# Patient Record
Sex: FEMALE | Race: WHITE | Employment: FULL TIME | URBAN - METROPOLITAN AREA
[De-identification: names, ages, dates, MRNs, and addresses within clinical notes are randomized per-mention and may not be internally consistent; named-entity substitution may affect disease eponyms.]

---

## 2021-03-30 ENCOUNTER — OFFICE VISIT (OUTPATIENT)
Dept: ORTHOPEDIC SURGERY | Age: 31
End: 2021-03-30
Payer: COMMERCIAL

## 2021-03-30 VITALS
TEMPERATURE: 98.3 F | BODY MASS INDEX: 26.68 KG/M2 | HEIGHT: 62 IN | WEIGHT: 145 LBS | SYSTOLIC BLOOD PRESSURE: 100 MMHG | DIASTOLIC BLOOD PRESSURE: 60 MMHG | HEART RATE: 59 BPM

## 2021-03-30 DIAGNOSIS — M25.561 ACUTE PAIN OF RIGHT KNEE: Primary | ICD-10-CM

## 2021-03-30 DIAGNOSIS — S86.911A KNEE STRAIN, RIGHT, INITIAL ENCOUNTER: ICD-10-CM

## 2021-03-30 PROCEDURE — L1812 KO ELASTIC W/JOINTS PRE OTS: HCPCS | Performed by: PHYSICIAN ASSISTANT

## 2021-03-30 PROCEDURE — 99203 OFFICE O/P NEW LOW 30 MIN: CPT | Performed by: PHYSICIAN ASSISTANT

## 2021-03-30 SDOH — ECONOMIC STABILITY: FOOD INSECURITY: WITHIN THE PAST 12 MONTHS, THE FOOD YOU BOUGHT JUST DIDN'T LAST AND YOU DIDN'T HAVE MONEY TO GET MORE.: NOT ASKED

## 2021-03-30 SDOH — ECONOMIC STABILITY: TRANSPORTATION INSECURITY
IN THE PAST 12 MONTHS, HAS THE LACK OF TRANSPORTATION KEPT YOU FROM MEDICAL APPOINTMENTS OR FROM GETTING MEDICATIONS?: NOT ASKED

## 2021-03-30 NOTE — PROGRESS NOTES
Subjective:      Patient ID: Lorean Ganser is a 27 y.o. female. Chief Complaint   Patient presents with    Knee Injury     Rt. Injury on 3/29/21        HPI:   She is here for an initial evaluation of a new problem. Right knee injury. She states last evening while performing her work-out, she was jumping down off of stacked weights from the pull-up bar. She landed awkwardly and partially rolling her ankle and sustained an injury to her right knee. She states it looked as if her patella may have subluxed or dislocated. She is having moderate right knee swelling and tenderness over the medial and lateral joint line. She is training for a CrossFit competition. 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 Pain Scale 4/10 VAS. Location of pain medial and lateral right knee. Pain is worse with activities. Pain improves with ice. Previous treatments have included Advil. Review Of Systems:   A 14 point review of systems and history form completed by the patient has been reviewed. This scanned in the media tab of the patient's chart under today's date. As outlined in the HPI. Negative for fever or chills. Negative for poly-joint pain, swelling and stiffness. Negative for numbness or tingling. Past Medical History:   Diagnosis Date    Uncomplicated asthma        History reviewed. No pertinent family history. History reviewed. No pertinent surgical history. Social History     Occupational History    Occupation: Nurse Practitioner at [address] Use    Smoking status: Never Smoker    Smokeless tobacco: Never Used   Substance and Sexual Activity    Alcohol use: Not on file    Drug use: Not on file    Sexual activity: Not on file       No current outpatient medications on file. No current facility-administered medications for this visit.           Objective:     She is alert, oriented x 3, pleasant, well nourished, developed and in abrasions. No significant erythema. No significant rashes or skin lesions. X Rays: performed in the office today:   AP Standing,Lateral and Sunrise of right Knee: Normal radiographic study. The alignment is anatomic. There are no radiographic findings to suggest fracture or dislocation. Additional Tests reviewed: None. Additional Outside Records reviewed: None. Diagnosis:       ICD-10-CM    1. Acute pain of right knee  M25.561 XR KNEE RIGHT (3 VIEWS)     Breg Economy Hinged Knee Brace   2. Knee strain, right, initial encounter  S86.701X         Assessment and Plan:       Assessment:  Acute right knee pain after a valgus type injury to the knee. She thought she may have had a patellar subluxation event. She has a negative apprehension test.  She is not tender over the medial or lateral retinaculum or facet of the patella's. She has no anterior cruciate ligament laxity, posterior cruciate laxity or collateral ligament laxity. She has no significant joint effusion. Kristine's test mildly positive on exam.  Questionable meniscus tear. Probable mild strain of the knee. Plan:  Medications-NSAIDs recommended. Ibuprofen 600 mg 3 times daily. PT-PT for gentle range of motion. Quad strengthening exercises. Further Imaging-discussed MRI of the knee. She would like to hold off at this point to see if symptoms improve over the next week. If symptoms do not improve or symptoms worsen she should notify the office to schedule MRI of the right knee. Procedures-recommend knee brace. Procedures    Breg Economy Hinged Knee Brace     Patient was prescribed a Breg Economy Hinged Knee Brace. The right knee will require stabilization / immobilization from this semi-rigid / rigid orthosis to improve their function. The orthosis will assist in protecting the affected area, provide functional support and facilitate healing.     The patient was educated and fit by a healthcare professional with expert knowledge and specialization in brace application while under the direct supervision of the treating physician. Verbal and written instructions for the use of and application of this item were provided. They were instructed to contact the office immediately should the brace result in increased pain, decreased sensation, increased swelling or worsening of the condition. Follow up- 1 week. Call or return to clinic if these symptoms worsen or fail to improve as anticipated.

## 2021-04-16 ENCOUNTER — TELEPHONE (OUTPATIENT)
Dept: ORTHOPEDIC SURGERY | Age: 31
End: 2021-04-16

## 2021-04-16 DIAGNOSIS — M25.561 ACUTE PAIN OF RIGHT KNEE: Primary | ICD-10-CM

## 2021-04-16 NOTE — TELEPHONE ENCOUNTER
OTHER:  PATIENT RE-INJURED HER RIGHT KNEE. REQUESTING TO HAVE A MRI DONE. REQ A CALL BACK TO DISCUSS.   PATIENTReinaldo December  PHONE: [phone]

## 2021-04-16 NOTE — TELEPHONE ENCOUNTER
Called and spoke with patient. She states knee was feeling better after visit and she went back to the gym and knee gave out once again and leg went completely to the side. Informed this would be addressed with Sara Sotomayor on Monday to see if he would like patient to come back in for evaluation or is okay with her going forward with getting the MRI.

## 2021-04-19 NOTE — TELEPHONE ENCOUNTER
Called and advised patient that Tracy Baker would like to move forward with the mri. [facility].

## 2021-04-30 ENCOUNTER — OFFICE VISIT (OUTPATIENT)
Dept: ORTHOPEDIC SURGERY | Age: 31
End: 2021-04-30
Payer: COMMERCIAL

## 2021-04-30 ENCOUNTER — TELEPHONE (OUTPATIENT)
Dept: ORTHOPEDIC SURGERY | Age: 31
End: 2021-04-30

## 2021-04-30 VITALS — TEMPERATURE: 97.6 F | BODY MASS INDEX: 25.69 KG/M2 | WEIGHT: 145 LBS | HEIGHT: 63 IN

## 2021-04-30 DIAGNOSIS — S86.911A KNEE STRAIN, RIGHT, INITIAL ENCOUNTER: Primary | ICD-10-CM

## 2021-04-30 PROCEDURE — 99204 OFFICE O/P NEW MOD 45 MIN: CPT | Performed by: ORTHOPAEDIC SURGERY

## 2021-04-30 NOTE — TELEPHONE ENCOUNTER
Appointment Request     Patient requesting earlier appointment: No  Appointment offered to patient: [address]   Patient Contact Number: [phone]    PATIENT NEEDS TO RESCHEDULE SO THAT HER FAMILY CAN COME FROM OUT OF TOWN

## 2021-04-30 NOTE — PROGRESS NOTES
Date:  2021    Name:  Cary Leggett  Address:  [address] [place] [id number]    :  1990      Age:   27 y.o.    SSN:        Medical Record Number:  <[id number]>    Reason for Visit:    Chief Complaint    New Patient (Right knee)      DOS:2021     HPI: Cary Leggett is a 27 y.o. female here today for evaluation of right knee pain that has been ongoing for a month. A month ago she injured her right knee performing a pull up and when dropping down from the bar she landed awkwardly on a pile of stacked weights. She states it looked as if her patella may have subluxed or dislocated. She had moderate right knee swelling with pain medially and laterally. She has been wearing a brace for support due to the feeling of instability. She did try to return to weightlifting a couple weeks after the initial injury but was very unstable and felt the knee buckle. 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 Pain Assessment  Location of Pain: Knee  Location Modifiers: Right  Severity of Pain: 3  Quality of Pain: Sharp, Dull, Aching  Duration of Pain: Persistent  Frequency of Pain: Intermittent  Aggravating Factors: Bending(twisting)  Limiting Behavior: Yes  Relieving Factors: Rest  Result of Injury: Yes  Work-Related Injury: No  Are there other pain locations you wish to document?: No  ROS: Review of systems reviewed from Patient History Form completed today and available in the patient's chart under the Media tab. Past Medical History:   Diagnosis Date    Uncomplicated asthma         History reviewed. No pertinent surgical history. History reviewed. No pertinent family history.     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[employer] resource strain: None    Food insecurity     Worry: None     Inability: None    Transportation needs     Medical: None     Non-medical: None   Tobacco Use    Smoking status: Never Smoker    Smokeless tobacco: Never Used   Substance and Sexual Activity    Alcohol use: None    Drug use: None    Sexual activity: None   Lifestyle    Physical activity     Days per week: None     Minutes per session: None    Stress: None   Relationships    Social connections     Talks on phone: None     Gets together: None     Attends [employer] service: None     Active member of club or organization: None     Attends meetings of clubs or organizations: None     Relationship status: None    Intimate partner violence     Fear of current or ex partner: None     Emotionally abused: None     Physically abused: None     Forced sexual activity: None   Other Topics Concern    None   Social History Narrative    None       No current outpatient medications on file. No current facility-administered medications for this visit. Allergies   Allergen Reactions    Amoxicillin      Hives    Hydrocodone      Facial rash       Vital signs:  Temp 97.6 °F (36.4 °C)   Ht 5' 3\" (1.6 m)   Wt 145 lb (65.8 kg)   BMI 25.69 kg/m²            RIGHT Knee Exam:     Gait: No use of assistive devices. No antalgic gait. Alignment: Normal alignment. Inspection/skin: Skin is intact without erythema or ecchymosis. No gross deformity. Palpation: No crepitus. Medial and lateral joint line tenderness present. Range of Motion: 0 - 100 degrees of flexion. Strength: Moderate quad atrophy. Effusion: No effusion or swelling present. Ligamentous stability: No cruciate or collateral ligament instability. Neurologic and vascular: Skin is warm and well-perfused. Sensation is intact to light-touch.      Special tests: 2+ Lachman      LEFT Knee Exam: Gait: No use of assistive devices. No antalgic gait. Alignment: Normal alignment. Inspection/skin: Skin is intact without erythema or ecchymosis. No gross deformity. Palpation: No crepitus. No joint line tenderness present. Range of Motion: There is full range of motion. Strength: Normal quadriceps development. Effusion: No effusion or swelling present. Ligamentous stability: No cruciate or collateral ligament instability. Neurologic and vascular: Skin is warm and well-perfused. Sensation is intact to light-touch. Special tests: Negative Kristine sign. Diagnostics:  Radiology:     Pertinent imaging was interpreted and reviewed with the patient, both images and report. Previous radiographs dated 3/30/2021 AP Standing,Lateral and Sunrise of right Knee: Normal radiographic study. The alignment is anatomic. There are no radiographic findings to suggest fracture or dislocation. MRI of the right knee dated 4/22/2021 was interpreted and reviewed with the patient today. CONCLUSION:   1. Pivot-shift mechanism of injury, with complete, full-thickness tear of the midbody ACL, and    impaction fractures of the anterolateral femoral condyle, posteromedial and posterolateral    tibial plateau, and peripheral medial femoral condyle.  No passive anterior tibial translation. 2. Grade 1-2 MCL sprain. 3. Ramp type 1 lesion of the medial meniscus, with swelling/sprain of the posteromedial capsule    and meniscocapsular junction, and thin, 1-1.5 cm peripheral horizontal tear of the medial    meniscus at the posterior body-horn junction. 4. Thin 1-1.5 cm Wrisberg rip tear of the lateral meniscus posterior root-horn junction.     Adjacent swelling/sprain of the popliteofibular and arcuate ligaments. Assessment:   1. ACL tear right knee  2. Medial and lateral meniscus tears of right knee      Plan: Pertinent imaging was reviewed.  The etiology, natural history, and treatment options for the disorder were discussed. The roles of activity medication, antiinflammatories, injections, bracing, physical therapy, and surgical interventions were all described to the patient and questions were answered. She is a very active person with an unstable knee and I do not feel conservative treatment will be sufficient for her level of activity. I believe she is a candidate for surgical intervention for a right knee ACL reconstruction with medial and lateral menisectomies versus repair. We discussed graft options and she would like to proceed with BTB autograft. Risks, benefits and potential complications of right knee surgery were discussed with the patient. Risks discussed include but are not limited to bleeding, infection, anesthetic risk, injury to nerves and blood vessels, deep vein thrombosis, residual stiffness and weakness, and the need for revision surgery. The patient also understands that anesthetic risks include cardiopulmonary issues, drug reactions and even death. The patient voices an understanding of the importance of physical therapy and home exercises after surgery. All questions were answered. No personal history of blood clots. No Family history of blood clots. No personal history of bleeding disorders. Personal history antibiotic allergies, Amoxicillin. No personal intolerance or allergies to NSAIDs. Personal intolerance or allergies to narcotics, hydrocodone. No personal diabetes. Plans to do postoperative physical therapy at [facility]. I will see her back postoperatively, sooner if needed. Arabella Bean is in agreement with this plan. All questions were answered to patient's satisfaction and was encouraged to call with any further questions. No orders of the defined types were placed in this encounter.         Total time spent for evaluation, education and development of treatment plan: 45 minutes      Wally CONTRERAS ATC, am scribing for and in the presence of Dr. Jay Jay Cardoza. 04/30/21 10:17 AM Aubree Chapman ATC        I attest that I met personally with the patient, performed the described exam, reviewed the radiographic studies and medical records associated with this patient and supervised the services that are described above.      Monika Chappell MD

## 2021-05-04 ENCOUNTER — TELEPHONE (OUTPATIENT)
Dept: ORTHOPEDIC SURGERY | Age: 31
End: 2021-05-04

## 2021-05-06 ENCOUNTER — OFFICE VISIT (OUTPATIENT)
Dept: PRIMARY CARE CLINIC | Age: 31
End: 2021-05-06
Payer: COMMERCIAL

## 2021-05-06 DIAGNOSIS — Z01.818 PRE-OP EXAMINATION: Primary | ICD-10-CM

## 2021-05-06 LAB — SARS-COV-2: NOT DETECTED

## 2021-05-06 PROCEDURE — 99421 OL DIG E/M SVC 5-10 MIN: CPT | Performed by: NURSE PRACTITIONER

## 2021-05-06 NOTE — PROGRESS NOTES
[phone]. 4. Please call [phone] option #2 option #2 if you have not been preregistered yet. On the day of your procedure bring your insurance card and photo ID. You will be registered at your bedside once brought back to your room. 5. DO NOT EAT ANYTHING eight hours prior to your arrival for surgery. May have 8 ounces of water 4 hours prior to your arrival for surgery. NOTE: ALL Gastric, Bariatric and Bowel surgery patients MUST follow their surgeon's instructions. 6. MEDICATIONS    Take the following medications with a SMALL sip of water: none   Use your usual dose of inhalers the morning of surgery. BRING your rescue inhaler with you to hospital.    Anesthesia does NOT want you to take insulin the morning of surgery. They will control your blood sugar while you are at the hospital. Please contact your ordering physician for instructions regarding your insulin the night before your procedure. If you have an insulin pump, please keep it set on basal rate. 7. Do not swallow water when brushing teeth. No gum, candy, mints or ice chips. Refrain from smoking or at least decrease the amount. 8. Dress in loose, comfortable clothing appropriate for redressing after your procedure. Do not wear jewelry (including body piercings), make-up (especially NO eye make-up), fingernail polish (NO toenail polish if foot/leg surgery), lotion, powders or metal hairclips. 9. Dentures, glasses, or contacts will need to be removed before your procedure. Bring cases for your glasses, contacts, dentures, or hearing aids to protect them while you are in surgery. 10. If you use a CPAP, please bring it with you on the day of your procedure. 11. We recommend that valuable personal  belongings such as cash, cell phones, e-tablets or jewelry, be left at home during your stay. The hospital will not be responsible for valuables that are not secured in the hospital safe.  However, if your insurance requires the first phase of your recovery. Your nurse will help you recover from any potential side effects of anesthesia, such as extreme drowsiness, changes in your vital signs or breathing patterns. Nausea, headache, muscle aches, or sore throat may also occur after anesthesia. Your nurse will help you manage these potential side effects. 2. For comfort and safety, arrange to have someone at home with you for the first 24 hours after discharge. 3. You and your family will be given written instructions about your diet, activity, dressing care, medications, and return visits. 4. Once at home, should issues with nausea, pain, or bleeding occur, or should you notice any signs of infection, you should call your surgeon. 5. Always clean your hands before and after caring for your wound. Do not let your family touch your surgery site without cleaning their hands. 6. Narcotic pain medications can cause significant constipation. You may want to add a stool softener to your postoperative medication schedule or speak to your surgeon on how best to manage this SIDE EFFECT. SPECIAL INSTRUCTIONS  If menstruating DOS, no tampons patient acknowledges understanding of this along with  pre op instructions. Thank you for allowing us to care for you. We strive to exceed your expectations in the delivery of care and service provided to you and your family. If you need to contact the [place] [address] staff for any reason, please call us at [phone]    Instructions reviewed with patient during preadmission testing phone interview. Mary Sheth. 5/6/2021 .9:32 AM      ADDITIONAL EDUCATIONAL INFORMATION REVIEWED PER PHONE WITH YOU AND/OR YOUR FAMILY:  Yes Hibiclens® Bathing Instructions   No Antibacterial Soap

## 2021-05-06 NOTE — TELEPHONE ENCOUNTER
Auth: # [id number]    Date: 5/06/2021 thru 5/11/2021  Type of SX:  Outpatient  Location: [facility]  CPT: [id number], [id number]   SX area: Rt knee  Insurance: [employer]

## 2021-05-10 ENCOUNTER — ANESTHESIA EVENT (OUTPATIENT)
Dept: OPERATING ROOM | Age: 31
End: 2021-05-10
Payer: COMMERCIAL

## 2021-05-10 NOTE — PROGRESS NOTES
[facility] / [address] [facility], [address]    Acknowledgment of Informed Consent for Surgical or Medical Procedure and Sedation  I agree to allow doctor(s) Zachary Damon  and his/her associates or assistants, including residents and/or other qualified medical practitioner to perform the following medical treatment or procedure and to administer or direct the administration of sedation as necessary:  Procedure(s): RIGHT KNEE ARTHROSOCPY, ANTERIOR CRUCIATE LIGAMENT RECONSTRUCTION PATELLA TENDON, MEDIAL AND [address] [place]       My doctor has explained the following regarding the proposed procedure:   the explanation of the procedure   the benefits of the procedure   the potential problems that might occur during recuperation   the risks and side effects of the procedure which could include but are not limited to severe blood loss, infection, stroke or death   the benefits, risks and side effect of alternative procedures including the consequences of declining this procedure or any alternative procedures   the likelihood of achieving satisfactory results. I acknowledge no guarantee or assurance has been made to me regarding the results. I understand that during the course of this treatment/procedure, unforeseen conditions can occur which require an additional or different procedure. I agree to allow my physician or assistants to perform such extension of the original procedure as they may find necessary. I understand that sedation will often result in temporary impairment of memory and fine motor skills and that sedation can occasionally progress to a state of deep sedation or general anesthesia. I understand the risks of anesthesia for surgery include, but are not limited to, sore throat, hoarseness, injury to face, mouth, or teeth; nausea; headache; injury to blood vessels or nerves; death, brain damage, or paralysis.     I understand

## 2021-05-11 ENCOUNTER — ANESTHESIA (OUTPATIENT)
Dept: OPERATING ROOM | Age: 31
End: 2021-05-11
Payer: COMMERCIAL

## 2021-05-11 ENCOUNTER — HOSPITAL ENCOUNTER (OUTPATIENT)
Age: 31
Setting detail: OUTPATIENT SURGERY
Discharge: HOME | End: 2021-05-11
Attending: ORTHOPAEDIC SURGERY | Admitting: ORTHOPAEDIC SURGERY
Payer: COMMERCIAL

## 2021-05-11 VITALS
BODY MASS INDEX: 26.58 KG/M2 | HEART RATE: 62 BPM | OXYGEN SATURATION: 100 % | TEMPERATURE: 97.1 F | RESPIRATION RATE: 16 BRPM | HEIGHT: 63 IN | DIASTOLIC BLOOD PRESSURE: 68 MMHG | SYSTOLIC BLOOD PRESSURE: 121 MMHG | WEIGHT: 150 LBS

## 2021-05-11 VITALS — DIASTOLIC BLOOD PRESSURE: 55 MMHG | SYSTOLIC BLOOD PRESSURE: 98 MMHG | OXYGEN SATURATION: 100 % | TEMPERATURE: 97 F

## 2021-05-11 DIAGNOSIS — Z98.890 S/P ACL RECONSTRUCTION: Primary | ICD-10-CM

## 2021-05-11 LAB — PREGNANCY, URINE: NEGATIVE

## 2021-05-11 PROCEDURE — 84703 CHORIONIC GONADOTROPIN ASSAY: CPT

## 2021-05-11 PROCEDURE — 6360000002 HC RX W HCPCS: Performed by: ORTHOPAEDIC SURGERY

## 2021-05-11 PROCEDURE — 2580000003 HC RX 258: Performed by: NURSE ANESTHETIST, CERTIFIED REGISTERED

## 2021-05-11 PROCEDURE — 6370000000 HC RX 637 (ALT 250 FOR IP): Performed by: ANESTHESIOLOGY

## 2021-05-11 PROCEDURE — 2580000003 HC RX 258: Performed by: ORTHOPAEDIC SURGERY

## 2021-05-11 PROCEDURE — 64447 NJX AA&/STRD FEMORAL NRV IMG: CPT | Performed by: ANESTHESIOLOGY

## 2021-05-11 PROCEDURE — 2580000003 HC RX 258: Performed by: ANESTHESIOLOGY

## 2021-05-11 PROCEDURE — 7100000011 HC PHASE II RECOVERY - ADDTL 15 MIN: Performed by: ORTHOPAEDIC SURGERY

## 2021-05-11 PROCEDURE — 6360000002 HC RX W HCPCS: Performed by: ANESTHESIOLOGY

## 2021-05-11 PROCEDURE — 7100000001 HC PACU RECOVERY - ADDTL 15 MIN: Performed by: ORTHOPAEDIC SURGERY

## 2021-05-11 PROCEDURE — C1713 ANCHOR/SCREW BN/BN,TIS/BN: HCPCS | Performed by: ORTHOPAEDIC SURGERY

## 2021-05-11 PROCEDURE — 3600000004 HC SURGERY LEVEL 4 BASE: Performed by: ORTHOPAEDIC SURGERY

## 2021-05-11 PROCEDURE — 2500000003 HC RX 250 WO HCPCS: Performed by: NURSE ANESTHETIST, CERTIFIED REGISTERED

## 2021-05-11 PROCEDURE — 7100000000 HC PACU RECOVERY - FIRST 15 MIN: Performed by: ORTHOPAEDIC SURGERY

## 2021-05-11 PROCEDURE — 2500000003 HC RX 250 WO HCPCS: Performed by: ORTHOPAEDIC SURGERY

## 2021-05-11 PROCEDURE — 3700000000 HC ANESTHESIA ATTENDED CARE: Performed by: ORTHOPAEDIC SURGERY

## 2021-05-11 PROCEDURE — 2709999900 HC NON-CHARGEABLE SUPPLY: Performed by: ORTHOPAEDIC SURGERY

## 2021-05-11 PROCEDURE — 6360000002 HC RX W HCPCS: Performed by: NURSE ANESTHETIST, CERTIFIED REGISTERED

## 2021-05-11 PROCEDURE — 3600000014 HC SURGERY LEVEL 4 ADDTL 15MIN: Performed by: ORTHOPAEDIC SURGERY

## 2021-05-11 PROCEDURE — 2720000010 HC SURG SUPPLY STERILE: Performed by: ORTHOPAEDIC SURGERY

## 2021-05-11 PROCEDURE — 6360000002 HC RX W HCPCS

## 2021-05-11 PROCEDURE — 3700000001 HC ADD 15 MINUTES (ANESTHESIA): Performed by: ORTHOPAEDIC SURGERY

## 2021-05-11 PROCEDURE — 7100000010 HC PHASE II RECOVERY - FIRST 15 MIN: Performed by: ORTHOPAEDIC SURGERY

## 2021-05-11 DEVICE — UNIVERSAL WEDGE 8MM X 20MM (1.5MM BORE): Type: IMPLANTABLE DEVICE | Site: KNEE | Status: FUNCTIONAL

## 2021-05-11 DEVICE — SCREW INTRF CANN 7X20 MM WDG SHP ROUNDED THRD FOR ACL RECON: Type: IMPLANTABLE DEVICE | Site: KNEE | Status: FUNCTIONAL

## 2021-05-11 DEVICE — ANCHOR SUTURE CRV POLYESTER 2 FIBERWIRE FIBERSTITCH: Type: IMPLANTABLE DEVICE | Site: KNEE | Status: FUNCTIONAL

## 2021-05-11 RX ORDER — LIDOCAINE HCL/PF 100 MG/5ML
SYRINGE (ML) INJECTION PRN
Status: DISCONTINUED | OUTPATIENT
Start: 2021-05-11 | End: 2021-05-11 | Stop reason: SDUPTHER

## 2021-05-11 RX ORDER — SODIUM CHLORIDE 0.9 % (FLUSH) 0.9 %
10 SYRINGE (ML) INJECTION EVERY 12 HOURS SCHEDULED
Status: DISCONTINUED | OUTPATIENT
Start: 2021-05-11 | End: 2021-05-11 | Stop reason: HOSPADM

## 2021-05-11 RX ORDER — SODIUM CHLORIDE 9 MG/ML
25 INJECTION, SOLUTION INTRAVENOUS PRN
Status: DISCONTINUED | OUTPATIENT
Start: 2021-05-11 | End: 2021-05-11 | Stop reason: HOSPADM

## 2021-05-11 RX ORDER — HYDRALAZINE HYDROCHLORIDE 20 MG/ML
5 INJECTION INTRAMUSCULAR; INTRAVENOUS EVERY 10 MIN PRN
Status: DISCONTINUED | OUTPATIENT
Start: 2021-05-11 | End: 2021-05-11 | Stop reason: HOSPADM

## 2021-05-11 RX ORDER — ROCURONIUM BROMIDE 10 MG/ML
INJECTION, SOLUTION INTRAVENOUS PRN
Status: DISCONTINUED | OUTPATIENT
Start: 2021-05-11 | End: 2021-05-11 | Stop reason: SDUPTHER

## 2021-05-11 RX ORDER — ROPIVACAINE HYDROCHLORIDE 5 MG/ML
30 INJECTION, SOLUTION EPIDURAL; INFILTRATION; PERINEURAL ONCE
Status: DISCONTINUED | OUTPATIENT
Start: 2021-05-11 | End: 2021-05-11 | Stop reason: HOSPADM

## 2021-05-11 RX ORDER — PROCHLORPERAZINE EDISYLATE 5 MG/ML
5 INJECTION INTRAMUSCULAR; INTRAVENOUS
Status: COMPLETED | OUTPATIENT
Start: 2021-05-11 | End: 2021-05-11

## 2021-05-11 RX ORDER — PROPOFOL 10 MG/ML
INJECTION, EMULSION INTRAVENOUS PRN
Status: DISCONTINUED | OUTPATIENT
Start: 2021-05-11 | End: 2021-05-11 | Stop reason: SDUPTHER

## 2021-05-11 RX ORDER — SODIUM CHLORIDE, SODIUM LACTATE, POTASSIUM CHLORIDE, AND CALCIUM CHLORIDE .6; .31; .03; .02 G/100ML; G/100ML; G/100ML; G/100ML
IRRIGANT IRRIGATION PRN
Status: DISCONTINUED | OUTPATIENT
Start: 2021-05-11 | End: 2021-05-11 | Stop reason: ALTCHOICE

## 2021-05-11 RX ORDER — FENTANYL CITRATE 50 UG/ML
50 INJECTION, SOLUTION INTRAMUSCULAR; INTRAVENOUS EVERY 5 MIN PRN
Status: DISCONTINUED | OUTPATIENT
Start: 2021-05-11 | End: 2021-05-11 | Stop reason: HOSPADM

## 2021-05-11 RX ORDER — SODIUM CHLORIDE, SODIUM LACTATE, POTASSIUM CHLORIDE, CALCIUM CHLORIDE 600; 310; 30; 20 MG/100ML; MG/100ML; MG/100ML; MG/100ML
INJECTION, SOLUTION INTRAVENOUS CONTINUOUS PRN
Status: DISCONTINUED | OUTPATIENT
Start: 2021-05-11 | End: 2021-05-11 | Stop reason: SDUPTHER

## 2021-05-11 RX ORDER — ONDANSETRON 4 MG/1
4 TABLET, FILM COATED ORAL EVERY 8 HOURS PRN
Qty: 30 TABLET | Refills: 0 | Status: SHIPPED | OUTPATIENT
Start: 2021-05-11

## 2021-05-11 RX ORDER — CEFAZOLIN SODIUM 2 G/50ML
2000 SOLUTION INTRAVENOUS ONCE
Status: COMPLETED | OUTPATIENT
Start: 2021-05-11 | End: 2021-05-11

## 2021-05-11 RX ORDER — DEXAMETHASONE SODIUM PHOSPHATE 4 MG/ML
INJECTION, SOLUTION INTRA-ARTICULAR; INTRALESIONAL; INTRAMUSCULAR; INTRAVENOUS; SOFT TISSUE PRN
Status: DISCONTINUED | OUTPATIENT
Start: 2021-05-11 | End: 2021-05-11 | Stop reason: SDUPTHER

## 2021-05-11 RX ORDER — OXYCODONE HYDROCHLORIDE AND ACETAMINOPHEN 5; 325 MG/1; MG/1
1-2 TABLET ORAL EVERY 4 HOURS PRN
Qty: 42 TABLET | Refills: 0 | Status: SHIPPED | OUTPATIENT
Start: 2021-05-11 | End: 2021-05-18

## 2021-05-11 RX ORDER — AMOXICILLIN 250 MG
2 CAPSULE ORAL DAILY PRN
Qty: 30 TABLET | Refills: 0 | Status: SHIPPED | OUTPATIENT
Start: 2021-05-11

## 2021-05-11 RX ORDER — PROCHLORPERAZINE EDISYLATE 5 MG/ML
INJECTION INTRAMUSCULAR; INTRAVENOUS
Status: COMPLETED
Start: 2021-05-11 | End: 2021-05-11

## 2021-05-11 RX ORDER — SODIUM CHLORIDE, SODIUM LACTATE, POTASSIUM CHLORIDE, CALCIUM CHLORIDE 600; 310; 30; 20 MG/100ML; MG/100ML; MG/100ML; MG/100ML
INJECTION, SOLUTION INTRAVENOUS CONTINUOUS
Status: DISCONTINUED | OUTPATIENT
Start: 2021-05-11 | End: 2021-05-11 | Stop reason: HOSPADM

## 2021-05-11 RX ORDER — LABETALOL HYDROCHLORIDE 5 MG/ML
5 INJECTION, SOLUTION INTRAVENOUS EVERY 10 MIN PRN
Status: DISCONTINUED | OUTPATIENT
Start: 2021-05-11 | End: 2021-05-11 | Stop reason: HOSPADM

## 2021-05-11 RX ORDER — FENTANYL CITRATE 50 UG/ML
25 INJECTION, SOLUTION INTRAMUSCULAR; INTRAVENOUS EVERY 5 MIN PRN
Status: DISCONTINUED | OUTPATIENT
Start: 2021-05-11 | End: 2021-05-11 | Stop reason: HOSPADM

## 2021-05-11 RX ORDER — SODIUM CHLORIDE 0.9 % (FLUSH) 0.9 %
10 SYRINGE (ML) INJECTION PRN
Status: DISCONTINUED | OUTPATIENT
Start: 2021-05-11 | End: 2021-05-11 | Stop reason: HOSPADM

## 2021-05-11 RX ORDER — ONDANSETRON 2 MG/ML
4 INJECTION INTRAMUSCULAR; INTRAVENOUS
Status: DISCONTINUED | OUTPATIENT
Start: 2021-05-11 | End: 2021-05-11 | Stop reason: HOSPADM

## 2021-05-11 RX ORDER — FENTANYL CITRATE 50 UG/ML
100 INJECTION, SOLUTION INTRAMUSCULAR; INTRAVENOUS ONCE
Status: COMPLETED | OUTPATIENT
Start: 2021-05-11 | End: 2021-05-11

## 2021-05-11 RX ORDER — ONDANSETRON 2 MG/ML
INJECTION INTRAMUSCULAR; INTRAVENOUS PRN
Status: DISCONTINUED | OUTPATIENT
Start: 2021-05-11 | End: 2021-05-11 | Stop reason: SDUPTHER

## 2021-05-11 RX ORDER — ASPIRIN 325 MG
325 TABLET, DELAYED RELEASE (ENTERIC COATED) ORAL DAILY
Qty: 21 TABLET | Refills: 0 | Status: SHIPPED | OUTPATIENT
Start: 2021-05-11 | End: 2021-06-01

## 2021-05-11 RX ORDER — ROPIVACAINE HYDROCHLORIDE 5 MG/ML
INJECTION, SOLUTION EPIDURAL; INFILTRATION; PERINEURAL
Status: COMPLETED | OUTPATIENT
Start: 2021-05-11 | End: 2021-05-11

## 2021-05-11 RX ORDER — DIPHENHYDRAMINE HYDROCHLORIDE 50 MG/ML
12.5 INJECTION INTRAMUSCULAR; INTRAVENOUS
Status: DISCONTINUED | OUTPATIENT
Start: 2021-05-11 | End: 2021-05-11 | Stop reason: HOSPADM

## 2021-05-11 RX ORDER — MIDAZOLAM HYDROCHLORIDE 1 MG/ML
2 INJECTION INTRAMUSCULAR; INTRAVENOUS ONCE
Status: COMPLETED | OUTPATIENT
Start: 2021-05-11 | End: 2021-05-11

## 2021-05-11 RX ORDER — MEPERIDINE HYDROCHLORIDE 25 MG/ML
12.5 INJECTION INTRAMUSCULAR; INTRAVENOUS; SUBCUTANEOUS EVERY 5 MIN PRN
Status: DISCONTINUED | OUTPATIENT
Start: 2021-05-11 | End: 2021-05-11 | Stop reason: HOSPADM

## 2021-05-11 RX ORDER — OXYCODONE HYDROCHLORIDE AND ACETAMINOPHEN 5; 325 MG/1; MG/1
1 TABLET ORAL
Status: COMPLETED | OUTPATIENT
Start: 2021-05-11 | End: 2021-05-11

## 2021-05-11 RX ADMIN — MIDAZOLAM HYDROCHLORIDE 2 MG: 2 INJECTION, SOLUTION INTRAMUSCULAR; INTRAVENOUS at 10:37

## 2021-05-11 RX ADMIN — ONDANSETRON 4 MG: 2 INJECTION INTRAMUSCULAR; INTRAVENOUS at 13:06

## 2021-05-11 RX ADMIN — ROCURONIUM BROMIDE 50 MG: 10 INJECTION INTRAVENOUS at 10:41

## 2021-05-11 RX ADMIN — OXYCODONE HYDROCHLORIDE AND ACETAMINOPHEN 1 TABLET: 5; 325 TABLET ORAL at 15:42

## 2021-05-11 RX ADMIN — ROPIVACAINE HYDROCHLORIDE 25 ML: 5 INJECTION, SOLUTION EPIDURAL; INFILTRATION; PERINEURAL at 09:43

## 2021-05-11 RX ADMIN — PROCHLORPERAZINE EDISYLATE 5 MG: 5 INJECTION INTRAMUSCULAR; INTRAVENOUS at 14:48

## 2021-05-11 RX ADMIN — MIDAZOLAM HYDROCHLORIDE 2 MG: 2 INJECTION, SOLUTION INTRAMUSCULAR; INTRAVENOUS at 09:31

## 2021-05-11 RX ADMIN — FENTANYL CITRATE 100 MCG: 50 INJECTION INTRAMUSCULAR; INTRAVENOUS at 09:32

## 2021-05-11 RX ADMIN — FENTANYL CITRATE 100 MCG: 50 INJECTION INTRAMUSCULAR; INTRAVENOUS at 13:13

## 2021-05-11 RX ADMIN — TRANEXAMIC ACID 1000 MG: 1 INJECTION, SOLUTION INTRAVENOUS at 13:04

## 2021-05-11 RX ADMIN — ROPIVACAINE HYDROCHLORIDE 15 ML: 5 INJECTION, SOLUTION EPIDURAL; INFILTRATION; PERINEURAL at 15:05

## 2021-05-11 RX ADMIN — SODIUM CHLORIDE, SODIUM LACTATE, POTASSIUM CHLORIDE, AND CALCIUM CHLORIDE: .6; .31; .03; .02 INJECTION, SOLUTION INTRAVENOUS at 12:01

## 2021-05-11 RX ADMIN — PROPOFOL 150 MG: 10 INJECTION, EMULSION INTRAVENOUS at 10:39

## 2021-05-11 RX ADMIN — FENTANYL CITRATE 50 MCG: 50 INJECTION, SOLUTION INTRAMUSCULAR; INTRAVENOUS at 14:33

## 2021-05-11 RX ADMIN — SODIUM CHLORIDE, SODIUM LACTATE, POTASSIUM CHLORIDE, AND CALCIUM CHLORIDE: .6; .31; .03; .02 INJECTION, SOLUTION INTRAVENOUS at 10:00

## 2021-05-11 RX ADMIN — Medication 100 MG: at 10:39

## 2021-05-11 RX ADMIN — FENTANYL CITRATE 50 MCG: 50 INJECTION, SOLUTION INTRAMUSCULAR; INTRAVENOUS at 14:13

## 2021-05-11 RX ADMIN — CEFAZOLIN SODIUM 2000 MG: 2 SOLUTION INTRAVENOUS at 10:38

## 2021-05-11 RX ADMIN — SODIUM CHLORIDE, POTASSIUM CHLORIDE, SODIUM LACTATE AND CALCIUM CHLORIDE: 600; 310; 30; 20 INJECTION, SOLUTION INTRAVENOUS at 09:30

## 2021-05-11 RX ADMIN — DEXAMETHASONE SODIUM PHOSPHATE 8 MG: 4 INJECTION, SOLUTION INTRAMUSCULAR; INTRAVENOUS at 11:00

## 2021-05-11 RX ADMIN — FENTANYL CITRATE 50 MCG: 50 INJECTION, SOLUTION INTRAMUSCULAR; INTRAVENOUS at 14:03

## 2021-05-11 ASSESSMENT — PULMONARY FUNCTION TESTS
PIF_VALUE: 21
PIF_VALUE: 15
PIF_VALUE: 9
PIF_VALUE: 21
PIF_VALUE: 15
PIF_VALUE: 15
PIF_VALUE: 21
PIF_VALUE: 15
PIF_VALUE: 20
PIF_VALUE: 21
PIF_VALUE: 15
PIF_VALUE: 19
PIF_VALUE: 19
PIF_VALUE: 21
PIF_VALUE: 18
PIF_VALUE: 21
PIF_VALUE: 15
PIF_VALUE: 15
PIF_VALUE: 19
PIF_VALUE: 21
PIF_VALUE: 20
PIF_VALUE: 15
PIF_VALUE: 20
PIF_VALUE: 17
PIF_VALUE: 19
PIF_VALUE: 16
PIF_VALUE: 19
PIF_VALUE: 16
PIF_VALUE: 15
PIF_VALUE: 24
PIF_VALUE: 1
PIF_VALUE: 20
PIF_VALUE: 21
PIF_VALUE: 21
PIF_VALUE: 19
PIF_VALUE: 15
PIF_VALUE: 16
PIF_VALUE: 19
PIF_VALUE: 14
PIF_VALUE: 20
PIF_VALUE: 21
PIF_VALUE: 19
PIF_VALUE: 20
PIF_VALUE: 20
PIF_VALUE: 15
PIF_VALUE: 15
PIF_VALUE: 19
PIF_VALUE: 21
PIF_VALUE: 9
PIF_VALUE: 20
PIF_VALUE: 21
PIF_VALUE: 17
PIF_VALUE: 15
PIF_VALUE: 20
PIF_VALUE: 19
PIF_VALUE: 15
PIF_VALUE: 16
PIF_VALUE: 21
PIF_VALUE: 19
PIF_VALUE: 18
PIF_VALUE: 21
PIF_VALUE: 9
PIF_VALUE: 20
PIF_VALUE: 19
PIF_VALUE: 20
PIF_VALUE: 20
PIF_VALUE: 9
PIF_VALUE: 15
PIF_VALUE: 19
PIF_VALUE: 21
PIF_VALUE: 21
PIF_VALUE: 20
PIF_VALUE: 13
PIF_VALUE: 1
PIF_VALUE: 20
PIF_VALUE: 21
PIF_VALUE: 17
PIF_VALUE: 21
PIF_VALUE: 29
PIF_VALUE: 21
PIF_VALUE: 19
PIF_VALUE: 19
PIF_VALUE: 21
PIF_VALUE: 23
PIF_VALUE: 21
PIF_VALUE: 20
PIF_VALUE: 20
PIF_VALUE: 18
PIF_VALUE: 16
PIF_VALUE: 19

## 2021-05-11 ASSESSMENT — PAIN DESCRIPTION - DESCRIPTORS: DESCRIPTORS: ACHING;BURNING

## 2021-05-11 ASSESSMENT — PAIN SCALES - GENERAL
PAINLEVEL_OUTOF10: 3
PAINLEVEL_OUTOF10: 7

## 2021-05-11 ASSESSMENT — PAIN - FUNCTIONAL ASSESSMENT
PAIN_FUNCTIONAL_ASSESSMENT: PREVENTS OR INTERFERES SOME ACTIVE ACTIVITIES AND ADLS
PAIN_FUNCTIONAL_ASSESSMENT: 0-10

## 2021-05-11 ASSESSMENT — PAIN DESCRIPTION - ONSET
ONSET: AWAKENED FROM SLEEP
ONSET: ON-GOING

## 2021-05-11 ASSESSMENT — PAIN DESCRIPTION - ORIENTATION
ORIENTATION: RIGHT

## 2021-05-11 ASSESSMENT — PAIN DESCRIPTION - LOCATION
LOCATION: KNEE

## 2021-05-11 ASSESSMENT — PAIN DESCRIPTION - PAIN TYPE
TYPE: ACUTE PAIN
TYPE: SURGICAL PAIN;ACUTE PAIN

## 2021-05-11 ASSESSMENT — ENCOUNTER SYMPTOMS: SHORTNESS OF BREATH: 0

## 2021-05-11 ASSESSMENT — PAIN DESCRIPTION - PROGRESSION: CLINICAL_PROGRESSION: GRADUALLY WORSENING

## 2021-05-11 NOTE — H&P
Precious Young    [id number]    [facility] Same Day Surgery Update H & P  Department of General Surgery   Surgical Service   Pre-operative History and Physical  Last H & P within the last 30 days. DIAGNOSIS:   Rupture of anterior cruciate ligament of right knee, initial encounter [S83.001F]    Procedure(s):  RIGHT KNEE ARTHROSOCPY, ANTERIOR CRUCIATE LIGAMENT RECONSTRUCTION PATELLA TENDON, MEDIAL AND LATERAL MENISCUS EXCISION VERSUS REPAIR     HISTORY OF PRESENT ILLNESS:   Patient had a right knee injury about one month ago and re injured it recently. The symptoms have been recalcitrant to conservative treatment and the patient presents today for the above procedure. Covid 19:  Patient denies fever, chills, cough or known exposure to Covid-19. 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[employer] strain: None    Food insecurity     Worry: None     Inability: None    Transportation needs     Medical: None     Non-medical: None   Tobacco Use    Smoking status: Never Smoker    Smokeless tobacco: Never Used   Substance and Sexual Activity    Alcohol use:  Yes     Alcohol/week: 1.0 standard drinks     Types: 1 Glasses of wine per week     Comment: 2-3 a week     Drug use: Not Currently    Sexual activity: None   Lifestyle    Physical activity     Days per week: None     Minutes per session: None    Stress: None   Relationships    Social connections     Talks on phone: None     Gets together: None     Attends Worship service: None     Active member of club or organization: None     Attends meetings of clubs or organizations: None     Relationship status: None    Intimate partner violence     Fear of current or ex partner: None     Emotionally abused: None     Physically abused: None     Forced sexual activity: None   Other Topics Concern    None   Social History Narrative    None         Medications Prior to Admission:      No outpatient medications taken      Allergies:  Amoxicillin and Hydrocodone    PHYSICAL EXAM:      /78   Pulse 66   Temp 98.1 °F (36.7 °C) (Oral)   Resp 16   Ht 5' 3\" (1.6 m)   Wt 150 lb (68 kg)   LMP 04/08/2021 (Approximate)   SpO2 100%   BMI 26.57 kg/m²      Airway:  Airway patent with no audible stridor    Heart:  Regular rate and rhythm, No murmur noted    Lungs:  No increased work of breathing, good air exchange, clear to auscultation bilaterally, no crackles or wheezing    Abdomen:  Soft, non-distended, non-tender, normal active bowel sounds, no masses palpated    ASSESSMENT AND PLAN    Patient is a 27 y.o. female with above specified procedure planned. 1.  The patients history and physical was obtained and signed off by the pre-admission testing department. Patient seen and focused exam done today- no new changes since last physical exam on 5-3-2021    2. Access to ancillary services are available per request of the provider.     Joana Restrepo     5/11/2021

## 2021-05-11 NOTE — BRIEF OP NOTE
Brief Postoperative Note      Patient: Reynaldo Hamilton  YOB: 1990  MRN: [id number]    Date of Procedure: 5/11/2021    Pre-Op Diagnosis: Anterior Cruciate Ligament Tear, medial and lateral meniscal tears - right knee    Post-Op Diagnosis: Same       Procedure(s):  RIGHT KNEE ARTHROSOCPY, ANTERIOR CRUCIATE LIGAMENT RECONSTRUCTION PATELLA TENDON, MEDIAL AND LATERAL MENISCUS REPAIR (BTB autograft)    Surgeon(s):   MD Skyler Francisco MD    Assistant:  Surgical Assistant: Emmy Nguyen    Anesthesia: General    Estimated Blood Loss (mL): less than 50     Complications: None    Specimens:   * No specimens in log *    Implants:  * No implants in log *      Drains: * No LDAs found *    Findings: refer to formal operative report    Electronically signed by Skyler Muñoz MD on 5/11/2021 at 1:17 PM

## 2021-05-11 NOTE — PROGRESS NOTES
1369 Pt arrived calm CO pain RLE report received from crna of preop block no adverse hemodynamic events

## 2021-05-11 NOTE — ANESTHESIA PRE PROCEDURE
Department of Anesthesiology  Preprocedure Note       Name:  Blane Fountain   Age:  27 y.o.  :  1990                                          MRN:  [id number]         Date:  2021      Surgeon: Ryan Chavez): Hanford Cranker, MD    Procedure: Procedure(s):  RIGHT KNEE ARTHROSOCPY, ANTERIOR CRUCIATE LIGAMENT RECONSTRUCTION PATELLA TENDON, MEDIAL AND LATERAL MENISCUS EXCISION VERSUS REPAIR    Medications prior to admission:   Prior to Admission medications    Medication Sig Start Date End Date Taking? Authorizing Provider   oxyCODONE-acetaminophen (PERCOCET) 5-325 MG per tablet Take 1-2 tablets by mouth every 4 hours as needed for Pain for up to 7 days. Intended supply: 7 days. Take lowest dose possible to manage pain.   Do not take with tylenol 21 Yes Usha Rubio MD   ondansetron (ZOFRAN) 4 MG tablet Take 1 tablet by mouth every 8 hours as needed for Nausea or Vomiting 21  Yes Usha Rubio MD   senna-docusate (PERICOLACE) 8.6-50 MG per tablet Take 2 tablets by mouth daily as needed for Constipation 21  Yes Usha Rubio MD   aspirin 325 MG EC tablet Take 1 tablet by mouth daily for 21 days 21 Yes Usha Rubio MD       Current medications:    Current Facility-Administered Medications   Medication Dose Route Frequency Provider Last Rate Last Admin    ceFAZolin (ANCEF) 2000 mg in dextrose 3 % 50 mL IVPB (duplex)  2,000 mg Intravenous Once Hanford Cranker, MD        tranexamic acid (CYKLOKAPRON) 1,000 mg in sodium chloride 0.9 % 50 mL IVPB  1,000 mg Intravenous Once Hanford Cranker, MD        lactated ringers infusion   Intravenous Continuous Rdo Halim, DO        sodium chloride flush 0.9 % injection 10 mL  10 mL Intravenous 2 times per day Rod Halim, DO        sodium chloride flush 0.9 % injection 10 mL  10 mL Intravenous PRN Rod Halim, DO        0.9 % sodium chloride infusion  25 mL Intravenous PRN Rod Halim, DO  midazolam (VERSED) injection 2 mg  2 mg Intravenous Once Delano Eric MD        fentaNYL (SUBLIMAZE) injection 100 mcg  100 mcg Intravenous Once Delano Eric MD        ropivacaine (NAROPIN) 0.5% injection 30 mL  30 mL Infiltration Once Delano Eric MD           Allergies: Allergies   Allergen Reactions    Amoxicillin      Hives    Hydrocodone      Facial rash       Problem List:    Patient Active Problem List   Diagnosis Code    Acute pain of right knee M25.561    Knee strain, right, initial encounter A14.412L       Past Medical History:        Diagnosis Date    Uncomplicated asthma        Past Surgical History:        Procedure Laterality Date    WRIST SURGERY Right     orf right wrist        Social History:    Social History     Tobacco Use    Smoking status: Never Smoker    Smokeless tobacco: Never Used   Substance Use Topics    Alcohol use: Yes     Alcohol/week: 1.0 standard drinks     Types: 1 Glasses of wine per week     Comment: 2-3 a week                                 Counseling given: Not Answered      Vital Signs (Current):   Vitals:    05/06/21 0914 05/11/21 0842   BP:  126/78   Pulse:  66   Resp:  16   Temp:  98.1 °F (36.7 °C)   TempSrc:  Oral   SpO2:  100%   Weight: 150 lb (68 kg) 150 lb (68 kg)   Height: 5' 3\" (1.6 m) 5' 3\" (1.6 m)                                              BP Readings from Last 3 Encounters:   05/11/21 126/78   03/30/21 100/60       NPO Status: Time of last liquid consumption: 2345                        Time of last solid consumption: 2100                        Date of last liquid consumption: 05/10/21                        Date of last solid food consumption: 05/10/21    BMI:   Wt Readings from Last 3 Encounters:   05/11/21 150 lb (68 kg)   04/30/21 145 lb (65.8 kg)   03/30/21 145 lb (65.8 kg)     Body mass index is 26.57 kg/m².     CBC: No results found for: WBC, RBC, HGB, HCT, MCV, RDW, PLT    CMP: No results found for: NA, K, CL, CO2, BUN, CREATININE, GFRAA, AGRATIO, LABGLOM, GLUCOSE, PROT, CALCIUM, BILITOT, ALKPHOS, AST, ALT    POC Tests: No results for input(s): POCGLU, POCNA, POCK, POCCL, POCBUN, POCHEMO, POCHCT in the last 72 hours. Coags: No results found for: PROTIME, INR, APTT    HCG (If Applicable):   Lab Results   Component Value Date    PREGTESTUR Negative 05/11/2021        ABGs: No results found for: PHART, PO2ART, DGL6YGB, JII5OOY, BEART, D5OMIMZB     Type & Screen (If Applicable):  No results found for: LABABO, LABRH    Drug/Infectious Status (If Applicable):  No results found for: HIV, HEPCAB    COVID-19 Screening (If Applicable):   Lab Results   Component Value Date    COVID19 Not Detected 05/06/2021           Anesthesia Evaluation    Airway: Mallampati: II  TM distance: >3 FB   Neck ROM: full  Mouth opening: > = 3 FB Dental:          Pulmonary:   (+) asthma (no inhaler for 2 yr- only bad when exercises in very cold weather):     (-) shortness of breath                           Cardiovascular:  Exercise tolerance: good (>4 METS),       (-) hypertension, past MI and  angina                Neuro/Psych:      (-) seizures           GI/Hepatic/Renal:        (-) GERD       Endo/Other:                     Abdominal:           Vascular:                                        Anesthesia Plan      general     ASA 2     (-npo MN  -denies chest pain or palp  -\"last time for my wrist I had a nerve block and it didn't work at all, they had to redo it and then it was really good. \"  OK with nerve block today -adductor canal)  Induction: intravenous. MIPS: Postoperative opioids intended. Anesthetic plan and risks discussed with patient. Plan discussed with CRNA.                   Ricardo Castellanos MD   5/11/2021

## 2021-05-11 NOTE — ANESTHESIA PROCEDURE NOTES
Peripheral Block    Patient location during procedure: pre-op  Start time: 5/11/2021 9:38 AM  End time: 5/11/2021 9:40 AM  Staffing  Performed: anesthesiologist   Anesthesiologist: Donita Valdez MD  Preanesthetic Checklist  Completed: patient identified, IV checked, site marked, risks and benefits discussed, surgical consent, monitors and equipment checked, pre-op evaluation, timeout performed, anesthesia consent given, oxygen available and patient being monitored  Peripheral Block  Patient position: supine  Prep: ChloraPrep  Patient monitoring: cardiac monitor, continuous pulse ox, frequent blood pressure checks and IV access  Block type: Femoral  Laterality: right  Injection technique: single-shot  Guidance: ultrasound guided  Infiltration strength: 1 %  Dose: 3 mL  Approach to block: Low Femoral.  Provider prep: mask and sterile gloves  Needle  Needle type: combined needle/nerve stimulator   Needle gauge: 21 G  Needle length: 10 cm  Needle localization: ultrasound guidance  Assessment  Injection assessment: negative aspiration for heme, no paresthesia on injection and local visualized surrounding nerve on ultrasound  Paresthesia pain: none  Slow fractionated injection: yes  Hemodynamics: stable  Additional Notes  Immediately prior to procedure a \"time out\" was called to verify the correct patient, allergies, laterality, procedure and equipment. Time out performed with darrius RN    Local Anesthetic: 0.5 %  ropivacaine   Amount: 25 ml  in 5 ml increments after negative aspiration each time. Iliopsoas Muscle and Fascia Iliaca, Femoral artery (Deep artery to the thigh take off), Femoral Vein and Femoral Nerve are identified; the tip of the need and the spread of the local anesthetic around the Femoral nerve are visualized. The Femoral nerve appeared to be anatomically normal and there were no abnormal pathologically findings seen.          Medications Administered  Ropivacaine (NAROPIN) injection 0.5%, 25 mL Reason for block: post-op pain management and at surgeon's request

## 2021-05-11 NOTE — PROGRESS NOTES
[facility]U [facility]    Vitals:    05/11/21 1515   BP: 135/80   Pulse: 82   Resp: 15   Temp: 98.2 °F (36.8 °C)   SpO2: 100%         Intake/Output Summary (Last 24 hours) at 5/11/2021 1522  Last data filed at 5/11/2021 1511  Gross per 24 hour   Intake 1540 ml   Output --   Net 1540 ml       Pain assessment:  present - adequately treated  Pain Level: 4    Patient transferred to care of ELIZABETH RN.    5/11/2021 3:22 PM

## 2021-05-11 NOTE — PROGRESS NOTES
Ambulatory Surgery/Procedure Discharge Note    Vitals:    05/11/21 1531   BP: 121/68   Pulse: 62   Resp: 16   Temp: 97.1 °F (36.2 °C)   SpO2: 100%     Patient arrived to Phase II recovery alert and oriented. Vitals stable. Reports 3 out of 10 pain. Administered pain medication as ordered. Patient reports decrease in pain. Right leg covered in ACE bandage and immobilizer. No signs of drainage. Brother at bedside. Discharge instructions reviewed with patient and brother. Both verbalized understanding. In: 540 [P.O.:240; I.V.:300]  Out: -     Restroom use offered before discharge. Yes. Patient voided in restroom. Pain assessment:  present - adequately treated  Pain Level: 3        Patient discharged to home/self care. Patient discharged via wheel chair home with brother.       5/11/2021 4:44 PM

## 2021-05-12 ENCOUNTER — HOSPITAL ENCOUNTER (OUTPATIENT)
Dept: PHYSICAL THERAPY | Age: 31
Setting detail: THERAPIES SERIES
Discharge: HOME | End: 2021-05-12
Payer: COMMERCIAL

## 2021-05-12 ENCOUNTER — OFFICE VISIT (OUTPATIENT)
Dept: ORTHOPEDIC SURGERY | Age: 31
End: 2021-05-12

## 2021-05-12 VITALS — BODY MASS INDEX: 26.58 KG/M2 | HEIGHT: 63 IN | WEIGHT: 150 LBS | TEMPERATURE: 97.6 F

## 2021-05-12 DIAGNOSIS — Z98.890 S/P MEDIAL MENISCUS REPAIR OF RIGHT KNEE: ICD-10-CM

## 2021-05-12 DIAGNOSIS — Z98.890 S/P LATERAL MENISCUS REPAIR OF RIGHT KNEE: ICD-10-CM

## 2021-05-12 DIAGNOSIS — Z98.890 S/P ACL RECONSTRUCTION: Primary | ICD-10-CM

## 2021-05-12 PROCEDURE — 97110 THERAPEUTIC EXERCISES: CPT

## 2021-05-12 PROCEDURE — 99024 POSTOP FOLLOW-UP VISIT: CPT | Performed by: ORTHOPAEDIC SURGERY

## 2021-05-12 PROCEDURE — 97161 PT EVAL LOW COMPLEX 20 MIN: CPT

## 2021-05-12 NOTE — PLAN OF CARE
The [address] [place] [address]  Phone [phone]  Fax [phone]                                                       Physical Therapy Certification    Dear Referring Practitioner: Jeremiah Pete MD,    We had the pleasure of evaluating the following patient for physical therapy services at [address] [place] [address]. A summary of our findings can be found in the initial assessment below. This includes our plan of care. If you have any questions or concerns regarding these findings, please do not hesitate to contact me at the office phone number checked above.   Thank you for the referral.       Physician Signature:_______________________________Date:__________________  By signing above (or electronic signature), therapists plan is approved by physician      Patient: Denver Prophet   : 1990   MRN: [id number]  Referring Physician: Referring Practitioner: Jeremiah Pete MD      Evaluation Date: 2021      Medical Diagnosis Information:  Diagnosis: I71.901V (ICD-10-CM) - Rupture of anterior cruciate ligament of right knee, initial encounter   Treatment Diagnosis: M25.561 Right knee pain                                         Insurance information: PT Insurance Information: [employer]     Precautions/ Contra-indications: S/P R ACL with BPTB graft, medial and lateral meniscus tear 21    C-SSRS Triggered by Intake questionnaire (Past 2 wk assessment):   [x] No, Questionnaire did not trigger screening.   [] Yes, Patient intake triggered further evaluation      [] C-SSRS Screening completed  [] PCP notified via Plan of Care  [] Emergency services notified     Latex Allergy:  [x]NO      []YES  Preferred Language for Healthcare:   [x]English       []other:    SUBJECTIVE: Patient originally hurt R knee in 2021 after she landed awkwardly on weights after doing pull ups. Attempted to treat with rest and when she returned to olympic lifts she felt increase instability. Had MRI displaying bilateral meniscus tears and ACL tear. Pt had surgery 5/11/21. Reports having 3 stairs to enter home with bedroom and bathroom on first floor. Relevant Medical History: See attached medical history. No previous LE patholoies  Functional Disability Index: LEFI 89% deficit     Pain Scale: 4/10  Easing factors: Pain   Provocative factors: Lifting LE and bending knee     Type: [x]Constant   []Intermittent  [x]Radiating []Localized []other:     Numbness/Tingling: R foot d/t acute post op     Occupation/School: Nurse Practitioner    Living Status/Prior Level of Function: Independent with ADLs and IADLs,     OBJECTIVE:      Flexibility NT d/t acute post op L R Comment   Hamstring      Gastroc      ITB      Quad              ROM PROM AROM Overpressure Comment    L R L R L R    Flexion  90 with pain 153 NT      Extension  -8 0 -10                              Strength L R Comment   Quad 5 Atrophy d/t acute post op    Hamstring 5 NT    Hip  flexion  NT    Hip abd  NT                    Special Test Results/Comment   Homans Neg bilat           Girth L R   Mid Patella 34 40     Reflexes/Sensation:    [x]Dermatomes/Myotomes intact    []Reflexes equal and normal bilaterally   []Other:    Joint mobility: NT d/t acute post op     []Normal    []Hypo   []Hyper    Palpation: General tenderness d/t acute post op     Functional Mobility/Transfers: Modified independent with TROM locked at 0 deg and bilateral axillary crutches    Posture: Decrease knee extension in supine    Bandages/Dressings/Incisions: Incision closed with no active exudate or s/s of infection    Gait: TROM locked at 0 deg extension, bilateral axillary crutches    Orthopedic Special Tests: See above                       [x] Patient history, allergies, meds reviewed. Medical chart reviewed. See intake form.      Review Of Systems (ROS):  [x]Performed Review of systems (Integumentary, CardioPulmonary, Neurological) by intake and observation. Intake form has been scanned into medical record. Patient has been instructed to contact their primary care physician regarding ROS issues if not already being addressed at this time. Co-morbidities/Complexities (which will affect course of rehabilitation):   [x]None           Arthritic conditions   []Rheumatoid arthritis (M05.9)  []Osteoarthritis (M19.91)   Cardiovascular conditions   []Hypertension (I10)  []Hyperlipidemia (E78.5)  []Angina pectoris (I20)  []Atherosclerosis (I70)   Musculoskeletal conditions   []Disc pathology   []Congenital spine pathologies   []Prior surgical intervention  []Osteoporosis (M81.8)  []Osteopenia (M85.8)   Endocrine conditions   []Hypothyroid (E03.9)  []Hyperthyroid Gastrointestinal conditions   []Constipation (A42.00)   Metabolic conditions   []Morbid obesity (E66.01)  []Diabetes type 1(E10.65) or 2 (E11.65)   []Neuropathy (G60.9)     Pulmonary conditions   []Asthma (J45)  []Coughing   []COPD (J44.9)   Psychological Disorders  []Anxiety (F41.9)  []Depression (F32.9)   []Other:   []Other:          Barriers to/and or personal factors that will affect rehab potential:              []Age  []Sex              [x]Motivation/Lack of Motivation                        []Co-Morbidities              []Cognitive Function, education/learning barriers              []Environmental, home barriers              [x]profession/work barriers  []past PT/medical experience  []other:  Justification: Job duties and pt desire to get back to cross fit are potential barriers for trip. Falls Risk Assessment (30 days):   [x] Falls Risk assessed and no intervention required.   [] Falls Risk assessed and Patient requires intervention due to being higher risk   TUG score (>12s at risk):     [] Falls education provided, including     ASSESSMENT:  Functional Impairments:     []Noted lumbar/proximal hip/LE hypomobility   [x]Decreased LE functional ROM   [x]Decreased core/proximal hip strength and neuromuscular control   [x]Decreased LE functional strength   [x]Reduced balance/proprioceptive control   []other:      Functional Activity Limitations (from functional questionnaire and intake)   [x]Reduced ability to tolerate prolonged functional positions   [x]Reduced ability or difficulty with changes of positions or transfers between positions   [x]Reduced ability to maintain good posture and demonstrate good body mechanics with sitting, bending, and lifting   [x]Reduced ability to sleep   [x] Reduced ability or tolerance with driving and/or computer work   [x]Reduced ability to perform lifting, carrying tasks   [x]Reduced ability to squat   [x]Reduced ability to forward bend   [x]Reduced ability to ambulate prolonged functional periods/distances/surfaces   [x]Reduced ability to ascend/descend stairs   [x]Reduced ability to run, hop or jump   []other:     Participation Restrictions   [x]Reduced participation in self care activities   [x]Reduced participation in home management activities   [x]Reduced participation in work activities   [x]Reduced participation in social activities. [x]Reduced participation in sport activities. Classification :    [x]Signs/symptoms consistent with post-surgical status including decreased ROM, strength and function.    []Signs/symptoms consistent with joint sprain/strain   []Signs/symptoms consistent with patella-femoral syndrome   []Signs/symptoms consistent with knee OA/hip OA   []Signs/symptoms consistent with internal derangement of knee/Hip   []Signs/symptoms consistent with functional hip weakness/NMR control      []Signs/symptoms consistent with tendinitis/tendinosis    []signs/symptoms consistent with pathology which may benefit from Dry needling      []other:      Prognosis/Rehab Potential:      []Excellent   [x]Good    []Fair   []Poor    Tolerance of evaluation/treatment:    []Excellent   [x]Good    []Fair   []Poor    Physical Therapy Evaluation Complexity Justification  [x] A history of present problem with:  [x] no personal factors and/or comorbidities that impact the plan of care;  []1-2 personal factors and/or comorbidities that impact the plan of care  []3 personal factors and/or comorbidities that impact the plan of care  [x] An examination of body systems using standardized tests and measures addressing any of the following: body structures and functions (impairments), activity limitations, and/or participation restrictions;:  [x] a total of 1-2 or more elements   [] a total of 3 or more elements   [] a total of 4 or more elements   [x] A clinical presentation with:  [x] stable and/or uncomplicated characteristics   [] evolving clinical presentation with changing characteristics  [] unstable and unpredictable characteristics;   [x] Clinical decision making of [x] low, [] moderate, [] high complexity using standardized patient assessment instrument and/or measurable assessment of functional outcome. [x] EVAL (LOW) 34839 (typically 20 minutes face-to-face)  [] EVAL (MOD) 83492 (typically 30 minutes face-to-face)  [] EVAL (HIGH) 85421 (typically 45 minutes face-to-face)  [] RE-EVAL       PLAN  Frequency/Duration:  2 days per week for 16 Weeks:  Interventions:  [x]  Therapeutic exercise including: strength training, ROM, for Lower extremity and core   [x]  NMR activation and proprioception for LE, Glutes and Core   [x]  Manual therapy as indicated for LE, Hip and spine to include: Dry Needling/IASTM, STM, PROM, Gr I-IV mobilizations, manipulation. [x] Modalities as needed that may include: thermal agents, E-stim, Biofeedback, US, iontophoresis as indicated  [x] Patient education on joint protection, postural re-education, activity modification, progression of HEP. HEP instruction:   Access Code: Ingrid Delacruz: [email or web address]  Date: 05/12/2021  Prepared by: Delilah Valverde    Exercises  Supine Ankle Pumps - 7 x daily - 7 x weekly - 3 sets - 30 reps  Long Sitting Calf Stretch with Strap - 1-3 x daily - 7 x weekly - 5 reps - 1 sets - 30 hold  Seated Table Hamstring Stretch - 1-3 x daily - 7 x weekly - 5 reps - 1 sets - 30 hold  Sitting Heel Slide with Towel - 1-3 x daily - 7 x weekly - 1 sets - 10 reps - 10 hold  Supine Quad Set - 1-3 x daily - 7 x weekly - 10 reps - 1 sets - 10 hold  Supine Isometric Hip Adduction with Pillow at Feet - 1-3 x daily - 7 x weekly - 1 sets - 10 reps - 10 hold  Ice - 3-5 x daily - 7 x weekly - 1 sets - 1 reps - 15 hold    Patient Education  Going Up and Down Stairs with Crutches - Weight Bearing    GOALS:   Patient stated goal: Return to [place] [address]    [] Progressing: [] Met: [] Not Met: [] Adjusted    Therapist goals for Patient:   Short Term Goals: To be achieved in: 8 weeks  1. Independent in HEP and progression per patient tolerance, in order to prevent re-injury. [] Progressing: [] Met: [] Not Met: [] Adjusted   2. Patient will have a decrease in pain to facilitate improvement in movement, function, and ADLs as indicated by Functional Deficits. [] Progressing: [] Met: [] Not Met: [] Adjusted    Long Term Goals: To be achieved in: 16 weeks  1. Disability index score of 20% or less for the LEFS to assist with reaching prior level of function. [] Progressing: [] Met: [] Not Met: [] Adjusted  2. Patient will demonstrate increased R knee AROM that is equal to L to allow for proper joint functioning as indicated by patients Functional Deficits. [] Progressing: [] Met: [] Not Met: [] Adjusted  3. Patient will demonstrate an increase in R knee strength that is equal to L good proximal hip strength and control  in LE to allow for proper functional mobility as indicated by patients Functional Deficits. [] Progressing: [] Met: [] Not Met: [] Adjusted  4.  Patient will return to all functional activities without increased symptoms or restriction. [] Progressing: [] Met: [] Not Met: [] Adjusted  5. Patient will complete [employer] Isokinetic strength with 25% or less deficit of quadriceps and hamsrting strength compared to L as well as PKTQ/BW of 40% or greater of quadriceps and 30% of hamstring on L in order to begin return to running progression  [] Progressing: [] Met: [] Not Met: [] Adjusted       Electronically signed by:  Everett Montes PT    Note: If patient does not return for scheduled/ recommended follow up visits, this note will serve as a discharge from care along with most recent update on progress.

## 2021-05-12 NOTE — FLOWSHEET NOTE
[facility], [address] [place] [address], [address] [place] [address]  Phone: [phone]   Fax:     [phone]    Physical Therapy Daily Treatment Note  Date:  2021    Patient Name:  Fani Oconnor    :  1990  MRN: [id number]  Restrictions/Precautions:    Medical/Treatment Diagnosis Information:  · Diagnosis: D51.687Y (ICD-10-CM) - Rupture of anterior cruciate ligament of right knee, initial encounter  · Treatment Diagnosis: M25.561 Right knee pain  Insurance/Certification information:  PT Insurance Information: [employer]  Physician Information:  Referring Practitioner: Kassi Silva MD  Has the plan of care been signed (Y/N):        []  Yes  [x]  No     Date of Patient follow up with Physician:       Is this a Progress Report:     []  Yes  [x]  No        If Yes:  Date Range for reporting period:  Beginning 21  Ending    Progress report will be due (10 Rx or 30 days whichever is less): 46       Recertification will be due (POC Duration  / 90 days whichever is less): 21         Visit # Insurance Allowable Auth Required   1  1 of 12 visits [place]  12 visits eval included [x]  Yes []  No        Functional Scale: LEFS 89% deficit Date assessed:  21       Latex Allergy:  [x]NO      []YES  Preferred Language for Healthcare:   [x]English       []other:    Pain level:  10     SUBJECTIVE:  See eval    OBJECTIVE: See eval   Observation:    Test measurements:      RESTRICTIONS/PRECAUTIONS: S/P R ACL with BPTB graft, medial and lateral meniscus tear 21    Exercises/Interventions:   Exercise/Equipment Resistance/Repetitions Other comments   Stretching/AROM       Ankle pumps x30    Calf stretch 30\"hx3 R 5/12 Long sitting   Hamstring  30\"hx3 R 12 Seated EOT   Heel slides 10\"hx10 R 12               Strengthening      SAQ      SLR                               Neuromuscular re-ed      Quad sets 10\"hx10 R  Adduction isometric Educated on how to complete for HEP 5/12 long sitting with ball at feet   BrowserlingB                  Quantum machines       Leg press        Leg extension       Leg curl               Manual interventions                                   Therapeutic Exercise and NMR EXR  [x] (50567) Provided verbal/tactile cueing for activities related to strengthening, flexibility, endurance, ROM for improvements in LE, proximal hip, and core control with self care, mobility, lifting, ambulation.  [] (30541) Provided verbal/tactile cueing for activities related to improving balance, coordination, kinesthetic sense, posture, motor skill, proprioception  to assist with LE, proximal hip, and core control in self care, mobility, lifting, ambulation and eccentric single leg control.      NMR and Therapeutic Activities:    [] (99697 or 74267) Provided verbal/tactile cueing for activities related to improving balance, coordination, kinesthetic sense, posture, motor skill, proprioception and motor activation to allow for proper function of core, proximal hip and LE with self care and ADLs  [] (96625) Gait Re-education- Provided training and instruction to the patient for proper LE, core and proximal hip recruitment and positioning and eccentric body weight control with ambulation re-education including up and down stairs     Home Exercise Program:    [x] (74499) Reviewed/Progressed HEP activities related to strengthening, flexibility, endurance, ROM of core, proximal hip and LE for functional self-care, mobility, lifting and ambulation/stair navigation   [] (94091)Reviewed/Progressed HEP activities related to improving balance, coordination, kinesthetic sense, posture, motor skill, proprioception of core, proximal hip and LE for self care, mobility, lifting, and ambulation/stair navigation      Manual Treatments:  PROM / STM / Oscillations-Mobs:  G-I, II, III, IV (PA's, Inf., Post.)  [] (68563) Provided manual therapy to mobilize LE, proximal hip and/or LS spine soft tissue/joints for the purpose of modulating pain, promoting relaxation,  increasing ROM, reducing/eliminating soft tissue swelling/inflammation/restriction, improving soft tissue extensibility and allowing for proper ROM for normal function with self care, mobility, lifting and ambulation. Modalities:  CP x15' R knee 5/12/21    Charges:  Timed Code Treatment Minutes: 15'+eval   Total Treatment Minutes: 11:50-12:47  62'       [x] EVAL (LOW) 81973 (typically 20 minutes face-to-face)  [] EVAL (MOD) 27784 (typically 30 minutes face-to-face)  [] EVAL (HIGH) 23193 (typically 45 minutes face-to-face)  [] RE-EVAL   [x] AK(17093) x 1    [] IONTO  [] NMR (18619) x     [] VASO  [] Manual (14648) x      [] Other:  [] TA x      [] Mech Traction (65219)  [] ES(attended) (15081)      [] ES (un) (81841):     GOALS:   Patient stated goal: Return to [employer]    []? Progressing: []? Met: []? Not Met: []? Adjusted     Therapist goals for Patient:   Short Term Goals: To be achieved in: 8 weeks  1. Independent in HEP and progression per patient tolerance, in order to prevent re-injury. []? Progressing: []? Met: []? Not Met: []? Adjusted   2. Patient will have a decrease in pain to facilitate improvement in movement, function, and ADLs as indicated by Functional Deficits. []? Progressing: []? Met: []? Not Met: []? Adjusted     Long Term Goals: To be achieved in: 16 weeks  1. Disability index score of 20% or less for the LEFS to assist with reaching prior level of function. []? Progressing: []? Met: []? Not Met: []? Adjusted  2. Patient will demonstrate increased R knee AROM that is equal to L to allow for proper joint functioning as indicated by patients Functional Deficits. []? Progressing: []? Met: []? Not Met: []? Adjusted  3.  Patient will demonstrate an increase in R knee strength that is equal to L good proximal hip strength and control  in LE to allow for proper functional mobility as indicated by patients Functional Deficits. []? Progressing: []? Met: []? Not Met: []? Adjusted  4. Patient will return to all functional activities without increased symptoms or restriction. []? Progressing: []? Met: []? Not Met: []? Adjusted  5. Patient will complete Bi[employer]x Isokinetic strength with 25% or less deficit of quadriceps and hamsrting strength compared to L as well as PKTQ/BW of 40% or greater of quadriceps and 30% of hamstring on L in order to begin return to running progression  []? Progressing: []? Met: []? Not Met: []? Adjusted         Overall Progression Towards Functional goals/ Treatment Progress Update:  [] Patient is progressing as expected towards functional goals listed. [] Progression is slowed due to complexities/Impairments listed. [] Progression has been slowed due to co-morbidities. [x] Plan just implemented, too soon to assess goals progression <30days   [] Goals require adjustment due to lack of progress  [] Patient is not progressing as expected and requires additional follow up with physician  [] Other    Prognosis for POC: [x] Good [] Fair  [] Poor      Patient requires continued skilled intervention: [x] Yes  [] No    Treatment/Activity Tolerance:  [x] Patient able to complete treatment  [] Patient limited by fatigue  [x] Patient limited by pain     [] Patient limited by other medical complications  [] Other:   5/12 Withheld hip adduction isometric d/t increase pain from being acute post op. Patient Education:           5/12 Educated on importance of ice/elevation to decrease swelling as well as proper stair negotiation with crutches and non weight bearing on R. Also reviewed importance of consistency with HEP. HEP instruction:   Access Code: Lauro Noonan: [email or web address]  Date: 05/12/2021  Prepared by: Mary Monterroso     Exercises  Supine Ankle Pumps - 7 x daily - 7 x weekly - 3 sets - 30 reps  Long Sitting Calf Stretch with Strap - 1-3 x daily - 7 x weekly - 5 reps - 1 sets - 30 hold  Seated Table Hamstring Stretch - 1-3 x daily - 7 x weekly - 5 reps - 1 sets - 30 hold  Sitting Heel Slide with Towel - 1-3 x daily - 7 x weekly - 1 sets - 10 reps - 10 hold  Supine Quad Set - 1-3 x daily - 7 x weekly - 10 reps - 1 sets - 10 hold  Supine Isometric Hip Adduction with Pillow at Feet - 1-3 x daily - 7 x weekly - 1 sets - 10 reps - 10 hold  Ice - 3-5 x daily - 7 x weekly - 1 sets - 1 reps - 15 hold     Patient Education  Going Up and Down Stairs with Crutches - Weight Bearing        PLAN: See eval  [] Continue per plan of care [] Alter current plan (see comments above)  [x] Plan of care initiated [] Hold pending MD visit [] Discharge      Electronically signed by:  Tung Lares, PT    Note: If patient does not return for scheduled/ recommended follow up visits, this note will serve as a discharge from care along with most recent update on progress.

## 2021-05-12 NOTE — OP NOTE
medially,  laterally, and anteriorly. ACL was torn from femoral attachment site. A tear involving the posterior horn of the medial meniscus which was  unstable was apparent. Medial articular cartilage surfaces appeared  intact. A Wrisberg rip-type tear involving the posterior horn of the  lateral meniscus was also present with a double longitudinal component. The tears were deemed repairable and both were abraded with a synovial  rasp. The lateral meniscus was repaired using three all-inside  FiberWire sutures placed with a meniscal Scorpion. They were tied down  circumferentially and restored stability to the posterior horn. The  medial meniscus tear was repaired with two Arthrex all-inside meniscal  suture anchors. These were placed in a circumferential fashion and  restored stability to the tear. The stump of the ACL was then resected. The notchplasty was performed. Center of the ACL tibial footprint was  identified and confirmed by placing the scope through the medial portal.  The scope was removed from the knee. An incision was made over the  anterior aspect of the knee and carried down to the patellar tendon  sheath. The patellar tendon was identified and a 1 cm wide patellar  tendon graft was harvested with bone plugs from the patella and the  tibia. A periosteal window was prepared over the anteromedial tibia and  a pin was placed in this window to the center of the ACL tibial  footprint, overreamed with 10 mm reamer, and bone graft was collected  for later use in the patella. We used a [employer] drill guide through  an anteromedial portal to direct a flexible pin from the previously  marked position in the center of the ACL femoral footprint out through  the anterolateral femoral cortex and then reamed it to appropriate  depth. Debris was flushed from the joint. Bone graft was collected for  later use in the patella.   The patella graft was then pulled through the  tibial tunnel into the femoral tunnel and secured using 7 x 20 mm  interference fit Gene screw. The knee was taken through several  flexion and extension cycles. The tension was applied and was noted to  be isometric. With the knee in 15 degrees of flexion, an 8 x 25 mm  interference fit Gene screw was placed to achieve excellent fixation. Knee was taken through 20 flexion and extension cycles and no compromise  of fixation apparent. The tourniquet was then deflated. Periosteal  window was closed with Vicryl suture. The patellar tendon was closed  with Vicryl suture. Bone grafts were placed in the patellar donor site. Patellar tendon sheath was closed. The wound was irrigated with  antibiotic-containing normal saline solution and subcutaneous tissue was  closed in layers with Vicryl. Skin was closed with Monocryl. Sterile  dressing was applied. The patient was awakened and taken to the  recovery room in stable condition. Sponge and needle counts correct at  the conclusion of the procedure.   Blood loss was minimal.        Oriana Palomares MD    D: 05/11/2021 13:51:02       T: 05/11/2021 15:07:00     [email or web address]  Job#: [id number]     Doc#: [id number]    CC:

## 2021-05-12 NOTE — PROGRESS NOTES
Chief Complaint  Post-Op Check (right knee. s/p acl )      History of Present Illness:  Fani Oconnor is a pleasant 27 y.o. female who presents today for follow up evaluation of right knee pain. She is 1 day status post right knee arthroscopy, ACL reconstruction of patellar tendon autograft, medial meniscus repair, lateral meniscus repair on 5/11/2021. Pain is controlled. Medical History:  Patient's medications, allergies, past medical, surgical, social and family histories were reviewed and updated as appropriate. Pertinent items are noted in HPI  Review of systems reviewed from Patient History Form completed today and available in the patient's chart under the Media tab. Pain Assessment  Location of Pain: Knee  Location Modifiers: Right  Severity of Pain: 4  Quality of Pain: Dull, Aching  Frequency of Pain: Constant  Aggravating Factors: (ANY MOVEMENT)  Limiting Behavior: Yes  Relieving Factors: Rest, Ice, Nsaids  Result of Injury: Yes  Work-Related Injury: No  Are there other pain locations you wish to document?: No    Past Medical History:   Diagnosis Date    Uncomplicated asthma         Past Surgical History:   Procedure Laterality Date    ANTERIOR CRUCIATE LIGAMENT REPAIR Right 5/11/2021    RIGHT KNEE ARTHROSOCPY, ANTERIOR CRUCIATE LIGAMENT RECONSTRUCTION PATELLA TENDON, MEDIAL AND LATERAL MENISCUS REPAIR performed by Claudia Cueto MD at [address] Right     orf right wrist        History reviewed. No pertinent family history.     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[employer] strain: None    Food insecurity     Worry: None     Inability: None    Transportation needs     Medical: None     Non-medical: None   Tobacco Use  Smoking status: Never Smoker    Smokeless tobacco: Never Used   Substance and Sexual Activity    Alcohol use: Yes     Alcohol/week: 1.0 standard drinks     Types: 1 Glasses of wine per week     Comment: 2-3 a week     Drug use: Not Currently    Sexual activity: None   Lifestyle    Physical activity     Days per week: None     Minutes per session: None    Stress: None   Relationships    Social connections     Talks on phone: None     Gets together: None     Attends Sikh service: None     Active member of club or organization: None     Attends meetings of clubs or organizations: None     Relationship status: None    Intimate partner violence     Fear of current or ex partner: None     Emotionally abused: None     Physically abused: None     Forced sexual activity: None   Other Topics Concern    None   Social History Narrative    None       Current Outpatient Medications   Medication Sig Dispense Refill    oxyCODONE-acetaminophen (PERCOCET) 5-325 MG per tablet Take 1-2 tablets by mouth every 4 hours as needed for Pain for up to 7 days. Intended supply: 7 days. Take lowest dose possible to manage pain. Do not take with tylenol 42 tablet 0    ondansetron (ZOFRAN) 4 MG tablet Take 1 tablet by mouth every 8 hours as needed for Nausea or Vomiting 30 tablet 0    senna-docusate (PERICOLACE) 8.6-50 MG per tablet Take 2 tablets by mouth daily as needed for Constipation 30 tablet 0    aspirin 325 MG EC tablet Take 1 tablet by mouth daily for 21 days 21 tablet 0     No current facility-administered medications for this visit. Allergies   Allergen Reactions    Amoxicillin      Hives    Hydrocodone      Facial rash       Vital signs:  Temp 97.6 °F (36.4 °C)   Ht 5' 3\" (1.6 m)   Wt 150 lb (68 kg)   BMI 26.57 kg/m²              RIGHT Knee Examination:    Gait: no use of assisted devices    Alignment: Alignment appreciated. Inspection/skin: Incisions healing well. No indication of infection.  No dehiscence. Skin is intact without erythema or ecchymosis. No gross deformity. Palpation: No point tenderness. Nontender to light touch. Range of Motion: Able to flexion beyond 90 without significant discomfort. Strength: Able to perform straight leg raise. Mild quad weakness. Effusion: Mild effusion    Ligamentous stability: Solid endpoint with Lachman's. No gross instability. Patella tracking: Smooth translation of patella. Neurologic and vascular: Skin is warm and well-perfused. Distally neurovascularly intact. Additional findings: Calf soft nontender. Sensation is intact to light-touch. No pretibial edema. Radiology:     Pertinent imaging was interpreted and reviewed with the patient. No new imaging was obtained during today's visit. Assessment :  1 day status post right knee arthroscopy, ACL reconstruction of patellar tendon autograft, medial meniscus repair, lateral meniscus repair on 5/11/2021    Impression:  Encounter Diagnoses   Name Primary?  S/P ACL reconstruction Yes    S/P medial meniscus repair of right knee     S/P lateral meniscus repair of right knee        Office Procedures:  No orders of the defined types were placed in this encounter. Plan: Pertinent imaging was reviewed. The etiology, natural history, and treatment options for the disorder were discussed. The roles of activity medication, antiinflammatories, injections, bracing, physical therapy, and surgical interventions were all described to the patient and questions were answered. She will start PT. Able to discontinue stocking. Continue aspirin. Very unstable lateral meniscus tear, toe touch weight bearing in locked brace for 2 weeks increasing 25% every week, range of motion 0-90 degrees immediately, quad isometrics. Ok to unlock brace for exercises. I will see her back  Evertt Bue is in agreement with this plan.  All questions were answered to patient's satisfaction and was encouraged to call with any further questions. I, Arline Dumas ATC, am scribing for and in the presence of Dr. Bradford Pastrana. 05/12/21 11:21 AM Arline Dumas ATC    I attest that I met personally with the patient, performed the described exam, reviewed the radiographic studies and medical records associated with this patient and supervised the services that are described above.      Darnell Zimmerman MD

## 2021-05-14 ENCOUNTER — HOSPITAL ENCOUNTER (OUTPATIENT)
Dept: PHYSICAL THERAPY | Age: 31
Setting detail: THERAPIES SERIES
Discharge: HOME | End: 2021-05-14
Payer: COMMERCIAL

## 2021-05-14 PROCEDURE — 97110 THERAPEUTIC EXERCISES: CPT | Performed by: PHYSICAL THERAPIST

## 2021-05-14 PROCEDURE — 97112 NEUROMUSCULAR REEDUCATION: CPT | Performed by: PHYSICAL THERAPIST

## 2021-05-14 NOTE — FLOWSHEET NOTE
The [address] [place] [address],[address], [address], [address] [place] [address]  Phone: [phone]   Fax:     [phone]    Physical Therapy Daily Treatment Note  Date:  2021    Patient Name:  Genna Neal    :  1990  MRN: [id number]  Restrictions/Precautions:    Medical/Treatment Diagnosis Information:  · Diagnosis: F68.593U (ICD-10-CM) - Rupture of anterior cruciate ligament of right knee, initial encounter  · Treatment Diagnosis: M25.561 Right knee pain  Insurance/Certification information:  PT Insurance Information: [place]  Physician Information:  Referring Practitioner: Jadon Walton MD  Has the plan of care been signed (Y/N):        []  Yes  [x]  No     Date of Patient follow up with Physician:       Is this a Progress Report:     []  Yes  [x]  No        If Yes:  Date Range for reporting period:  Beginning 21  Ending    Progress report will be due (10 Rx or 30 days whichever is less): 60       Recertification will be due (POC Duration  / 90 days whichever is less): 21         Visit # Insurance Allowable Auth Required   2  2 of 12 visits [place]  12 visits eval included end date 21 [x]  Yes []  No        Functional Scale: LEFS 89% deficit Date assessed:  21       Latex Allergy:  [x]NO      []YES  Preferred Language for Healthcare:   [x]English       []other:    Pain level:  2/10 5/14     SUBJECTIVE:   feeling better today    OBJECTIVE: See eval   Observation: 0-90 sheet pull          RESTRICTIONS/PRECAUTIONS: S/P R ACL with BPTB graft, medial and lateral meniscus tear 21    Exercises/Interventions:   Exercise/Equipment Resistance/Repetitions Other comments   Stretching/AROM       Ankle pumps x30    Calf stretch 30\"hx3 R 5/12 Long sitting   Hamstring  30\"hx3 R 5/12 Seated EOT   Heel slides 10\"hx10 R 12               Strengthening      SAQ      SLR  3x10      SL abd  3x10  start Neuromuscular re-ed      Quad sets 10\"hx10 R 5/12   Adduction isometric Educated on how to complete for HEP 5/12 long sitting with ball at feet   SLB                  Quantum machines       Leg press        Leg extension       Leg curl               Manual interventions                                   Therapeutic Exercise and NMR EXR  [x] (37280) Provided verbal/tactile cueing for activities related to strengthening, flexibility, endurance, ROM for improvements in LE, proximal hip, and core control with self care, mobility, lifting, ambulation.  [] (68709) Provided verbal/tactile cueing for activities related to improving balance, coordination, kinesthetic sense, posture, motor skill, proprioception  to assist with LE, proximal hip, and core control in self care, mobility, lifting, ambulation and eccentric single leg control.      NMR and Therapeutic Activities:    [] (76794 or 72828) Provided verbal/tactile cueing for activities related to improving balance, coordination, kinesthetic sense, posture, motor skill, proprioception and motor activation to allow for proper function of core, proximal hip and LE with self care and ADLs  [] (14704) Gait Re-education- Provided training and instruction to the patient for proper LE, core and proximal hip recruitment and positioning and eccentric body weight control with ambulation re-education including up and down stairs     Home Exercise Program:    [x] (90466) Reviewed/Progressed HEP activities related to strengthening, flexibility, endurance, ROM of core, proximal hip and LE for functional self-care, mobility, lifting and ambulation/stair navigation   [] (37145)Reviewed/Progressed HEP activities related to improving balance, coordination, kinesthetic sense, posture, motor skill, proprioception of core, proximal hip and LE for self care, mobility, lifting, and ambulation/stair navigation      Manual Treatments:  PROM / STM / Oscillations-Mobs:  G-I, II, III, IV (PA's, Inf., Post.)  [] (33862) Provided manual therapy to mobilize LE, proximal hip and/or LS spine soft tissue/joints for the purpose of modulating pain, promoting relaxation,  increasing ROM, reducing/eliminating soft tissue swelling/inflammation/restriction, improving soft tissue extensibility and allowing for proper ROM for normal function with self care, mobility, lifting and ambulation. Modalities:  CP x15' R knee 5/12/21    Charges:  Timed Code Treatment Minutes:  Beth Be  50   Total Treatment Minutes: 774-965       [] EVAL (LOW) 88387 (typically 20 minutes face-to-face)  [] EVAL (MOD) 87675 (typically 30 minutes face-to-face)  [] EVAL (HIGH) 27766 (typically 45 minutes face-to-face)  [] RE-EVAL   [x] RS(13292) x 2    [] IONTO  [x] NMR (94468) x 1    [] VASO  [] Manual (65619) x      [] Other:  [] TA x      [] Mech Traction (50071)  [] ES(attended) (28697)      [] ES (un) (33672):     GOALS:   Patient stated goal: Return to [employer]    []? Progressing: []? Met: []? Not Met: []? Adjusted     Therapist goals for Patient:   Short Term Goals: To be achieved in: 8 weeks  1. Independent in HEP and progression per patient tolerance, in order to prevent re-injury. []? Progressing: []? Met: []? Not Met: []? Adjusted   2. Patient will have a decrease in pain to facilitate improvement in movement, function, and ADLs as indicated by Functional Deficits. []? Progressing: []? Met: []? Not Met: []? Adjusted     Long Term Goals: To be achieved in: 16 weeks  1. Disability index score of 20% or less for the LEFS to assist with reaching prior level of function. []? Progressing: []? Met: []? Not Met: []? Adjusted  2. Patient will demonstrate increased R knee AROM that is equal to L to allow for proper joint functioning as indicated by patients Functional Deficits. []? Progressing: []? Met: []? Not Met: []? Adjusted  3.  Patient will demonstrate an increase in R knee strength that is equal to L good proximal hip strength and control  in LE to allow for proper functional mobility as indicated by patients Functional Deficits. []? Progressing: []? Met: []? Not Met: []? Adjusted  4. Patient will return to all functional activities without increased symptoms or restriction. []? Progressing: []? Met: []? Not Met: []? Adjusted  5. Patient will complete Biodex Isokinetic strength with 25% or less deficit of quadriceps and hamsrting strength compared to L as well as PKTQ/BW of 40% or greater of quadriceps and 30% of hamstring on L in order to begin return to running progression  []? Progressing: []? Met: []? Not Met: []? Adjusted         Overall Progression Towards Functional goals/ Treatment Progress Update:  [] Patient is progressing as expected towards functional goals listed. [] Progression is slowed due to complexities/Impairments listed. [] Progression has been slowed due to co-morbidities. [x] Plan just implemented, too soon to assess goals progression <30days   [] Goals require adjustment due to lack of progress  [] Patient is not progressing as expected and requires additional follow up with physician  [] Other    Prognosis for POC: [x] Good [] Fair  [] Poor      Patient requires continued skilled intervention: [x] Yes  [] No    Treatment/Activity Tolerance:  [x] Patient able to complete treatment  [] Patient limited by fatigue  [x] Patient limited by some pain     [] Patient limited by other medical complications  [x] Other: able to perform SLR and SL abd. reviewed NWB  At this time and ROM 0-90 only      Patient Education:           5/12 Educated on importance of ice/elevation to decrease swelling as well as proper stair negotiation with crutches and non weight bearing on R. Also reviewed importance of consistency with HEP. HEP instruction:   Access Code: Lauro Noonan: [email or web address]/  Date: 05/12/2021  Prepared by: Mary Monterroso     Exercises  Supine Ankle Pumps - 7 x daily - 7 x weekly - 3 sets - 30 reps  Long Sitting Calf Stretch with Strap - 1-3 x daily - 7 x weekly - 5 reps - 1 sets - 30 hold  Seated Table Hamstring Stretch - 1-3 x daily - 7 x weekly - 5 reps - 1 sets - 30 hold  Sitting Heel Slide with Towel - 1-3 x daily - 7 x weekly - 1 sets - 10 reps - 10 hold  Supine Quad Set - 1-3 x daily - 7 x weekly - 10 reps - 1 sets - 10 hold  Supine Isometric Hip Adduction with Pillow at Feet - 1-3 x daily - 7 x weekly - 1 sets - 10 reps - 10 hold  Ice - 3-5 x daily - 7 x weekly - 1 sets - 1 reps - 15 hold     Patient Education  Going Up and Down Stairs with Crutches - Weight Bearing        PLAN: See eval  [] Continue per plan of care [] Alter current plan (see comments above)  [x] Plan of care initiated [] Hold pending MD visit [] Discharge      Electronically signed by:  Landen Feldman PT    Note: If patient does not return for scheduled/ recommended follow up visits, this note will serve as a discharge from care along with most recent update on progress.

## 2021-05-19 ENCOUNTER — HOSPITAL ENCOUNTER (OUTPATIENT)
Dept: PHYSICAL THERAPY | Age: 31
Setting detail: THERAPIES SERIES
Discharge: HOME | End: 2021-05-19
Payer: COMMERCIAL

## 2021-05-19 PROCEDURE — 97112 NEUROMUSCULAR REEDUCATION: CPT

## 2021-05-19 PROCEDURE — 97110 THERAPEUTIC EXERCISES: CPT

## 2021-05-19 NOTE — FLOWSHEET NOTE
The [facility], 045 @[address] [place] [address], [address] [place] [address]  Phone: [phone]   Fax:     [phone]    Physical Therapy Daily Treatment Note  Date:  2021    Patient Name:  Naomi Ho    :  1990  MRN: [id number]  Restrictions/Precautions:    Medical/Treatment Diagnosis Information:  · Diagnosis: H64.300C (ICD-10-CM) - Rupture of anterior cruciate ligament of right knee, initial encounter  · Treatment Diagnosis: M25.561 Right knee pain  Insurance/Certification information:  PT Insurance Information: [place]  Physician Information:  Referring Practitioner: Redgie Apgar, MD  Has the plan of care been signed (Y/N):        []  Yes  [x]  No     Date of Patient follow up with Physician:       Is this a Progress Report:     []  Yes  [x]  No        If Yes:  Date Range for reporting period:  Beginning 21  Ending    Progress report will be due (10 Rx or 30 days whichever is less): 51       Recertification will be due (POC Duration  / 90 days whichever is less): 21         Visit # Insurance Allowable Auth Required   2  2 of 12 visits [place]  12 visits eval included end date 21 [x]  Yes []  No        Functional Scale: LEFS 89% deficit Date assessed:  21       Latex Allergy:  [x]NO      []YES  Preferred Language for Healthcare:   [x]English       []other:    Pain level:  2/10 5/19     SUBJECTIVE:   Pt states she is only taking pain medication two times a day with combination of Motrin. Pain level and swelling is improving. Completing HEP with no issues but wants to make sure she is not pushing knee flexion beyond what she should.       OBJECTIVE: See eval   Observation: Mild swelling R knee with bruising at posterior knee and anterior aspect of lower leg to medial aspect of R calf  ROM PROM AROM Overpressure Comment     L R  L R  L R     Flexion   99 with pain 153 NT         Extension   0 0 0     Exercise Program:    [x] (22729) Reviewed/Progressed HEP activities related to strengthening, flexibility, endurance, ROM of core, proximal hip and LE for functional self-care, mobility, lifting and ambulation/stair navigation   [] (90979)Reviewed/Progressed HEP activities related to improving balance, coordination, kinesthetic sense, posture, motor skill, proprioception of core, proximal hip and LE for self care, mobility, lifting, and ambulation/stair navigation      Manual Treatments:  PROM / STM / Oscillations-Mobs:  G-I, II, III, IV (PA's, Inf., Post.)  [] (25882) Provided manual therapy to mobilize LE, proximal hip and/or LS spine soft tissue/joints for the purpose of modulating pain, promoting relaxation,  increasing ROM, reducing/eliminating soft tissue swelling/inflammation/restriction, improving soft tissue extensibility and allowing for proper ROM for normal function with self care, mobility, lifting and ambulation. Modalities:  CP x15' R knee 5/19/21    Charges:  Timed Code Treatment Minutes:  46'   Total Treatment Minutes: 10:43-11:54  70'       [] EVAL (LOW) 80286 (typically 20 minutes face-to-face)  [] EVAL (MOD) 83959 (typically 30 minutes face-to-face)  [] EVAL (HIGH) 54664 (typically 45 minutes face-to-face)  [] RE-EVAL   [x] EO(60542) x 2    [] IONTO  [x] NMR (66438) x 1    [] VASO  [] Manual (18355) x      [] Other:  [] TA x      [] Mech Traction (22128)  [] ES(attended) (45054)      [] ES (un) (33842):     GOALS:   Patient stated goal: Return to [employer]    []? Progressing: []? Met: []? Not Met: []? Adjusted     Therapist goals for Patient:   Short Term Goals: To be achieved in: 8 weeks  1. Independent in HEP and progression per patient tolerance, in order to prevent re-injury. []? Progressing: []? Met: []? Not Met: []? Adjusted   2. Patient will have a decrease in pain to facilitate improvement in movement, function, and ADLs as indicated by Functional Deficits. []?  Progressing: []? Met: []? Not Met: []? Adjusted     Long Term Goals: To be achieved in: 16 weeks  1. Disability index score of 20% or less for the LEFS to assist with reaching prior level of function. []? Progressing: []? Met: []? Not Met: []? Adjusted  2. Patient will demonstrate increased R knee AROM that is equal to L to allow for proper joint functioning as indicated by patients Functional Deficits. []? Progressing: []? Met: []? Not Met: []? Adjusted  3. Patient will demonstrate an increase in R knee strength that is equal to L good proximal hip strength and control  in LE to allow for proper functional mobility as indicated by patients Functional Deficits. []? Progressing: []? Met: []? Not Met: []? Adjusted  4. Patient will return to all functional activities without increased symptoms or restriction. []? Progressing: []? Met: []? Not Met: []? Adjusted  5. Patient will complete Biodex Isokinetic strength with 25% or less deficit of quadriceps and hamsrting strength compared to L as well as PKTQ/BW of 40% or greater of quadriceps and 30% of hamstring on L in order to begin return to running progression  []? Progressing: []? Met: []? Not Met: []? Adjusted         Overall Progression Towards Functional goals/ Treatment Progress Update:  [] Patient is progressing as expected towards functional goals listed. [] Progression is slowed due to complexities/Impairments listed. [] Progression has been slowed due to co-morbidities.   [x] Plan just implemented, too soon to assess goals progression <30days   [] Goals require adjustment due to lack of progress  [] Patient is not progressing as expected and requires additional follow up with physician  [] Other    Prognosis for POC: [x] Good [] Fair  [] Poor      Patient requires continued skilled intervention: [x] Yes  [] No    Treatment/Activity Tolerance:  [x] Patient able to complete treatment  [] Patient limited by fatigue  [x] Patient limited by some pain     [] Patient limited by other medical complications  [x] Other: 5/19 Pt knee ROM improving and demonstrated improved quad activation with [place]ian stimulation during quad sets allowing her to complete SLR flexion with weight. Cues during SLR abduction to avoid hip rotation and compensation from hip flexors. Overall responding well to treatment       Patient Education:           5/19 Educated on precautions with knee flexion ROM and to not push through pain but work on gentle ROM. Educated on continued use of ice to decrease swelling and for pain control as she progresses off of pain medication   5/12 Educated on importance of ice/elevation to decrease swelling as well as proper stair negotiation with crutches and non weight bearing on R. Also reviewed importance of consistency with HEP. HEP instruction:   Access Code: Ky Sanchez: [email or web address]/  Date: 05/12/2021  Prepared by: Consuelo Crocker         PLAN: See eval  [x] Continue per plan of care [] Alter current plan (see comments above)  [x] Plan of care initiated [] Hold pending MD visit [] Discharge  5/19 Progress as tolerated based on MD protocol. Electronically signed by:  Melissa Sharp PT    Note: If patient does not return for scheduled/ recommended follow up visits, this note will serve as a discharge from care along with most recent update on progress.

## 2021-05-21 ENCOUNTER — HOSPITAL ENCOUNTER (OUTPATIENT)
Dept: PHYSICAL THERAPY | Age: 31
Setting detail: THERAPIES SERIES
Discharge: HOME | End: 2021-05-21
Payer: COMMERCIAL

## 2021-05-21 PROCEDURE — 97112 NEUROMUSCULAR REEDUCATION: CPT

## 2021-05-21 PROCEDURE — 97110 THERAPEUTIC EXERCISES: CPT

## 2021-05-21 NOTE — FLOWSHEET NOTE
The [facility], [address] [place] [address], [address] [place] [address]  Phone: [phone]   Fax:     [phone]    Physical Therapy Daily Treatment Note  Date:  2021    Patient Name:  Libia Gary    :  1990  MRN: [id number]  Restrictions/Precautions:    Medical/Treatment Diagnosis Information:  · Diagnosis: C87.999V (ICD-10-CM) - Rupture of anterior cruciate ligament of right knee, initial encounter  · Treatment Diagnosis: M25.561 Right knee pain  Insurance/Certification information:  PT Insurance Information: [place]  Physician Information:  Referring Practitioner: Rigoberto Mcnamara MD  Has the plan of care been signed (Y/N):        []  Yes  [x]  No     Date of Patient follow up with Physician:       Is this a Progress Report:     []  Yes  [x]  No        If Yes:  Date Range for reporting period:  Beginning 21  Ending    Progress report will be due (10 Rx or 30 days whichever is less): 42       Recertification will be due (POC Duration  / 90 days whichever is less): 21         Visit # Insurance Allowable Auth Required   3  3 of 12 visits [place]  12 visits eval included end date 21 [x]  Yes []  No        Functional Scale: LEFS 89% deficit Date assessed:  21       Latex Allergy:  [x]NO      []YES  Preferred Language for Healthcare:   [x]English       []other:    Pain level:  0/10      SUBJECTIVE:   Pt states she heard a squeak in her knee last with knee flexion/ extension when laying on L side. Denies having pain with but was not wearing TROM.  Has stopped taking pain medication but continues to take Motrin      OBJECTIVE: See eval   Observation: Mild swelling R knee with bruising at posterior knee and anterior aspect of lower leg to medial aspect of R calf   ROM PROM AROM Overpressure Comment     L R  L R  L R     Flexion   99 with pain 153 NT         Extension   0 0 0          RESTRICTIONS/PRECAUTIONS: S/P R ACL with BPTB graft, medial and lateral meniscus tear 5/12/21    Exercises/Interventions:   Exercise/Equipment Resistance/Repetitions Other comments   Stretching/AROM       Ankle pumps 5/19 cont with HEP    Calf stretch 30\"hx3 R 5/12 Long sitting   Hamstring  30\"hx3 R 5/12 Seated EOT   Heel slides 10\"hx10 R 5/12   SB roll ins 5\"h 2x10 bilat 5/19 supine           Strengthening      SLR 1.5# 3x10 ^5/21   SLR abd 1.5#  3x10 ^5/21                   Neuromuscular re-ed      Quad sets 10\"on:10\" off x10' with [place] stimulation ^5/19    Adduction isometric Cont with HEP 5/19   TKE [place] TB 3x10 R ^5/21 foot on floor with no weight through leg, bilateral axillary crutches                Quantum machines       Leg press        Leg extension       Leg curl               Manual interventions                                   Therapeutic Exercise and NMR EXR  [x] (44526) Provided verbal/tactile cueing for activities related to strengthening, flexibility, endurance, ROM for improvements in LE, proximal hip, and core control with self care, mobility, lifting, ambulation.  [] (95268) Provided verbal/tactile cueing for activities related to improving balance, coordination, kinesthetic sense, posture, motor skill, proprioception  to assist with LE, proximal hip, and core control in self care, mobility, lifting, ambulation and eccentric single leg control.      NMR and Therapeutic Activities:    [] (94582 or 02821) Provided verbal/tactile cueing for activities related to improving balance, coordination, kinesthetic sense, posture, motor skill, proprioception and motor activation to allow for proper function of core, proximal hip and LE with self care and ADLs  [] (33653) Gait Re-education- Provided training and instruction to the patient for proper LE, core and proximal hip recruitment and positioning and eccentric body weight control with ambulation re-education including up and down stairs Home Exercise Program:    [x] (47817) Reviewed/Progressed HEP activities related to strengthening, flexibility, endurance, ROM of core, proximal hip and LE for functional self-care, mobility, lifting and ambulation/stair navigation   [] (01904)Reviewed/Progressed HEP activities related to improving balance, coordination, kinesthetic sense, posture, motor skill, proprioception of core, proximal hip and LE for self care, mobility, lifting, and ambulation/stair navigation      Manual Treatments:  PROM / STM / Oscillations-Mobs:  G-I, II, III, IV (PA's, Inf., Post.)  [] (25544) Provided manual therapy to mobilize LE, proximal hip and/or LS spine soft tissue/joints for the purpose of modulating pain, promoting relaxation,  increasing ROM, reducing/eliminating soft tissue swelling/inflammation/restriction, improving soft tissue extensibility and allowing for proper ROM for normal function with self care, mobility, lifting and ambulation. Modalities:  CP x15' R knee 5/21/21    Charges:  Timed Code Treatment Minutes:  39'   Total Treatment Minutes: 9:05-10:06  64'       [] EVAL (LOW) 55775 (typically 20 minutes face-to-face)  [] EVAL (MOD) 77415 (typically 30 minutes face-to-face)  [] EVAL (HIGH) 88308 (typically 45 minutes face-to-face)  [] RE-EVAL   [x] TI(54772) x 2    [] IONTO  [x] NMR (44821) x 1    [] VASO  [] Manual (87540) x      [] Other:  [] TA x      [] Mech Traction (14294)  [] ES(attended) (74566)      [] ES (un) (12176):     GOALS:   Patient stated goal: Return to [employer]    []? Progressing: []? Met: []? Not Met: []? Adjusted     Therapist goals for Patient:   Short Term Goals: To be achieved in: 8 weeks  1. Independent in HEP and progression per patient tolerance, in order to prevent re-injury. []? Progressing: []? Met: []? Not Met: []? Adjusted   2. Patient will have a decrease in pain to facilitate improvement in movement, function, and ADLs as indicated by Functional Deficits.     []? [] Patient limited by other medical complications  [x] Other: 5/21 Pt demonstrated improved quad activation with [place] stimulation during quad sets allowing her to complete SLR flexion with increase weight. Overall responding well to treatment       Patient Education:           5/19 Educated on precautions with knee flexion ROM and to not push through pain but work on gentle ROM. Educated on continued use of ice to decrease swelling and for pain control as she progresses off of pain medication   5/12 Educated on importance of ice/elevation to decrease swelling as well as proper stair negotiation with crutches and non weight bearing on R. Also reviewed importance of consistency with HEP. HEP instruction:   Access Code: Tanishalogan Loracassidy: [email or web address]/  Date: 05/12/2021  Prepared by: Yevgeniy Mg         PLAN: See eval  [x] Continue per plan of care [] Alter current plan (see comments above)  [x] Plan of care initiated [] Hold pending MD visit [] Discharge  5/21 Progress as tolerated based on MD protocol. Electronically signed by:  Jimmy Stock PT    Note: If patient does not return for scheduled/ recommended follow up visits, this note will serve as a discharge from care along with most recent update on progress.

## 2021-05-25 ENCOUNTER — HOSPITAL ENCOUNTER (OUTPATIENT)
Dept: PHYSICAL THERAPY | Age: 31
Setting detail: THERAPIES SERIES
Discharge: HOME | End: 2021-05-25
Payer: COMMERCIAL

## 2021-05-25 PROCEDURE — 97112 NEUROMUSCULAR REEDUCATION: CPT

## 2021-05-25 PROCEDURE — 97110 THERAPEUTIC EXERCISES: CPT

## 2021-05-25 NOTE — FLOWSHEET NOTE
The [address] [place] [address],[address], [address], [address] [place] [address]  Phone: [phone]   Fax:     [phone]    Physical Therapy Daily Treatment Note  Date:  2021    Patient Name:  Gary Mcleod    :  1990  MRN: [id number]  Restrictions/Precautions:    Medical/Treatment Diagnosis Information:  · Diagnosis: Z80.164R (ICD-10-CM) - Rupture of anterior cruciate ligament of right knee, initial encounter  · Treatment Diagnosis: M25.561 Right knee pain  Insurance/Certification information:  PT Insurance Information: [employer]  Physician Information:  Referring Practitioner: Juno Pride MD  Has the plan of care been signed (Y/N):        []  Yes  [x]  No     Date of Patient follow up with Physician:       Is this a Progress Report:     []  Yes  [x]  No        If Yes:  Date Range for reporting period:  Beginning 21  Ending    Progress report will be due (10 Rx or 30 days whichever is less): 31       Recertification will be due (POC Duration  / 90 days whichever is less): 21         Visit # Insurance Allowable Auth Required   4  4 of 12 visits [employer]  12 visits eval included end date 21 [x]  Yes []  No        Functional Scale: LEFS 89% deficit Date assessed:  21       Latex Allergy:  [x]NO      []YES  Preferred Language for Healthcare:   [x]English       []other:    Pain level:  0/10      SUBJECTIVE:   Pt reports she is continuing to do well. Concerned about mild gap at lateral aspect of TROM and knee. Pt states she is no longer taking pain medication and has weaned off Motrin as well. Not having as much swelling. Pull at posterior aspect of R knee when moving from knee flexion to extension during heel slide.      OBJECTIVE: See eval   Observation: Mild swelling R knee with bruising at posterior knee and anterior aspect of lower leg to medial aspect of R calf   ROM PROM AROM Overpressure Comment     L R  L R 5/19 L R     Flexion   106 heel slide 153 NT         Extension   0 0 0               RESTRICTIONS/PRECAUTIONS: S/P R ACL with BPTB graft, medial and lateral meniscus tear 5/12/21    Exercises/Interventions:   Exercise/Equipment Resistance/Repetitions Other comments   Stretching/AROM       Ankle pumps 5/19 cont with HEP    Calf stretch 30\"hx3 R 5/12 Long sitting   Hamstring  30\"hx3 R 5/25 Seated EOT, 1/2 roll under ankle    Heel slides 10\"hx10 R 5/12   SB roll ins 5\"h 3x10 bilat ^5/25 supine           Strengthening      SLR 1.5# 3x10 ^5/21   SLR abd 1.5#  3x10 ^5/21   SLR + 10\"hx5 R 5/25              Neuromuscular re-ed      Quad sets 10\"on:10\" off x10' with Biofeedback ^5/25   Adduction isometric Cont with HEP 5/19   TKE Green TB 3x10 R ^5/21 foot on floor with no weight through leg, bilateral axillary crutches                Quantum machines       Leg press        Leg extension       Leg curl               Manual interventions                                   Therapeutic Exercise and NMR EXR  [x] (16268) Provided verbal/tactile cueing for activities related to strengthening, flexibility, endurance, ROM for improvements in LE, proximal hip, and core control with self care, mobility, lifting, ambulation.  [] (81882) Provided verbal/tactile cueing for activities related to improving balance, coordination, kinesthetic sense, posture, motor skill, proprioception  to assist with LE, proximal hip, and core control in self care, mobility, lifting, ambulation and eccentric single leg control.      NMR and Therapeutic Activities:    [] (79093 or 31100) Provided verbal/tactile cueing for activities related to improving balance, coordination, kinesthetic sense, posture, motor skill, proprioception and motor activation to allow for proper function of core, proximal hip and LE with self care and ADLs  [] (02547) Gait Re-education- Provided training and instruction to the patient for proper LE, core and proximal hip in pain to facilitate improvement in movement, function, and ADLs as indicated by Functional Deficits. []? Progressing: []? Met: []? Not Met: []? Adjusted     Long Term Goals: To be achieved in: 16 weeks  1. Disability index score of 20% or less for the LEFS to assist with reaching prior level of function. []? Progressing: []? Met: []? Not Met: []? Adjusted  2. Patient will demonstrate increased R knee AROM that is equal to L to allow for proper joint functioning as indicated by patients Functional Deficits. []? Progressing: []? Met: []? Not Met: []? Adjusted  3. Patient will demonstrate an increase in R knee strength that is equal to L good proximal hip strength and control  in LE to allow for proper functional mobility as indicated by patients Functional Deficits. []? Progressing: []? Met: []? Not Met: []? Adjusted  4. Patient will return to all functional activities without increased symptoms or restriction. []? Progressing: []? Met: []? Not Met: []? Adjusted  5. Patient will complete Biodex Isokinetic strength with 25% or less deficit of quadriceps and hamsrting strength compared to L as well as PKTQ/BW of 40% or greater of quadriceps and 30% of hamstring on L in order to begin return to running progression  []? Progressing: []? Met: []? Not Met: []? Adjusted         Overall Progression Towards Functional goals/ Treatment Progress Update:  [] Patient is progressing as expected towards functional goals listed. [] Progression is slowed due to complexities/Impairments listed. [] Progression has been slowed due to co-morbidities.   [x] Plan just implemented, too soon to assess goals progression <30days   [] Goals require adjustment due to lack of progress  [] Patient is not progressing as expected and requires additional follow up with physician  [] Other    Prognosis for POC: [x] Good [] Fair  [] Poor      Patient requires continued skilled intervention: [x] Yes  [] No    Treatment/Activity Tolerance:  [x] Patient able to complete treatment  [] Patient limited by fatigue  [x] Patient limited by some pain     [] Patient limited by other medical complications  [x] Other: 5/25 Pt challenged with Biofeedback during quad set but responded well and demonstrated improved quad activation by end of exercise. Overall progressing well but does require cues to maintain knee extension during transitions from table to standing. Also required cues to not push through pain with knee flexion and stay in protected ROM. Patient Education:           5/25 Reviewed appropriate knee flexion ROM and proper fit of TROM. 5/19 Educated on precautions with knee flexion ROM and to not push through pain but work on gentle ROM. Educated on continued use of ice to decrease swelling and for pain control as she progresses off of pain medication   5/12 Educated on importance of ice/elevation to decrease swelling as well as proper stair negotiation with crutches and non weight bearing on R. Also reviewed importance of consistency with HEP. HEP instruction:   Access Code: Janie Chao: [email or web address]/  Date: 05/12/2021  Prepared by: Boubacar Hamlin         PLAN: See eval  [x] Continue per plan of care [] Calixto Haider current plan (see comments above)  [x] Plan of care initiated [] Hold pending MD visit [] Discharge  5/25 Progress with 25% WBing NPV based on MD f/u scheduled 5/26/21    Electronically signed by:  Rupesh Whitmore, PT    Note: If patient does not return for scheduled/ recommended follow up visits, this note will serve as a discharge from care along with most recent update on progress.

## 2021-05-26 ENCOUNTER — OFFICE VISIT (OUTPATIENT)
Dept: ORTHOPEDIC SURGERY | Age: 31
End: 2021-05-26

## 2021-05-26 VITALS — BODY MASS INDEX: 26.58 KG/M2 | TEMPERATURE: 97.5 F | WEIGHT: 150 LBS | HEIGHT: 63 IN

## 2021-05-26 DIAGNOSIS — Z98.890 S/P ACL RECONSTRUCTION: Primary | ICD-10-CM

## 2021-05-26 DIAGNOSIS — Z98.890 S/P MEDIAL MENISCUS REPAIR OF RIGHT KNEE: ICD-10-CM

## 2021-05-26 DIAGNOSIS — Z98.890 S/P LATERAL MENISCUS REPAIR OF RIGHT KNEE: ICD-10-CM

## 2021-05-26 PROCEDURE — 99024 POSTOP FOLLOW-UP VISIT: CPT | Performed by: ORTHOPAEDIC SURGERY

## 2021-05-26 NOTE — PROGRESS NOTES
Chief Complaint  Follow-up (right knee. s/p 2 weeks acl )      History of Present Illness:  Catracho Heath is a pleasant 27 y.o. female 2-week status post right knee arthroscopy, ACL reconstruction with patellar tendon autograft, medial meniscus repair, lateral meniscus repair on 5/11/2021. Pain is controlled on current regimen. She is maintaining her nonweightbearing status. She is working with formal supervised physical therapy on range of motion and quadricep strengthening. Denies any setbacks. Medical History:  Patient's medications, allergies, past medical, surgical, social and family histories were reviewed and updated as appropriate. Pain Assessment  Location of Pain: Knee  Location Modifiers: Right  Severity of Pain: 0  Quality of Pain: Aching  Duration of Pain: A few minutes  Frequency of Pain: Intermittent  Aggravating Factors:  (movement)  Limiting Behavior: Yes  Relieving Factors: Rest  Result of Injury: Yes  Work-Related Injury: No  Are there other pain locations you wish to document?: No  ROS: Review of systems reviewed from Patient History Form completed today and available in the patient's chart under the Media tab. Pertinent items are noted in HPI  Review of systems reviewed from Patient History Form completed today and available in the patient's chart under the Media tab. Vital Signs:  Temp 97.5 °F (36.4 °C)   Ht 5' 3\" (1.6 m)   Wt 150 lb (68 kg)   BMI 26.57 kg/m²       Right knee examination:     Gait: no use of assisted devices    Alignment: Alignment appreciated. Inspection/skin: Incisions healing well. No indication of infection. No dehiscence. Skin is intact without erythema or ecchymosis. No gross deformity. Palpation: No point tenderness. Nontender to light touch. Range of Motion: Able to flexion beyond 90 without significant discomfort. Strength: Able to perform straight leg raise. Mild quad weakness.     Effusion: Minimal effusion    Ligamentous stability: Solid endpoint with Lachman's. No gross instability. Patella tracking: Smooth translation of patella. Neurologic and vascular: Skin is warm and well-perfused. Distally neurovascularly intact. Additional findings: Calf soft nontender. Sensation is intact to light-touch. No pretibial edema. Left knee examination:    Gait: No use of assistive devices. No antalgic gait. Alignment: normal alignment. Inspection/skin: Skin is intact without erythema or ecchymosis. No gross deformity. Palpation: mild crepitus. no joint line tenderness present. Range of Motion: There is full range of motion. Strength: Normal quadriceps development. Effusion: No effusion or swelling present. Ligamentous stability: No cruciate or collateral ligament instability. Neurologic and vascular: Skin is warm and well-perfused. Sensation is intact to light-touch. Special tests: Negative Kristine sign. Radiology:       Pertinent imaging was interpreted and reviewed with the patient today, images only - no report available. No new imaging was obtained during today's visit. Assessment :  2 weeks status post right knee arthroscopy, ACL reconstruction of patellar tendon autograft, medial meniscus repair, lateral meniscus repair on 5/11/2021    Impression:  Encounter Diagnoses   Name Primary?  S/P ACL reconstruction Yes    S/P medial meniscus repair of right knee     S/P lateral meniscus repair of right knee        Office Procedures:  No orders of the defined types were placed in this encounter. Plan: Pertinent imaging was reviewed. The etiology, natural history, and treatment options for the disorder were discussed. The roles of activity medication, antiinflammatories, injections, bracing, physical therapy, and surgical interventions were all described to the patient and questions were answered. Patient is to continue working with formal supervised physical therapy.   She has a variant unstable lateral meniscus tear I would like her to maintain her nonweightbearing status for 1 month total at which point she is to increase her weight by 25% each week. Off crutches at 6 weeks. 1 month follow up  Genna Neal is in agreement with this plan. All questions were answered to patient's satisfaction and was encouraged to call with any further questions. Radha Finch, [facility]  5/26/2021    During this exam, I, Radha Finch PA-C, functioned as a scribe for Dr. Singh Hernandez. The history taking and physical examination were performed by Dr. Singh Hernandez. All counseling during the appointment was performed between the patient and Dr. Singh Hernandez. 5/26/2021 1:09 PM    This dictation was performed with a verbal recognition program (DRAGON) and it was checked for errors. It is possible that there are still dictated errors within this office note. If so, please bring any areas to my attention for an addendum. All efforts were made to ensure that this office note is accurate. I attest that I met personally with the patient, performed the described exam, reviewed the radiographic studies and medical records associated with this patient and supervised the services that are described above.      Cornelius Yeh MD

## 2021-05-27 ENCOUNTER — HOSPITAL ENCOUNTER (OUTPATIENT)
Dept: PHYSICAL THERAPY | Age: 31
Setting detail: THERAPIES SERIES
Discharge: HOME | End: 2021-05-27
Payer: COMMERCIAL

## 2021-05-27 PROCEDURE — 97110 THERAPEUTIC EXERCISES: CPT

## 2021-05-27 PROCEDURE — 97112 NEUROMUSCULAR REEDUCATION: CPT

## 2021-05-27 NOTE — FLOWSHEET NOTE
[facility], [address] [place] [address], [address] [place] [address]  Phone: [phone]   Fax:     [phone]    Physical Therapy Daily Treatment Note  Date:  2021    Patient Name:  Valeria Sharpe    :  1990  MRN: [id number]  Restrictions/Precautions:    Medical/Treatment Diagnosis Information:  · Diagnosis: N13.541R (ICD-10-CM) - Rupture of anterior cruciate ligament of right knee, initial encounter  · Treatment Diagnosis: M25.561 Right knee pain  Insurance/Certification information:  PT Insurance Information: [employer]  Physician Information:  Referring Practitioner: Candice Jiang MD  Has the plan of care been signed (Y/N):        [x]  Yes  []  No     Date of Patient follow up with Physician:       Is this a Progress Report:     []  Yes  [x]  No        If Yes:  Date Range for reporting period:  Beginning 21  Ending    Progress report will be due (10 Rx or 30 days whichever is less): 26       Recertification will be due (POC Duration  / 90 days whichever is less): 21         Visit # Insurance Allowable Auth Required   5  5 of 12 visits [place]  12 visits eval included end date 21 [x]  Yes []  No        Functional Scale: LEFS 89% deficit Date assessed:  21       Latex Allergy:  [x]NO      []YES  Preferred Language for Healthcare:   [x]English       []other:    Pain level:  0/10      SUBJECTIVE:   Pt states she went out with some co-workers yesterday night and was standing for about 1.5 hours with no issues while she was standing but increase in swelling when she got home. Did not sleep well d/t pain but was able to ice and elevate which decreased symptoms and go back to sleep. Symptoms have improved this morning.      OBJECTIVE: See eval   Observation: Moderate swelling R knee, signatures with sharpie marker around R knee incision noted 21  ROM PROM AROM Overpressure Comment     L R  L R  L R     Flexion   107 heel slide 153 NT         Extension   0 0 0               RESTRICTIONS/PRECAUTIONS: S/P R ACL with BPTB graft, medial and lateral meniscus tear 5/12/21    Exercises/Interventions:   Exercise/Equipment Resistance/Repetitions Other comments   Stretching/AROM       Ankle pumps 5/19 cont with HEP    Calf stretch 30\"hx3 R 5/12 Long sitting   Hamstring  30\"hx3 R 5/25 Seated EOT, 1/2 roll under ankle    Heel slides 10\"hx10 R 5/12   SB roll ins 5\"h 3x10 bilat ^5/25 supine           Strengthening      SLR 2# 3x10 ^5/27   SLR abd 2#  3x10 ^5/27   SLR + 20\"hx3 R ^5/27              Neuromuscular re-ed      Quad sets 10\"on:10\" off x10' with Biofeedback ^5/25   Adduction isometric Cont with HEP 5/19   TKE Blue TB 3x10 R ^5/27 foot on floor with no weight through leg, bilateral axillary crutches                Quantum machines       Leg press        Leg extension       Leg curl               Manual interventions                                   Therapeutic Exercise and NMR EXR  [x] (98751) Provided verbal/tactile cueing for activities related to strengthening, flexibility, endurance, ROM for improvements in LE, proximal hip, and core control with self care, mobility, lifting, ambulation.  [] (90429) Provided verbal/tactile cueing for activities related to improving balance, coordination, kinesthetic sense, posture, motor skill, proprioception  to assist with LE, proximal hip, and core control in self care, mobility, lifting, ambulation and eccentric single leg control.      NMR and Therapeutic Activities:    [] (68072 or 52590) Provided verbal/tactile cueing for activities related to improving balance, coordination, kinesthetic sense, posture, motor skill, proprioception and motor activation to allow for proper function of core, proximal hip and LE with self care and ADLs  [] (04518) Gait Re-education- Provided training and instruction to the patient for proper LE, core and proximal hip recruitment and decrease in pain to facilitate improvement in movement, function, and ADLs as indicated by Functional Deficits. []? Progressing: []? Met: []? Not Met: []? Adjusted     Long Term Goals: To be achieved in: 16 weeks  1. Disability index score of 20% or less for the LEFS to assist with reaching prior level of function. []? Progressing: []? Met: []? Not Met: []? Adjusted  2. Patient will demonstrate increased R knee AROM that is equal to L to allow for proper joint functioning as indicated by patients Functional Deficits. []? Progressing: []? Met: []? Not Met: []? Adjusted  3. Patient will demonstrate an increase in R knee strength that is equal to L good proximal hip strength and control  in LE to allow for proper functional mobility as indicated by patients Functional Deficits. []? Progressing: []? Met: []? Not Met: []? Adjusted  4. Patient will return to all functional activities without increased symptoms or restriction. []? Progressing: []? Met: []? Not Met: []? Adjusted  5. Patient will complete Biodex Isokinetic strength with 25% or less deficit of quadriceps and hamsrting strength compared to L as well as PKTQ/BW of 40% or greater of quadriceps and 30% of hamstring on L in order to begin return to running progression  []? Progressing: []? Met: []? Not Met: []? Adjusted         Overall Progression Towards Functional goals/ Treatment Progress Update:  [] Patient is progressing as expected towards functional goals listed. [] Progression is slowed due to complexities/Impairments listed. [] Progression has been slowed due to co-morbidities.   [x] Plan just implemented, too soon to assess goals progression <30days   [] Goals require adjustment due to lack of progress  [] Patient is not progressing as expected and requires additional follow up with physician  [] Other    Prognosis for POC: [x] Good [] Fair  [] Poor      Patient requires continued skilled intervention: [x] Yes  [] No    Treatment/Activity Tolerance:  [x] Patient able to complete treatment  [] Patient limited by fatigue  [x] Patient limited by some pain     [] Patient limited by other medical complications  [x] Other: 5/27 Challenged with Biofeedback initially possibly related to increase swelling. Muscle activation improved with reps and overall responded well to treatment with no adverse effects. Patient Education:           5/27 Updated HEP and reviewed precuations. 5/25 Reviewed appropriate knee flexion ROM and proper fit of TROM. 5/19 Educated on precautions with knee flexion ROM and to not push through pain but work on gentle ROM. Educated on continued use of ice to decrease swelling and for pain control as she progresses off of pain medication   5/12 Educated on importance of ice/elevation to decrease swelling as well as proper stair negotiation with crutches and non weight bearing on R. Also reviewed importance of consistency with HEP. HEP instruction:   Access Code: Ky Sanchez: [email or web address]/  Date: 05/12/2021  Prepared by: Consuelo Crocker         PLAN: See eval  [x] Continue per plan of care [] Alter current plan (see comments above)  [x] Plan of care initiated [] Hold pending MD visit [] Discharge  5/27 Pt to continue NWB based on MD orders. She is traveling next week and will continue HEP independently. Pt to f/u in 2 weeks when she returns from trip. Electronically signed by:  Melissa Sharp PT    Note: If patient does not return for scheduled/ recommended follow up visits, this note will serve as a discharge from care along with most recent update on progress.

## 2021-06-11 ENCOUNTER — HOSPITAL ENCOUNTER (OUTPATIENT)
Dept: PHYSICAL THERAPY | Age: 31
Setting detail: THERAPIES SERIES
Discharge: HOME | End: 2021-06-11
Payer: COMMERCIAL

## 2021-06-11 PROCEDURE — 97112 NEUROMUSCULAR REEDUCATION: CPT

## 2021-06-11 PROCEDURE — 97110 THERAPEUTIC EXERCISES: CPT

## 2021-06-11 NOTE — PROGRESS NOTES
Strength L R Comment   Quad 5 Mild medial quad atrophy     Hamstring 5 NT     Hip  flexion   4-     Hip abd   NT           Girth L R   Mid Patella 34 cm 36 cm     Gait: TROM locked at 0 deg extension, bilateral axillary crutches    Incisions: Incision closed with mild redness at distal incision but no active exudate or s/s of infection. RESTRICTIONS/PRECAUTIONS: S/P R ACL with BPTB graft, medial and lateral meniscus tear 5/12/21    Exercises/Interventions:   Exercise/Equipment Resistance/Repetitions Other comments   Stretching/AROM       Ankle pumps 5/19 cont with HEP    Calf stretch 30\"hx3 R 5/12 Long sitting   Hamstring  30\"hx3 R 5/25 Seated EOT, 1/2 roll under ankle    Heel slides 10\"hx10 R 5/12   SB roll ins 10\"x10 bilat ^6/11          Strengthening      SLR 3# 3x10 ^6/11   SLR abd 3#  3x10 ^6/11   SLR + 20\"hx3 R ^5/27              Neuromuscular re-ed      Quad sets 10\"on:10\" off x10' with Biofeedback ^5/25   Adduction isometric  5/19   TKE Blue TB 3x10 R ^5/27 foot on floor with no weight through leg, bilateral axillary crutches   Biodex weight bearing Completed for 4 minutes educating pt on 25% weight bearing based on MD protocol 6/11           Quantum machines       Leg press        Leg extension       Leg curl               Manual interventions                       6/11 Educated on proper gait sequencing with 25% weight bearing on R, TROM locked at 0 deg and bilateral axillary crutches.              Therapeutic Exercise and NMR EXR  [x] (80982) Provided verbal/tactile cueing for activities related to strengthening, flexibility, endurance, ROM for improvements in LE, proximal hip, and core control with self care, mobility, lifting, ambulation.  [] (31108) Provided verbal/tactile cueing for activities related to improving balance, coordination, kinesthetic sense, posture, motor skill, proprioception  to assist with LE, proximal hip, and core control in self care, mobility, lifting, ambulation and eccentric single leg control. NMR and Therapeutic Activities:    [] (71206 or 01783) Provided verbal/tactile cueing for activities related to improving balance, coordination, kinesthetic sense, posture, motor skill, proprioception and motor activation to allow for proper function of core, proximal hip and LE with self care and ADLs  [] (63514) Gait Re-education- Provided training and instruction to the patient for proper LE, core and proximal hip recruitment and positioning and eccentric body weight control with ambulation re-education including up and down stairs     Home Exercise Program:    [x] (60594) Reviewed/Progressed HEP activities related to strengthening, flexibility, endurance, ROM of core, proximal hip and LE for functional self-care, mobility, lifting and ambulation/stair navigation   [] (55671)Reviewed/Progressed HEP activities related to improving balance, coordination, kinesthetic sense, posture, motor skill, proprioception of core, proximal hip and LE for self care, mobility, lifting, and ambulation/stair navigation      Manual Treatments:  PROM / STM / Oscillations-Mobs:  G-I, II, III, IV (PA's, Inf., Post.)  [] (48885) Provided manual therapy to mobilize LE, proximal hip and/or LS spine soft tissue/joints for the purpose of modulating pain, promoting relaxation,  increasing ROM, reducing/eliminating soft tissue swelling/inflammation/restriction, improving soft tissue extensibility and allowing for proper ROM for normal function with self care, mobility, lifting and ambulation.      Modalities:  CP declined     Charges:  Timed Code Treatment Minutes:  52''   Total Treatment Minutes: 5:10-9:81  94'       [] EVAL (LOW) 10845 (typically 20 minutes face-to-face)  [] EVAL (MOD) 01533 (typically 30 minutes face-to-face)  [] EVAL (HIGH) 24205 (typically 45 minutes face-to-face)  [] RE-EVAL   [x] EZ(66400) x 2    [] IONTO  [x] NMR (04398) x 1    [] VASO  [] Manual (63942) x      [] Other:  [] TA x appropriate based on MD orders. [] Progression is slowed due to complexities/Impairments listed. [] Progression has been slowed due to co-morbidities. [] Plan just implemented, too soon to assess goals progression <30days   [] Goals require adjustment due to lack of progress  [] Patient is not progressing as expected and requires additional follow up with physician  [] Other    Prognosis for POC: [x] Good [] Fair  [] Poor      Patient requires continued skilled intervention: [x] Yes  [] No    Treatment/Activity Tolerance:  [x] Patient able to complete treatment  [] Patient limited by fatigue  [] Patient limited by some pain     [] Patient limited by other medical complications  [x] Other: 6/11 Pt demonstrates improved quad activation and based on MD protocol is appropriate to transition to 25% weight bearing. She was hesitant with putting weight through R foot during gait but demonstrated appropriate sequencing and no c/o pain. Patient Education:           6/11 Educated on 25% weight bearing and proper sequencing during gait. 5/27 Updated HEP and reviewed precuations. 5/25 Reviewed appropriate knee flexion ROM and proper fit of TROM. 5/19 Educated on precautions with knee flexion ROM and to not push through pain but work on gentle ROM. Educated on continued use of ice to decrease swelling and for pain control as she progresses off of pain medication   5/12 Educated on importance of ice/elevation to decrease swelling as well as proper stair negotiation with crutches and non weight bearing on R. Also reviewed importance of consistency with HEP. HEP instruction:   Access Code: Eusebia De Paz: [email or web address] com/  Date: 05/12/2021  Prepared by: Niles Leach         PLAN: See eval  [x] Continue per plan of care [] Alter current plan (see comments above)  [x] Plan of care initiated [] Hold pending MD visit [] Discharge  6/11 Continue to push quad strength and ROM as tolerated based on MD protocol     Electronically signed by:  Leon Rivera PT    Note: If patient does not return for scheduled/ recommended follow up visits, this note will serve as a discharge from care along with most recent update on progress.

## 2021-06-14 ENCOUNTER — TELEPHONE (OUTPATIENT)
Dept: ORTHOPEDIC SURGERY | Age: 31
End: 2021-06-14

## 2021-06-15 ENCOUNTER — OFFICE VISIT (OUTPATIENT)
Dept: ORTHOPEDIC SURGERY | Age: 31
End: 2021-06-15

## 2021-06-15 ENCOUNTER — HOSPITAL ENCOUNTER (OUTPATIENT)
Dept: PHYSICAL THERAPY | Age: 31
Setting detail: THERAPIES SERIES
Discharge: HOME | End: 2021-06-15
Payer: COMMERCIAL

## 2021-06-15 VITALS — WEIGHT: 150 LBS | TEMPERATURE: 98 F | HEIGHT: 63 IN | BODY MASS INDEX: 26.58 KG/M2

## 2021-06-15 DIAGNOSIS — Z98.890 S/P LATERAL MENISCUS REPAIR OF RIGHT KNEE: ICD-10-CM

## 2021-06-15 DIAGNOSIS — Z98.890 S/P ACL RECONSTRUCTION: Primary | ICD-10-CM

## 2021-06-15 DIAGNOSIS — Z98.890 S/P MEDIAL MENISCUS REPAIR OF RIGHT KNEE: ICD-10-CM

## 2021-06-15 PROCEDURE — 97110 THERAPEUTIC EXERCISES: CPT

## 2021-06-15 PROCEDURE — 97112 NEUROMUSCULAR REEDUCATION: CPT

## 2021-06-15 PROCEDURE — 97530 THERAPEUTIC ACTIVITIES: CPT

## 2021-06-15 PROCEDURE — 99024 POSTOP FOLLOW-UP VISIT: CPT | Performed by: ORTHOPAEDIC SURGERY

## 2021-06-15 RX ORDER — SULFAMETHOXAZOLE AND TRIMETHOPRIM 800; 160 MG/1; MG/1
1 TABLET ORAL 2 TIMES DAILY
Qty: 20 TABLET | Refills: 0 | Status: SHIPPED | OUTPATIENT
Start: 2021-06-15 | End: 2021-06-25

## 2021-06-15 NOTE — PROGRESS NOTES
Chief Complaint  Follow-up (right knee. s/p 5 weeks acl reconstruction )      History of Present Illness:  Arabella Bean is a pleasant 32 y.o. female who presents today for follow up evaluation of right knee pain. She is 5 weeks status post right knee arthroscopy, ACL reconstruction with patellar tendon autograft, medial meniscus repair, lateral meniscus repair on 5/11/2021. She has been compliant with post operative physical therapy. She has progressed to 25% weightbearing and continues to use the brace. She is doing well but noticed an area of redness and pus filled nodule on the distal end of her incision. She states it is in the area where a strap of her brace rubs. She has been using triple antibiotic ointment and it has improved somewhat. Denies fevers or chills. No new injuries reported. Medical History:  Patient's medications, allergies, past medical, surgical, social and family histories were reviewed and updated as appropriate. Pertinent items are noted in HPI  Review of systems reviewed from Patient History Form completed today and available in the patient's chart under the Media tab. Pain Assessment  Location of Pain: Knee  Location Modifiers: Right  Quality of Pain:  (n/a)  Duration of Pain:  (n/a)  Frequency of Pain: Intermittent  Aggravating Factors:  (no aggravating factors)  Limiting Behavior: Yes  Relieving Factors: Rest  Result of Injury: Yes  Work-Related Injury: No  Are there other pain locations you wish to document?: No    Past Medical History:   Diagnosis Date    Uncomplicated asthma         Past Surgical History:   Procedure Laterality Date    ANTERIOR CRUCIATE LIGAMENT REPAIR Right 5/11/2021    RIGHT KNEE ARTHROSOCPY, ANTERIOR CRUCIATE LIGAMENT RECONSTRUCTION PATELLA TENDON, MEDIAL AND LATERAL MENISCUS REPAIR performed by Dianelys Evans MD at [address] [facility] Right     orf right wrist        History reviewed. No pertinent family history.     Social History facility-administered medications for this visit. Allergies   Allergen Reactions    Amoxicillin      Hives    Hydrocodone      Facial rash       Vital signs:  Temp 98 °F (36.7 °C)   Ht 5' 3\" (1.6 m)   Wt 150 lb (68 kg)   BMI 26.57 kg/m²              RIGHT Knee Exam:     Gait: Ambulating with crutches. Alignment: Alignment appreciated.      Inspection/skin: errythemitis distal end of incision, non purulent, nondehisced, nonulcerating, no suture appreciated      Palpation: No point tenderness. Nontender to light touch.     Range of Motion: Able to flexion beyond 90 without significant discomfort.     Strength: Able to perform straight leg raise. Mild quad weakness.     Effusion: Minimal effusion     Ligamentous stability: Solid endpoint with Lachman's. No gross instability.     Patella tracking: Smooth translation of patella.      Neurologic and vascular: Skin is warm and well-perfused. Distally neurovascularly intact.     Additional findings: Calf soft nontender. Sensation is intact to light-touch. No pretibial edema               Assessment :  5 weeks status post right knee arthroscopy, ACL reconstruction of patellar tendon autograft, medial meniscus repair, lateral meniscus repair on 5/11/2021    Impression:  Encounter Diagnoses   Name Primary?  S/P ACL reconstruction Yes    S/P medial meniscus repair of right knee     S/P lateral meniscus repair of right knee        Office Procedures:  No orders of the defined types were placed in this encounter. Plan: Pertinent imaging was reviewed. The etiology, natural history, and treatment options for the disorder were discussed. The roles of activity medication, antiinflammatories, injections, bracing, physical therapy, and surgical interventions were all described to the patient and questions were answered. We will treat her with a prescription of Bactrim for what I believe to be a superficial stitch abscess.  She can discontinue the brace but continue to ambulate 25% weightbearing with crutches. She is able to continue post operative physical therapy. We will call her tomorrow for an update. I will see her back next week for reevaluation, sooner if needed. Naomi Ho is in agreement with this plan. All questions were answered to patient's satisfaction and was encouraged to call with any further questions. I, Yasmin Stephen ATC, am scribing for and in the presence of Dr. Redgie Apgar. 06/15/21 10:14 AM Yasmin Stephen ATC    I attest that I met personally with the patient, performed the described exam, reviewed the radiographic studies and medical records associated with this patient and supervised the services that are described above.      Nate Dailey MD

## 2021-06-15 NOTE — FLOWSHEET NOTE
in self care, mobility, lifting, ambulation and eccentric single leg control. NMR and Therapeutic Activities:    [] (36771 or 41025) Provided verbal/tactile cueing for activities related to improving balance, coordination, kinesthetic sense, posture, motor skill, proprioception and motor activation to allow for proper function of core, proximal hip and LE with self care and ADLs  [] (54279) Gait Re-education- Provided training and instruction to the patient for proper LE, core and proximal hip recruitment and positioning and eccentric body weight control with ambulation re-education including up and down stairs     Home Exercise Program:    [x] (72644) Reviewed/Progressed HEP activities related to strengthening, flexibility, endurance, ROM of core, proximal hip and LE for functional self-care, mobility, lifting and ambulation/stair navigation   [] (91168)Reviewed/Progressed HEP activities related to improving balance, coordination, kinesthetic sense, posture, motor skill, proprioception of core, proximal hip and LE for self care, mobility, lifting, and ambulation/stair navigation      Manual Treatments:  PROM / STM / Oscillations-Mobs:  G-I, II, III, IV (PA's, Inf., Post.)  [] (32342) Provided manual therapy to mobilize LE, proximal hip and/or LS spine soft tissue/joints for the purpose of modulating pain, promoting relaxation,  increasing ROM, reducing/eliminating soft tissue swelling/inflammation/restriction, improving soft tissue extensibility and allowing for proper ROM for normal function with self care, mobility, lifting and ambulation.      Modalities:  CP declined     Charges:  Timed Code Treatment Minutes:  48'   Total Treatment Minutes: 10:15-11:18  61'       [] EVAL (LOW) 14949 (typically 20 minutes face-to-face)  [] EVAL (MOD) 22904 (typically 30 minutes face-to-face)  [] EVAL (HIGH) 45919 (typically 45 minutes face-to-face)  [] RE-EVAL   [x] CX(35442) x 2    [] IONTO  [x] NMR (90337) x 1    [] VASO  [] Manual (79916) x      [] Other:  [x] TA x 1     [] Mech Traction (04256)  [] ES(attended) (34899)      [] ES (un) (60663):     GOALS:   Patient stated goal: Return to [employer]    [x]? Progressing: []? Met: []? Not Met: []? Adjusted     Therapist goals for Patient:   Short Term Goals: To be achieved in: 8 weeks  1. Independent in HEP and progression per patient tolerance, in order to prevent re-injury. [x]? Progressing: []? Met: []? Not Met: []? Adjusted   2. Patient will have a decrease in pain to facilitate improvement in movement, function, and ADLs as indicated by Functional Deficits. [x]? Progressing: []? Met: []? Not Met: []? Adjusted     Long Term Goals: To be achieved in: 16 weeks  1. Disability index score of 20% or less for the LEFS to assist with reaching prior level of function. [x]? Progressing: []? Met: []? Not Met: []? Adjusted  2. Patient will demonstrate increased R knee AROM that is equal to L to allow for proper joint functioning as indicated by patients Functional Deficits. [x]? Progressing: []? Met: []? Not Met: []? Adjusted  3. Patient will demonstrate an increase in R knee strength that is equal to L good proximal hip strength and control  in LE to allow for proper functional mobility as indicated by patients Functional Deficits. [x]? Progressing: []? Met: []? Not Met: []? Adjusted  4. Patient will return to all functional activities without increased symptoms or restriction. [x]? Progressing: []? Met: []? Not Met: []? Adjusted  5. Patient will complete Biodex Isokinetic strength with 25% or less deficit of quadriceps and hamsrting strength compared to L as well as PKTQ/BW of 40% or greater of quadriceps and 30% of hamstring on L in order to begin return to running progression  [x]? Progressing: []? Met: []? Not Met: []?  Adjusted         Overall Progression Towards Functional goals/ Treatment Progress Update:  [x] Patient is progressing as expected towards functional goals listed with increase strength and ROM that is appropriate based on MD orders. [] Progression is slowed due to complexities/Impairments listed. [] Progression has been slowed due to co-morbidities. [] Plan just implemented, too soon to assess goals progression <30days   [] Goals require adjustment due to lack of progress  [] Patient is not progressing as expected and requires additional follow up with physician  [] Other    Prognosis for POC: [x] Good [] Fair  [] Poor      Patient requires continued skilled intervention: [x] Yes  [] No    Treatment/Activity Tolerance:  [x] Patient able to complete treatment  [] Patient limited by fatigue  [] Patient limited by some pain     [] Patient limited by other medical complications  [x] Other: 6/15 Pt continues to have redness/ irritation at distal incision but no active exudate noted and placed on medication by MD. Responded well to increase in weight bearing but still is very protective of R LE in standing and with gait. Required cues for proper gait sequencing especially during swing phase that improved with repetition. .       Patient Education:           6/11 Educated on 25% weight bearing and proper sequencing during gait. 5/27 Updated HEP and reviewed precuations. 5/25 Reviewed appropriate knee flexion ROM and proper fit of TROM. 5/19 Educated on precautions with knee flexion ROM and to not push through pain but work on gentle ROM. Educated on continued use of ice to decrease swelling and for pain control as she progresses off of pain medication   5/12 Educated on importance of ice/elevation to decrease swelling as well as proper stair negotiation with crutches and non weight bearing on R. Also reviewed importance of consistency with HEP. HEP instruction:   Access Code: Yulisa Luther: [email or web address] com/  Date: 05/12/2021  Prepared by: Luis Yadav         PLAN: See eval  [x] Continue per plan of care [] Alter current plan (see comments

## 2021-06-17 ENCOUNTER — APPOINTMENT (OUTPATIENT)
Dept: PHYSICAL THERAPY | Age: 31
End: 2021-06-17
Payer: COMMERCIAL

## 2021-06-23 ENCOUNTER — HOSPITAL ENCOUNTER (OUTPATIENT)
Dept: PHYSICAL THERAPY | Age: 31
Setting detail: THERAPIES SERIES
Discharge: HOME | End: 2021-06-23
Payer: COMMERCIAL

## 2021-06-23 ENCOUNTER — OFFICE VISIT (OUTPATIENT)
Dept: ORTHOPEDIC SURGERY | Age: 31
End: 2021-06-23

## 2021-06-23 VITALS — BODY MASS INDEX: 26.58 KG/M2 | HEIGHT: 63 IN | TEMPERATURE: 97.6 F | WEIGHT: 150 LBS

## 2021-06-23 DIAGNOSIS — Z98.890 S/P MEDIAL MENISCUS REPAIR OF RIGHT KNEE: ICD-10-CM

## 2021-06-23 DIAGNOSIS — Z98.890 S/P LATERAL MENISCUS REPAIR OF RIGHT KNEE: ICD-10-CM

## 2021-06-23 DIAGNOSIS — Z98.890 S/P ACL RECONSTRUCTION: Primary | ICD-10-CM

## 2021-06-23 PROCEDURE — 97112 NEUROMUSCULAR REEDUCATION: CPT

## 2021-06-23 PROCEDURE — 97110 THERAPEUTIC EXERCISES: CPT

## 2021-06-23 PROCEDURE — 97530 THERAPEUTIC ACTIVITIES: CPT

## 2021-06-23 PROCEDURE — 99024 POSTOP FOLLOW-UP VISIT: CPT | Performed by: ORTHOPAEDIC SURGERY

## 2021-06-23 RX ORDER — METHYLPREDNISOLONE 4 MG/1
TABLET ORAL
Qty: 1 KIT | Refills: 0 | Status: SHIPPED | OUTPATIENT
Start: 2021-06-23 | End: 2021-06-29

## 2021-06-23 NOTE — PROGRESS NOTES
Chief Complaint  Follow-up (right knee. s/p 6 months acl )      History of Present Illness:  Stacy Cárdeans is a pleasant 32 y.o. female who presents today for follow-up evaluation of her right knee. She is 6 weeks status post right knee arthroscopy, ACL reconstruction with patellar tendon autograft, medial meniscal repair, lateral meniscus repair on 5/11/2021. She states that she has been progressed to 50% weightbearing. She did have a suture abscess and was placed on Bactrim for which she is on day 8 from. States she has not had any drainage from the knee today. She does experience significant patellar squeaking which is pain-free but she is concerned as it is very loud. States she is getting 124 degrees of flexion with therapy. Medical History:  Patient's medications, allergies, past medical, surgical, social and family histories were reviewed and updated as appropriate. Pertinent items are noted in HPI  Review of systems reviewed from Patient History Form  and available in the patient's chart under the Media tab. Vital Signs:  Vitals:    06/23/21 1344   Temp: 97.6 °F (36.4 °C)         Neuro: Alert & oriented x 3,  normal,  no focal deficits noted. Normal affect. Eyes: sclera clear  Ears: Normal external ear  Mouth:  No perioral lesions  Pulm: Respirations unlabored and regular  Pulse: Regular rate and rhythm   Skin: Warm, well perfused      Constitutional: In no apparent distress. Normal affect. Alert and oriented X3 and is cooperative. Right knee exam    Gait: Crutch assisted gait    Alignment: normal alignment. Inspection/skin: Skin is intact without erythema or ecchymosis. No gross deformity. Patient does have a small opening of the distal aspect of her incision, no fluid could be expressed today. Minimal redness noted    Palpation: Patient does have patellofemoral crepitus and a loud squeak with flexion. No joint line tenderness present.     Range of Motion: There is full range of motion. Strength: Normal quadriceps development. Effusion: No effusion or swelling present. Ligamentous stability: No cruciate or collateral ligament instability. Neurologic and vascular: Skin is warm and well-perfused. Sensation is intact to light-touch. Special tests: Negative Kristine sign. Left knee exam    Gait: No use of assistive devices. No antalgic gait. Alignment: normal alignment. Inspection/skin: Skin is intact without erythema or ecchymosis. No gross deformity. Palpation: no crepitus. no joint line tenderness present. Range of Motion: There is full range of motion. Strength: Normal quadriceps development. Effusion: No effusion or swelling present. Ligamentous stability: No cruciate or collateral ligament instability. Neurologic and vascular: Skin is warm and well-perfused. Sensation is intact to light-touch. Special tests: Negative Kristine sign. Radiology:     No new imaging    Assessment : Status post right knee arthroscopy, ACL reconstruction with BTB autograft, medial lateral meniscus repairs on 5/11/2021    Impression:  Encounter Diagnoses   Name Primary?  S/P ACL reconstruction Yes    S/P medial meniscus repair of right knee     S/P lateral meniscus repair of right knee        Office Procedures:  No orders of the defined types were placed in this encounter. Plan: Discussed with patient we would like her to return into her brace locked in full extension for the next week. We did order her a Medrol Dosepak for which we would like her to take. We would like her to maintain her 50% weightbearing. Encouraged her to continue straight leg raises to maintain her quadriceps strength. Would like to see her back in 1 week for repeat clinical exam.     Apollo Adkins is in agreement with this plan. All questions were answered to patient's satisfaction and was encouraged to call with any further questions.     Total time spent with diagnosis, treatment plan and patient education: 20 minutes    [id number] [facility] fellow  [address]    I attest that I met personally with the patient, performed the described exam, reviewed the radiographic studies and medical records associated with this patient and supervised the services that are described above.      Rosalie Obrien MD

## 2021-06-23 NOTE — FLOWSHEET NOTE
[facility], [address] [place] [address], [address] [place] [address]  Phone: [phone]   Fax:     [phone]    Physical Therapy Daily Treatment Note  Date:  2021    Patient Name:  Malka Kaminski    :  1990  MRN: [id number]  Restrictions/Precautions:    Medical/Treatment Diagnosis Information:  · Diagnosis: C18.569Z (ICD-10-CM) - Rupture of anterior cruciate ligament of right knee, initial encounter  · Treatment Diagnosis: M25.561 Right knee pain  Insurance/Certification information:  PT Insurance Information: [place]  Physician Information:  Referring Practitioner: Curt Mcbride MD  Has the plan of care been signed (Y/N):        [x]  Yes  []  No     Date of Patient follow up with Physician:       Is this a Progress Report:     []  Yes  [x]  No        If Yes:  Date Range for reporting period:  Beginning 21  Ending 21    Progress report will be due (10 Rx or 30 days whichever is less): 76      Recertification will be due (POC Duration  / 90 days whichever is less): 21         Visit # Insurance Allowable Auth Required   9  9 of 12 visits [place]  12 visits eval included end date 21 [x]  Yes []  No        Functional Scale:   LEFS 65% deficit Date assessed: 21  LEFS 89% deficit Date assessed:  21       Latex Allergy:  [x]NO      []YES  Preferred Language for Healthcare:   [x]English       []other:    Pain level:  0/10      SUBJECTIVE:  : Pt states she has experienced mild increase in swelling in R LE with increase in weight bearing but no increase in pain reported. Mild puss at distal incision but overall feels that is it is improving. Reports she has not been icing it as much but has been spending more time on her feet.  Reports her knee cap is squeaking    OBJECTIVE: Updated below on 21  ROM PROM AROM Overpressure Comment     L R  L R  L R     Flexion   124 heel slide 153 NT       Extension   0 0 0          Strength L R Comment   Quad 5 Mild medial quad atrophy     Hamstring 5 NT     Hip  flexion   4-     Hip abd   NT           Girth L R   Mid Patella 34 cm 36 cm     Gait: TROM locked at 0 deg extension, bilateral axillary crutches    Incisions: Incision closed with mild redness at distal incision but no active exudate or s/s of infection. RESTRICTIONS/PRECAUTIONS: S/P R ACL with BPTB graft, medial and lateral meniscus tear 5/12/21    Exercises/Interventions:   Exercise/Equipment Resistance/Repetitions Other comments   Stretching/AROM       Ankle pumps 5/19 cont with HEP    Bike x5' AAROM rocking 6/23   Calf stretch 30\"hx3 R ^6/15 slant board with protected weight bearing, bilateral axillary crutches.     Hamstring  30\"hx3 R 5/25 Seated EOT, 1/2 roll under ankle    Heel slides 10\"hx10 R 5/12   SB roll ins 10\"x10 bilat ^6/11   Heel raises 3x10 bilat 6/15 at table with bilateral UE support   Strengthening      SLR 5# 3x10 R ^6/23   SLR abd 5#  3x10 R ^6/23   SLR add 0# 3x10 R 6/23   SLR + 30\"hx5 R ^6/23              Neuromuscular re-ed      Quad sets 10\"on:10\" off x10' with Biofeedback ^5/25   Adduction isometric  5/19   TKE Lal TB 3x10 R ^6/15 partial weight bearing bilateral axillary crutches   Biodex weight bearing ^6/15    Cone walking 6/15 cues for proper knee flexion during swing phase and quad activation during stance phase    Quantum machines       Leg press        Leg extension       Leg curl               Manual interventions                Mario  R patella to imprveo latearl tilt and glide d/t VMO atrophy 6/23                Therapeutic Exercise and NMR EXR  [x] (22616) Provided verbal/tactile cueing for activities related to strengthening, flexibility, endurance, ROM for improvements in LE, proximal hip, and core control with self care, mobility, lifting, ambulation.  [] (61914) Provided verbal/tactile cueing for activities related to improving balance, coordination, kinesthetic sense, posture, motor skill, proprioception  to assist with LE, proximal hip, and core control in self care, mobility, lifting, ambulation and eccentric single leg control. NMR and Therapeutic Activities:    [] (61347 or 79929) Provided verbal/tactile cueing for activities related to improving balance, coordination, kinesthetic sense, posture, motor skill, proprioception and motor activation to allow for proper function of core, proximal hip and LE with self care and ADLs  [] (51919) Gait Re-education- Provided training and instruction to the patient for proper LE, core and proximal hip recruitment and positioning and eccentric body weight control with ambulation re-education including up and down stairs     Home Exercise Program:    [x] (07207) Reviewed/Progressed HEP activities related to strengthening, flexibility, endurance, ROM of core, proximal hip and LE for functional self-care, mobility, lifting and ambulation/stair navigation   [] (42200)Reviewed/Progressed HEP activities related to improving balance, coordination, kinesthetic sense, posture, motor skill, proprioception of core, proximal hip and LE for self care, mobility, lifting, and ambulation/stair navigation      Manual Treatments:  PROM / STM / Oscillations-Mobs:  G-I, II, III, IV (PA's, Inf., Post.)  [] (27794) Provided manual therapy to mobilize LE, proximal hip and/or LS spine soft tissue/joints for the purpose of modulating pain, promoting relaxation,  increasing ROM, reducing/eliminating soft tissue swelling/inflammation/restriction, improving soft tissue extensibility and allowing for proper ROM for normal function with self care, mobility, lifting and ambulation.      Modalities:  CP declined d/t MD appointment     Charges:  Timed Code Treatment Minutes:  48'   Total Treatment Minutes: 1:01-2:14  68'       [] EVAL (LOW) 455 [id number] (typically 20 minutes face-to-face)  [] EVAL (MOD) 80016 (typically 30 minutes face-to-face)  [] EVAL weight bearing on R. Also reviewed importance of consistency with HEP. HEP instruction:   Access Code: Ced Horton: [email or web address]  Date: 05/12/2021  Prepared by: Fiordaliza Niño         PLAN: See eval  [x] Continue per plan of care [] Aren Avalos current plan (see comments above)  [x] Plan of care initiated [] Hold pending MD visit [] Discharge  6/23 Continue to increase weight bearing without brace to 75% on R, push quad strength and ROM as tolerated based on MD protocol     Electronically signed by:  Uma Pozo PT    Note: If patient does not return for scheduled/ recommended follow up visits, this note will serve as a discharge from care along with most recent update on progress.

## 2021-06-29 ENCOUNTER — HOSPITAL ENCOUNTER (OUTPATIENT)
Dept: PHYSICAL THERAPY | Age: 31
Setting detail: THERAPIES SERIES
Discharge: HOME | End: 2021-06-29
Payer: COMMERCIAL

## 2021-06-29 ENCOUNTER — OFFICE VISIT (OUTPATIENT)
Dept: ORTHOPEDIC SURGERY | Age: 31
End: 2021-06-29
Payer: COMMERCIAL

## 2021-06-29 VITALS — WEIGHT: 150 LBS | BODY MASS INDEX: 26.58 KG/M2 | HEIGHT: 63 IN

## 2021-06-29 DIAGNOSIS — Z98.890 S/P LATERAL MENISCUS REPAIR OF RIGHT KNEE: ICD-10-CM

## 2021-06-29 DIAGNOSIS — Z98.890 S/P MEDIAL MENISCUS REPAIR OF RIGHT KNEE: ICD-10-CM

## 2021-06-29 DIAGNOSIS — Z98.890 S/P ACL RECONSTRUCTION: Primary | ICD-10-CM

## 2021-06-29 PROCEDURE — L1812 KO ELASTIC W/JOINTS PRE OTS: HCPCS | Performed by: ORTHOPAEDIC SURGERY

## 2021-06-29 PROCEDURE — 97112 NEUROMUSCULAR REEDUCATION: CPT

## 2021-06-29 PROCEDURE — 97110 THERAPEUTIC EXERCISES: CPT

## 2021-06-29 PROCEDURE — 97530 THERAPEUTIC ACTIVITIES: CPT

## 2021-06-29 PROCEDURE — 99024 POSTOP FOLLOW-UP VISIT: CPT | Performed by: ORTHOPAEDIC SURGERY

## 2021-06-29 NOTE — PROGRESS NOTES
Chief Complaint  Follow-up (right knee. s/p 7 weeks acl )      History of Present Illness:  Zafar Will is a pleasant 32 y.o. female who presents today for follow up evaluation of right knee pain. She is 7 weeks status post right knee arthroscopy, ACL reconstruction with patellar tendon autograft, medial meniscal repair, lateral meniscus repair on 5/11/2021. She has been compliant with post operative physical therapy and has been progressed to 50% weightbearing. She did have a suture abscess and was placed on Bactrim which she has completed and shows no signs of infection. She does however experience significant patellar squeaking which is pain-free but she is concerned as it is very loud. No new injuries reported. Medical History:  Patient's medications, allergies, past medical, surgical, social and family histories were reviewed and updated as appropriate. Pertinent items are noted in HPI  Review of systems reviewed from Patient History Form completed today and available in the patient's chart under the Media tab. Pain Assessment  Location of Pain: Knee  Location Modifiers: Right  Severity of Pain: 0  Quality of Pain: Dull  Duration of Pain: A few minutes  Frequency of Pain: Intermittent  Aggravating Factors: Bending  Limiting Behavior: Yes  Relieving Factors: Rest  Result of Injury: Yes  Work-Related Injury: No  Are there other pain locations you wish to document?: No    Past Medical History:   Diagnosis Date    Uncomplicated asthma         Past Surgical History:   Procedure Laterality Date    ANTERIOR CRUCIATE LIGAMENT REPAIR Right 5/11/2021    RIGHT KNEE ARTHROSOCPY, ANTERIOR CRUCIATE LIGAMENT RECONSTRUCTION PATELLA TENDON, MEDIAL AND LATERAL MENISCUS REPAIR performed by Rob Santoyo MD at [address] Right     orf right wrist        History reviewed. No pertinent family history.     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[place] [address] Rd Use    Smoking status: Never Smoker    Smokeless tobacco: Never Used   Vaping Use    Vaping Use: Never used   Substance and Sexual Activity    Alcohol use: Yes     Alcohol/week: 1.0 standard drinks     Types: 1 Glasses of wine per week     Comment: 2-3 a week     Drug use: Not Currently    Sexual activity: None   Other Topics Concern    None   Social History Narrative    None     Social Determinants of Health     Financial Resource Strain:     Difficulty of Paying Living Expenses:    Food Insecurity:     Worried About Running Out of Food in the Last Year:     Ran Out of Food in the Last Year:    Transportation Needs:     Lack of Transportation (Medical):  Lack of Transportation (Non-Medical):    Physical Activity:     Days of Exercise per Week:     Minutes of Exercise per Session:    Stress:     Feeling of Stress :    Social Connections:     Frequency of Communication with Friends and Family:     Frequency of Social Gatherings with Friends and Family:     Attends Jain Services:     Active Member of Clubs or Organizations:     Attends Club or Organization Meetings:     Marital Status:    Intimate Partner Violence:     Fear of Current or Ex-Partner:     Emotionally Abused:     Physically Abused:     Sexually Abused:        Current Outpatient Medications   Medication Sig Dispense Refill    methylPREDNISolone (MEDROL DOSEPACK) 4 MG tablet Take by mouth.  1 kit 0    ondansetron (ZOFRAN) 4 MG tablet Take 1 tablet by mouth every 8 hours as needed for Nausea or Vomiting 30 tablet 0    senna-docusate (PERICOLACE) 8.6-50 MG per tablet Take 2 tablets by mouth daily as needed for Constipation 30 tablet 0    aspirin 325 MG EC tablet Take 1 tablet by mouth daily for 21 days 21 tablet 0     No current facility-administered medications for this visit. Allergies   Allergen Reactions    Amoxicillin      Hives    Hydrocodone      Facial rash       Vital signs:  Ht 5' 3\" (1.6 m)   Wt 150 lb (68 kg)   BMI 26.57 kg/m²              Right knee exam     Gait: Crutch assisted gait     Alignment: normal alignment.     Inspection/skin: Skin is intact without erythema or ecchymosis. No gross deformity. Healed surgical incisions.     Palpation: Patient does have patellofemoral crepitus and a loud squeak with flexion. No joint line tenderness present.     Range of Motion: Able to flex past 90 degrees.      Strength: Quadriceps weakness.      Effusion: No effusion or swelling present.      Ligamentous stability: No cruciate or collateral ligament instability.      Neurologic and vascular: Skin is warm and well-perfused. Sensation is intact to light-touch.      Special tests: Negative Kristine sign.         Left knee exam     Gait: No use of assistive devices. No antalgic gait.     Alignment: normal alignment.     Inspection/skin: Skin is intact without erythema or ecchymosis. No gross deformity.      Palpation: no crepitus. no joint line tenderness present.     Range of Motion: There is full range of motion.      Strength: Normal quadriceps development.      Effusion: No effusion or swelling present.      Ligamentous stability: No cruciate or collateral ligament instability.      Neurologic and vascular: Skin is warm and well-perfused. Sensation is intact to light-touch.      Special tests: Negative Kristine sign. Radiology:     Pertinent imaging was interpreted and reviewed with the patient. No new imaging was obtained during today's visit. Assessment :  7 weeks Status post right knee arthroscopy, ACL reconstruction with BTB autograft, medial lateral meniscus repairs on 5/11/2021    Impression:  Encounter Diagnoses   Name Primary?     S/P ACL reconstruction Yes    S/P medial meniscus repair of right knee     S/P lateral meniscus repair of right knee        Office Procedures:  Orders Placed This Encounter   Procedures    DJO Hinged Lateral J Knee Stabilizer     Patient was prescribed a DJO Hinged Lateral J Knee Stabilizer brace. The right knee will require stabilization / immobilization from this semi-rigid / rigid orthosis to improve their function. The orthosis will assist in protecting the affected area, provide functional support and facilitate healing. The patient was educated and fit by a healthcare professional with expert knowledge and specialization in brace application while under the direct supervision of the treating physician. Verbal and written instructions for the use of and application of this item were provided. They were instructed to contact the office immediately should the brace result in increased pain, decreased sensation, increased swelling or worsening of the condition. Plan: Pertinent imaging was reviewed. The etiology, natural history, and treatment options for the disorder were discussed. The roles of activity medication, antiinflammatories, injections, bracing, physical therapy, and surgical interventions were all described to the patient and questions were answered. She is having no signs of infection following the completion of the Bactrim. In regards to the squeaking, I believe it is a patellofemoral tracking issue and would benefit from use of a J brace for stabilization and working on patellar mobilization in therapy. Her physical therapist, Jamila Miller, was informed. She is able to drive at this time. Roxyyuly John is in agreement with this plan. All questions were answered to patient's satisfaction and was encouraged to call with any further questions. INancy ATC, am scribing for and in the presence of Dr. Toni Blanco.    06/29/21 11:19 AM Nancy Swartz ATC      I attest that I met personally with the patient, performed the described exam, reviewed the radiographic studies and medical records associated with this patient and supervised the services that are described above.      vEangelist Pekc MD

## 2021-06-29 NOTE — FLOWSHEET NOTE
The [address] [place], [place] [address], 800 [facility] 3360 [facility], [address] [place] [address]  Phone: [phone]   Fax:     [phone]    Physical Therapy Daily Treatment Note  Date:  2021    Patient Name:  Aaliyah Villagran    :  1990  MRN: [id number]  Restrictions/Precautions:    Medical/Treatment Diagnosis Information:  · Diagnosis: N64.647B (ICD-10-CM) - Rupture of anterior cruciate ligament of right knee, initial encounter  · Treatment Diagnosis: M25.561 Right knee pain  Insurance/Certification information:  PT Insurance Information: [employer]  Physician Information:  Referring Practitioner: Fabienne Reno MD  Has the plan of care been signed (Y/N):        [x]  Yes  []  No     Date of Patient follow up with Physician:       Is this a Progress Report:     []  Yes  [x]  No        If Yes:  Date Range for reporting period:  Beginning 21  Ending 21    Progress report will be due (10 Rx or 30 days whichever is less): 3/0/09      Recertification will be due (POC Duration  / 90 days whichever is less): 21         Visit # Insurance Allowable Auth Required   10  10 of 12 visits [place]  12 visits eval included end date 21 [x]  Yes []  No        Functional Scale:   LEFS 65% deficit Date assessed: 21  LEFS 89% deficit Date assessed:  21       Latex Allergy:  [x]NO      []YES  Preferred Language for Healthcare:   [x]English       []other:    Pain level:  0/10     SUBJECTIVE:   Pt states overall she is doing well and incision site is healing with no active exudate. States knee is continuing to squeak.      OBJECTIVE: Updated below on 21  ROM PROM AROM Overpressure Comment     L R   L R  L R     Flexion   125 heel slide 153 NT         Extension   0 0 0          Strength L R Comment   Quad 5 Mild medial quad atrophy     Hamstring 5 NT     Hip  flexion   4-     Hip abd   NT           Girth L R   Mid Patella 34 cm 36 cm     Gait: TROM locked at 0 deg extension, bilateral axillary crutches    Incisions: Incision closed with no active exudate 6/29/21    RESTRICTIONS/PRECAUTIONS: S/P R ACL with BPTB graft, medial and lateral meniscus tear 5/12/21    Exercises/Interventions:   Exercise/Equipment Resistance/Repetitions Other comments   Stretching/AROM       Ankle pumps 5/19 cont with HEP    Bike x5' AAROM rocking 6/23   Calf stretch 30\"hx3 R ^6/15 slant board with protected weight bearing, bilateral axillary crutches.     Hamstring  30\"hx3 R 5/25 Seated EOT, 1/2 roll under ankle    Heel slides 6/29 withheld d/t audible squeak at patellofemoral joint   SB roll ins 6/29 withheld d/t audible squeak at patellofemoral joint   Knee flexion PROM 10\"hx10 R 6/29 completed with assist from L LE at EOT   Heel raises 3x10 bilat 6/15 at table with bilateral UE support   Strengthening      SLR 6/29 completed with BFR   SLR abd 7#  3x10 R ^6/29   SLR add  6/29 completed with BFR   SLR + 30\"hx3 R ^6/29 2#               Neuromuscular re-ed      Quad sets  6/29 progressed with BFR   Adduction isometric  5/19   TKE  6/29 completed with BFR   Biodex weight bearing Completed for 5 minutes educating pt on 50-75% weight bearing based on MD protocol with side to side and back/forth^6/29    Cone walking 6/15 cues for proper knee flexion during swing phase and quad activation during stance phase    Quantum machines       Leg press        Leg extension       Leg curl               Manual interventions                Mario   6/23      Blood Flow Restriction Training  Smart Cuff Size: 4  LOP: 196  PTP (60-80% of LOP): 157 (80%)  Exercise Repetition Weight Repetitions   SLR flex 0# 30,15,15,15   SLR add 0#  30,15,15,15,   TKE Gray TB  30,15,15,15                                      Therapeutic Exercise and NMR EXR  [x] (35080) Provided verbal/tactile cueing for activities related to strengthening, flexibility, endurance, ROM for improvements in LE, proximal hip, and core control with self care, mobility, lifting, ambulation.  [] (74101) Provided verbal/tactile cueing for activities related to improving balance, coordination, kinesthetic sense, posture, motor skill, proprioception  to assist with LE, proximal hip, and core control in self care, mobility, lifting, ambulation and eccentric single leg control. NMR and Therapeutic Activities:    [] (21869 or 31652) Provided verbal/tactile cueing for activities related to improving balance, coordination, kinesthetic sense, posture, motor skill, proprioception and motor activation to allow for proper function of core, proximal hip and LE with self care and ADLs  [] (00401) Gait Re-education- Provided training and instruction to the patient for proper LE, core and proximal hip recruitment and positioning and eccentric body weight control with ambulation re-education including up and down stairs     Home Exercise Program:    [x] (57362) Reviewed/Progressed HEP activities related to strengthening, flexibility, endurance, ROM of core, proximal hip and LE for functional self-care, mobility, lifting and ambulation/stair navigation   [] (38929)Reviewed/Progressed HEP activities related to improving balance, coordination, kinesthetic sense, posture, motor skill, proprioception of core, proximal hip and LE for self care, mobility, lifting, and ambulation/stair navigation      Manual Treatments:  PROM / STM / Oscillations-Mobs:  G-I, II, III, IV (PA's, Inf., Post.)  [] (50399) Provided manual therapy to mobilize LE, proximal hip and/or LS spine soft tissue/joints for the purpose of modulating pain, promoting relaxation,  increasing ROM, reducing/eliminating soft tissue swelling/inflammation/restriction, improving soft tissue extensibility and allowing for proper ROM for normal function with self care, mobility, lifting and ambulation.      Modalities:  CP declined d/t MD appointment 6/29    Charges:  Timed Code Treatment Minutes: 47'   Total Treatment Minutes: 9:38-10:42  59'       [] EVAL (LOW) 46403 (typically 20 minutes face-to-face)  [] EVAL (MOD) 81305 (typically 30 minutes face-to-face)  [] EVAL (HIGH) 44849 (typically 45 minutes face-to-face)  [] RE-EVAL   [x] HP(28336) x 2    [] IONTO  [x] NMR (91924) x 1    [] VASO  [] Manual (55446) x      [] Other:  [x] TA x 1     [] Mech Traction (19680)  [] ES(attended) (76859)      [] ES (un) (44014):     GOALS:   Patient stated goal: Return to [employer]    [x]? Progressing: []? Met: []? Not Met: []? Adjusted     Therapist goals for Patient:   Short Term Goals: To be achieved in: 8 weeks  1. Independent in HEP and progression per patient tolerance, in order to prevent re-injury. [x]? Progressing: []? Met: []? Not Met: []? Adjusted   2. Patient will have a decrease in pain to facilitate improvement in movement, function, and ADLs as indicated by Functional Deficits. [x]? Progressing: []? Met: []? Not Met: []? Adjusted     Long Term Goals: To be achieved in: 16 weeks  1. Disability index score of 20% or less for the LEFS to assist with reaching prior level of function. [x]? Progressing: []? Met: []? Not Met: []? Adjusted  2. Patient will demonstrate increased R knee AROM that is equal to L to allow for proper joint functioning as indicated by patients Functional Deficits. [x]? Progressing: []? Met: []? Not Met: []? Adjusted  3. Patient will demonstrate an increase in R knee strength that is equal to L good proximal hip strength and control  in LE to allow for proper functional mobility as indicated by patients Functional Deficits. [x]? Progressing: []? Met: []? Not Met: []? Adjusted  4. Patient will return to all functional activities without increased symptoms or restriction. [x]? Progressing: []? Met: []? Not Met: []? Adjusted  5.  Patient will complete Biodex Isokinetic strength with 25% or less deficit of quadriceps and hamsrting strength compared to L as well as PKTQ/BW of 40% or greater of quadriceps and 30% of hamstring on L in order to begin return to running progression  [x]? Progressing: []? Met: []? Not Met: []? Adjusted         Overall Progression Towards Functional goals/ Treatment Progress Update:  [x] Patient is progressing as expected towards functional goals listed with increase strength and ROM that is appropriate based on MD orders. [] Progression is slowed due to complexities/Impairments listed. [] Progression has been slowed due to co-morbidities. [] Plan just implemented, too soon to assess goals progression <30days   [] Goals require adjustment due to lack of progress  [] Patient is not progressing as expected and requires additional follow up with physician  [] Other    Prognosis for POC: [x] Good [] Fair  [] Poor      Patient requires continued skilled intervention: [x] Yes  [] No    Treatment/Activity Tolerance:  [x] Patient able to complete treatment  [] Patient limited by fatigue  [] Patient limited by some pain     [] Patient limited by other medical complications  [x] Other: 6/29 Pt ROM continuing to do well but continues to have audible squeak with AROM at patellofemoral joint but decrease in audibility of squeak with passive and active assistive ROM. Responded well to addition of BFR and intensity fo strengthening exercises with no adverse effects. Pt demonstrated appropriate gait sequenicing with 75% weight bearing. Patient Education:           6/29 Increased weight with HEP. Educated on self patellar mobilizations for home based on MD orders  6/23 Updated HEP, reviewed continued use of ice especially with increase time spent in standing. Stressed importance of decreasing swelling to improve knee ROM and quad activation. 6/11 Educated on 25% weight bearing and proper sequencing during gait. 5/27 Updated HEP and reviewed precuations. 5/25 Reviewed appropriate knee flexion ROM and proper fit of TROM.    5/19 Educated on precautions with knee flexion ROM and to not push through pain but work on gentle ROM. Educated on continued use of ice to decrease swelling and for pain control as she progresses off of pain medication   5/12 Educated on importance of ice/elevation to decrease swelling as well as proper stair negotiation with crutches and non weight bearing on R. Also reviewed importance of consistency with HEP. HEP instruction:   Access Code: Ingrid Delacruz: [email or web address]/  Date: 05/12/2021  Prepared by: Marino Gates         PLAN: See nate  [x] Continue per plan of care [] Eligio Hale current plan (see comments above)  [x] Plan of care initiated [] Hold pending MD visit [] Discharge  6/29: Pt saw MD and wants to focus on patella mobilization and was progressed to J Brace over TROM. Pt cleared to begin driving. Electronically signed by:  Maryse Wolf PT    Note: If patient does not return for scheduled/ recommended follow up visits, this note will serve as a discharge from care along with most recent update on progress.

## 2021-07-02 ENCOUNTER — HOSPITAL ENCOUNTER (OUTPATIENT)
Dept: PHYSICAL THERAPY | Age: 31
Setting detail: THERAPIES SERIES
Discharge: HOME | End: 2021-07-02
Payer: COMMERCIAL

## 2021-07-02 PROCEDURE — 97530 THERAPEUTIC ACTIVITIES: CPT

## 2021-07-02 PROCEDURE — 97110 THERAPEUTIC EXERCISES: CPT

## 2021-07-02 PROCEDURE — 97112 NEUROMUSCULAR REEDUCATION: CPT

## 2021-07-02 NOTE — FLOWSHEET NOTE
locked at 0 deg extension, bilateral axillary crutches    Incisions: Incision closed with no active exudate 6/29/21    RESTRICTIONS/PRECAUTIONS: S/P R ACL with BPTB graft, medial and lateral meniscus tear 5/12/21    Exercises/Interventions:   Exercise/Equipment Resistance/Repetitions Other comments   Stretching/AROM       Ankle pumps 5/19 cont with HEP    Bike x5' AROM  7/2 able to complete full revolutions    Calf stretch 30\"hx3 R ^6/15 slant board with protected weight bearing, bilateral axillary crutches.     Hamstring  30\"hx3 R 5/25 Seated EOT, 1/2 roll under ankle    Heel slides 10\"hx10 R7/2 reintroduced d/t decrease in squeak   SB roll ins 6/29 withheld d/t audible squeak at patellofemoral joint   Knee flexion PROM  6/29 completed with assist from L RONNIE at EOT   Hip flexor stretch Educated on how to complete in standing d/t tightness 7/2   Heel raises  6/15 at table with bilateral UE support   Strengthening      SLR 6/29 completed with BFR   SLR abd 7.5#  3x10 R ^7/2   SLR add 7.5# 3x10 R ^7/2   SLR ext 7.5# 3x10 R 7/2   SLR + 30\"hx3 R ^7/2 2.5#               Neuromuscular re-ed      Quad sets  6/29 progressed with BFR   Adduction isometric  5/19   TKE  6/29 completed with BFR   Biodex weight bearing ^6/29    Cone walking 6/15 cues for proper knee flexion during swing phase and quad activation during stance phase    Quantum machines       Leg press        Leg extension       Leg curl               Manual interventions                Mario ADAME patella to imprveo latearl tilt and glide d/t VMO atrophy 7/2     Blood Flow Restriction Training  Smart Cuff Size: 4  LOP: 210  PTP (60-80% of LOP): 168 (80%)  Exercise Repetition Weight Repetitions   SLR flex 1# 30,15,15,15   LAQ with BS 0#  58,77,37,16   Bridges with BS 0# 28,06,19,97   TKE Lal TB  19,64,26,87                                      Therapeutic Exercise and NMR EXR  [x] (55216) Provided verbal/tactile cueing for activities related to strengthening, flexibility, endurance, ROM for improvements in LE, proximal hip, and core control with self care, mobility, lifting, ambulation.  [] (39288) Provided verbal/tactile cueing for activities related to improving balance, coordination, kinesthetic sense, posture, motor skill, proprioception  to assist with LE, proximal hip, and core control in self care, mobility, lifting, ambulation and eccentric single leg control. NMR and Therapeutic Activities:    [] (52149 or 40900) Provided verbal/tactile cueing for activities related to improving balance, coordination, kinesthetic sense, posture, motor skill, proprioception and motor activation to allow for proper function of core, proximal hip and LE with self care and ADLs  [] (33731) Gait Re-education- Provided training and instruction to the patient for proper LE, core and proximal hip recruitment and positioning and eccentric body weight control with ambulation re-education including up and down stairs     Home Exercise Program:    [x] (03751) Reviewed/Progressed HEP activities related to strengthening, flexibility, endurance, ROM of core, proximal hip and LE for functional self-care, mobility, lifting and ambulation/stair navigation   [] (92297)Reviewed/Progressed HEP activities related to improving balance, coordination, kinesthetic sense, posture, motor skill, proprioception of core, proximal hip and LE for self care, mobility, lifting, and ambulation/stair navigation      Manual Treatments:  PROM / STM / Oscillations-Mobs:  G-I, II, III, IV (PA's, Inf., Post.)  [] (23157) Provided manual therapy to mobilize LE, proximal hip and/or LS spine soft tissue/joints for the purpose of modulating pain, promoting relaxation,  increasing ROM, reducing/eliminating soft tissue swelling/inflammation/restriction, improving soft tissue extensibility and allowing for proper ROM for normal function with self care, mobility, lifting and ambulation.      Modalities:  CP R knee x10' 7/2    Charges:  Timed Code Treatment Minutes: 61'   Total Treatment Minutes: 9:58-11:24  80'       [] EVAL (LOW) 455 1011 (typically 20 minutes face-to-face)  [] EVAL (MOD) 61333 (typically 30 minutes face-to-face)  [] EVAL (HIGH) 27030 (typically 45 minutes face-to-face)  [] RE-EVAL   [x] GB(32891) x 2    [] IONTO  [x] NMR (81487) x 1    [] VASO  [] Manual (38225) x      [] Other:  [x] TA x 1     [] Mech Traction (94832)  [] ES(attended) (97668)      [] ES (un) (90193):     GOALS:   Patient stated goal: Return to [employer]    [x]? Progressing: []? Met: []? Not Met: []? Adjusted     Therapist goals for Patient:   Short Term Goals: To be achieved in: 8 weeks  1. Independent in HEP and progression per patient tolerance, in order to prevent re-injury. [x]? Progressing: []? Met: []? Not Met: []? Adjusted   2. Patient will have a decrease in pain to facilitate improvement in movement, function, and ADLs as indicated by Functional Deficits. [x]? Progressing: []? Met: []? Not Met: []? Adjusted     Long Term Goals: To be achieved in: 16 weeks  1. Disability index score of 20% or less for the LEFS to assist with reaching prior level of function. [x]? Progressing: []? Met: []? Not Met: []? Adjusted  2. Patient will demonstrate increased R knee AROM that is equal to L to allow for proper joint functioning as indicated by patients Functional Deficits. [x]? Progressing: []? Met: []? Not Met: []? Adjusted  3. Patient will demonstrate an increase in R knee strength that is equal to L good proximal hip strength and control  in LE to allow for proper functional mobility as indicated by patients Functional Deficits. [x]? Progressing: []? Met: []? Not Met: []? Adjusted  4. Patient will return to all functional activities without increased symptoms or restriction. [x]? Progressing: []? Met: []? Not Met: []? Adjusted  5.  Patient will complete [employer] Isokinetic strength with 25% or less deficit of quadriceps and hamsrting strength compared to L as well as PKTQ/BW of 40% or greater of quadriceps and 30% of hamstring on L in order to begin return to running progression  [x]? Progressing: []? Met: []? Not Met: []? Adjusted         Overall Progression Towards Functional goals/ Treatment Progress Update:  [x] Patient is progressing as expected towards functional goals listed with increase strength and ROM that is appropriate based on MD orders. [] Progression is slowed due to complexities/Impairments listed. [] Progression has been slowed due to co-morbidities. [] Plan just implemented, too soon to assess goals progression <30days   [] Goals require adjustment due to lack of progress  [] Patient is not progressing as expected and requires additional follow up with physician  [] Other    Prognosis for POC: [x] Good [] Fair  [] Poor      Patient requires continued skilled intervention: [x] Yes  [] No    Treatment/Activity Tolerance:  [x] Patient able to complete treatment  [] Patient limited by fatigue  [] Patient limited by some pain     [] Patient limited by other medical complications  [x] Other: 7/2 Pt ROM continuing to do well but continues to have audible squeak with AROM at patellofemoral joint but decrease in audibility and consistency based on last visit. Fatigued with intensity of treatment but no adverse effects. Demonstrated appropriate sequencing during gait with bilateral crutches       Patient Education:           6/29 Increased weight with HEP. Educated on self patellar mobilizations for home based on MD orders  6/23 Updated HEP, reviewed continued use of ice especially with increase time spent in standing. Stressed importance of decreasing swelling to improve knee ROM and quad activation. 6/11 Educated on 25% weight bearing and proper sequencing during gait. 5/27 Updated HEP and reviewed precuations. 5/25 Reviewed appropriate knee flexion ROM and proper fit of TROM.    5/19 Educated on precautions with knee flexion ROM and to not push through pain but work on gentle ROM. Educated on continued use of ice to decrease swelling and for pain control as she progresses off of pain medication   5/12 Educated on importance of ice/elevation to decrease swelling as well as proper stair negotiation with crutches and non weight bearing on R. Also reviewed importance of consistency with HEP. HEP instruction:   Access Code: Marcio Orozco: [employer]/  Date: 05/12/2021  Prepared by: Roberta Mckenzie         PLAN: See eval  [x] Continue per plan of care [] Alter current plan (see comments above)  [x] Plan of care initiated [] Hold pending MD visit [] Discharge  7/2 pt to put weight through R LE as tolerated with bilateral crutches. Progress to unilateral crutch as tolerated NPV     Electronically signed by:  Agatha Barillas, PT    Note: If patient does not return for scheduled/ recommended follow up visits, this note will serve as a discharge from care along with most recent update on progress.

## 2021-07-06 ENCOUNTER — HOSPITAL ENCOUNTER (OUTPATIENT)
Dept: PHYSICAL THERAPY | Age: 31
Setting detail: THERAPIES SERIES
Discharge: HOME | End: 2021-07-06
Payer: COMMERCIAL

## 2021-07-06 PROCEDURE — 97112 NEUROMUSCULAR REEDUCATION: CPT

## 2021-07-06 PROCEDURE — 97530 THERAPEUTIC ACTIVITIES: CPT

## 2021-07-06 PROCEDURE — 97110 THERAPEUTIC EXERCISES: CPT

## 2021-07-06 NOTE — FLOWSHEET NOTE
The [address] [place] [address],[address], 800 [facility] [address] [facility], [address] [place] [address]  Phone: [phone]   Fax:     [phone]    Physical Therapy Daily Treatment Note  Date:  2021    Patient Name:  Etelvina Peguero    :  1990  MRN: [id number]  Restrictions/Precautions:    Medical/Treatment Diagnosis Information:  · Diagnosis: K14.172G (ICD-10-CM) - Rupture of anterior cruciate ligament of right knee, initial encounter  · Treatment Diagnosis: M25.561 Right knee pain  Insurance/Certification information:  PT Insurance Information: [place]  Physician Information:  Referring Practitioner: Shahida Sterling MD  Has the plan of care been signed (Y/N):        [x]  Yes  []  No     Date of Patient follow up with Physician:       Is this a Progress Report:     []  Yes  [x]  No        If Yes:  Date Range for reporting period:  Beginning 21  Ending 21    Progress report will be due (10 Rx or 30 days whichever is less): 19      Recertification will be due (POC Duration  / 90 days whichever is less): 21         Visit # Insurance Allowable Auth Required   12  12 of 12 visits [place]  12 visits eval included end date 21 [x]  Yes []  No        Functional Scale:   LEFS 65% deficit Date assessed: 21  LEFS 89% deficit Date assessed:  21       Latex Allergy:  [x]NO      []YES  Preferred Language for Healthcare:   [x]English       []other:    Pain level:  0/10     SUBJECTIVE:   Pt has been walking with bilateral crutches and weight bearing as tolerated. Spent more time on her feet over the weekend and noticed increase in swelling that decreased with ice and rest. Still has mild irritation and redness at distal incision and squeezed out some blood yesterday.  No active exudate currently and scab has formed      OBJECTIVE: Updated below on 21  ROM PROM AROM Overpressure Comment     L R   L R  L R     Flexion   134 heel slide 153 NT         Extension   0 0 0          Strength L R Comment   Quad 5 Mild medial quad atrophy     Hamstring 5 NT     Hip  flexion   4-     Hip abd   NT           Girth L R   Mid Patella 34 cm 36 cm     Gait: TROM locked at 0 deg extension, bilateral axillary crutches    Incisions: Incision closed with no active exudate 6/29/21    RESTRICTIONS/PRECAUTIONS: S/P R ACL with BPTB graft, medial and lateral meniscus tear 5/12/21    Exercises/Interventions:   Exercise/Equipment Resistance/Repetitions Other comments   Stretching/AROM       Ankle pumps 5/19 cont with HEP    Bike x5' AROM  7/2 able to complete full revolutions    Calf stretch 30\"hx3 R ^6/15 slant board with protected weight bearing, bilateral axillary crutches. Hamstring  30\"hx3 R 5/25 Seated EOT, 1/2 roll under ankle    Heel slides 10\"hx10 R7/2 reintroduced d/t decrease in squeak   SB roll ins 6/29 withheld d/t audible squeak at patellofemoral joint   Knee flexion PROM  6/29 completed with assist from L LE at EOT   Hip flexor stretch  7/2   Heel raises  6/15 at table with bilateral UE support   Strengthening      SLR 6/29 completed with BFR   SLR abd 7.5#  3x15 R ^7/6   SLR add 7.5# 3x10 R ^7/2   SLR ext 7.5# 3x10 R 7/2   SLR + 30\"hx4 R ^7/6 2.5#               Neuromuscular re-ed      Quad sets 10\"on:10\" off x10' with Biofeedback 7/6 reintroduced d/t decrease VMO activation from swelling   Adduction isometric  5/19   TKE  6/29 completed with BFR   Biodex weight bearing ^6/29    Cone walking 6/15 cues for proper knee flexion during swing phase and quad activation during stance phase    Quantum machines       Leg press        Leg extension       Leg curl        CC TKE       Manual interventions                Mario ADAME patella to imprveo latearl tilt and glide d/t VMO atrophy 7/6 7/6 Ambulated around clinic with unilateral crutch in order to work on proper form and sequencing.    Blood Flow Restriction Training  Smart Cuff Size: 4  LOP: 200  PTP (60-80% of LOP): soft tissue/joints for the purpose of modulating pain, promoting relaxation,  increasing ROM, reducing/eliminating soft tissue swelling/inflammation/restriction, improving soft tissue extensibility and allowing for proper ROM for normal function with self care, mobility, lifting and ambulation. Modalities:  CP R knee x15' 7/6    Charges:  Timed Code Treatment Minutes: 72'   Total Treatment Minutes: 2:05-3:45  100'       [] EVAL (LOW) 45481 (typically 20 minutes face-to-face)  [] EVAL (MOD) 93963 (typically 30 minutes face-to-face)  [] EVAL (HIGH) 67021 (typically 45 minutes face-to-face)  [] RE-EVAL   [x] PS(81218) x 2    [] IONTO  [x] NMR (24514) x 1    [] VASO  [] Manual (61647) x      [] Other:  [x] TA x 1     [] Mech Traction (36669)  [] ES(attended) (60732)      [] ES (un) (22952):     GOALS:   Patient stated goal: Return to [employer]    [x]? Progressing: []? Met: []? Not Met: []? Adjusted     Therapist goals for Patient:   Short Term Goals: To be achieved in: 8 weeks  1. Independent in HEP and progression per patient tolerance, in order to prevent re-injury. [x]? Progressing: []? Met: []? Not Met: []? Adjusted   2. Patient will have a decrease in pain to facilitate improvement in movement, function, and ADLs as indicated by Functional Deficits. [x]? Progressing: []? Met: []? Not Met: []? Adjusted     Long Term Goals: To be achieved in: 16 weeks  1. Disability index score of 20% or less for the LEFS to assist with reaching prior level of function. [x]? Progressing: []? Met: []? Not Met: []? Adjusted  2. Patient will demonstrate increased R knee AROM that is equal to L to allow for proper joint functioning as indicated by patients Functional Deficits. [x]? Progressing: []? Met: []? Not Met: []? Adjusted  3.  Patient will demonstrate an increase in R knee strength that is equal to L good proximal hip strength and control  in LE to allow for proper functional mobility as indicated by patients Functional Deficits. [x]? Progressing: []? Met: []? Not Met: []? Adjusted  4. Patient will return to all functional activities without increased symptoms or restriction. [x]? Progressing: []? Met: []? Not Met: []? Adjusted  5. Patient will complete Biodex Isokinetic strength with 25% or less deficit of quadriceps and hamsrting strength compared to L as well as PKTQ/BW of 40% or greater of quadriceps and 30% of hamstring on L in order to begin return to running progression  [x]? Progressing: []? Met: []? Not Met: []? Adjusted         Overall Progression Towards Functional goals/ Treatment Progress Update:  [x] Patient is progressing as expected towards functional goals listed with increase strength and ROM that is appropriate based on MD orders. [] Progression is slowed due to complexities/Impairments listed. [] Progression has been slowed due to co-morbidities. [] Plan just implemented, too soon to assess goals progression <30days   [] Goals require adjustment due to lack of progress  [] Patient is not progressing as expected and requires additional follow up with physician  [] Other    Prognosis for POC: [x] Good [] Fair  [] Poor      Patient requires continued skilled intervention: [x] Yes  [] No    Treatment/Activity Tolerance:  [x] Patient able to complete treatment  [] Patient limited by fatigue  [] Patient limited by some pain     [] Patient limited by other medical complications  [x] Other: 7/6 Hesitancy noted walking with unilateral crutch but able to complete with practice demonstrating appropriate form. Pt reported being on her feet more over the weekend causing increase swelling. D/t increase in swelling, decrease VMO activation noted and therefore increase in squeak at knee. Responded well to Biofeedback allowing for improved quad activation and decrease in squeak noted by end of treatment.        Patient Education:           7/6 Educated on activity modification in order to decrease swelling and improve quad activation. Pt to ambulate with unilateral crutch as tolerated. 6/29 Increased weight with HEP. Educated on self patellar mobilizations for home based on MD orders  6/23 Updated HEP, reviewed continued use of ice especially with increase time spent in standing. Stressed importance of decreasing swelling to improve knee ROM and quad activation. 6/11 Educated on 25% weight bearing and proper sequencing during gait. 5/27 Updated HEP and reviewed precuations. 5/25 Reviewed appropriate knee flexion ROM and proper fit of TROM. 5/19 Educated on precautions with knee flexion ROM and to not push through pain but work on gentle ROM. Educated on continued use of ice to decrease swelling and for pain control as she progresses off of pain medication   5/12 Educated on importance of ice/elevation to decrease swelling as well as proper stair negotiation with crutches and non weight bearing on R. Also reviewed importance of consistency with HEP. HEP instruction:   Access Code: [email or web address] Brock: [email or web address]  Date: 05/12/2021  Prepared by: Princess Guaman         PLAN: See eval  [x] Continue per plan of care [] Alter current plan (see comments above)  [x] Plan of care initiated [] Hold pending MD visit [] Discharge  7/6 Pt to ambulate with unilateral crutch as tolerated. Continue to monitor squeak at knee as well as incision site. Progress with strengthening as tolerated based on MD protocol. Electronically signed by:  Sary Higgins PT    Note: If patient does not return for scheduled/ recommended follow up visits, this note will serve as a discharge from care along with most recent update on progress.

## 2021-07-08 ENCOUNTER — TELEPHONE (OUTPATIENT)
Dept: ORTHOPEDIC SURGERY | Age: 31
End: 2021-07-08

## 2021-07-08 NOTE — TELEPHONE ENCOUNTER
General Question     Subject: PATIENT AT DENTAL OFFICE, NEED TO KNOW IF ANTIBIOTIC IS NEEDED BEFORE DENTAL CLEANING?   Patient and /or Facility Request: PATIENT  Contact Number: [phone] None

## 2021-07-09 ENCOUNTER — APPOINTMENT (OUTPATIENT)
Dept: PHYSICAL THERAPY | Age: 31
End: 2021-07-09
Payer: COMMERCIAL

## 2021-07-13 ENCOUNTER — HOSPITAL ENCOUNTER (OUTPATIENT)
Dept: PHYSICAL THERAPY | Age: 31
Setting detail: THERAPIES SERIES
Discharge: HOME | End: 2021-07-13
Payer: COMMERCIAL

## 2021-07-13 PROCEDURE — 97112 NEUROMUSCULAR REEDUCATION: CPT

## 2021-07-13 PROCEDURE — 97110 THERAPEUTIC EXERCISES: CPT

## 2021-07-13 PROCEDURE — 97530 THERAPEUTIC ACTIVITIES: CPT

## 2021-07-13 NOTE — FLOWSHEET NOTE
The [address] [place] [address],[address], [address], [address] [place] [address]  Phone: [phone]   Fax:     [phone]    Physical Therapy Progress/Daily Treatment Note  Date:  2021    Patient Name:  Sanjana Osman    :  1990  MRN: [id number]  Restrictions/Precautions:    Medical/Treatment Diagnosis Information:  · Diagnosis: S18.135Q (ICD-10-CM) - Rupture of anterior cruciate ligament of right knee, initial encounter  · Treatment Diagnosis: M25.561 Right knee pain  Insurance/Certification information:  PT Insurance Information: [place]  Physician Information:  Referring Practitioner: Divina Owens MD  Has the plan of care been signed (Y/N):        [x]  Yes  []  No     Date of Patient follow up with Physician:       Is this a Progress Report:     [x]  Yes  []  No        If Yes:  Date Range for reporting period:  Beginning 21  Ending 21    Progress report will be due (10 Rx or 30 days whichever is less): 3/12/43      Recertification will be due (POC Duration  / 90 days whichever is less): 21         Visit # Insurance Allowable Auth Required   1  1 of 10 visits  13 visits total [place]   10 visits  -  12 visits eval included end date 21 [x]  Yes []  No        Functional Scale:   LEFS 59% deficit Date assessed: 21  LEFS 65% deficit Date assessed: 21  LEFS 89% deficit Date assessed:  21       Latex Allergy:  [x]NO      []YES  Preferred Language for Healthcare:   [x]English       []other:    Pain level:  0/10     SUBJECTIVE:  : Pt states she has been doing well walking with one crutch and no issues after last visit.      OBJECTIVE: Updated below on   ROM PROM AROM Overpressure Comment     L R   L R  L R     Flexion   138 heel slide 153 135      Audible squeak at R knee with flexion AROM, denies any pain    Extension   0 0 0          Strength L R Comment   Quad 5 4-     Hamstring 5 4     Hip flexion  5 4     Hip abd  5 4+           Girth L R   Mid Patella 34 cm 34.5 cm     Gait: Unilateral crutch on L with proper sequencing noted. Gait without crutch demonstrated decrease weight bearing on L with mild circumduction. Incisions: Incision closed with no s/s of infection   Palpation: No tendnerness   RESTRICTIONS/PRECAUTIONS: S/P R ACL with BPTB graft, medial and lateral meniscus tear 5/12/21    Exercises/Interventions:   Exercise/Equipment Resistance/Repetitions Other comments   Stretching/AROM       Ankle pumps 5/19 cont with HEP    Bike x5' AROM  7/2 able to complete full revolutions    Calf stretch 30\"hx3 R ^6/15 slant board with protected weight bearing, bilateral axillary crutches.     Hamstring  30\"hx3 R 5/25 Seated EOT, 1/2 roll under ankle    Heel slides 10\"hx10 R7/2 reintroduced d/t decrease in squeak   SB roll ins 6/29 withheld d/t audible squeak at patellofemoral joint   Knee flexion PROM  6/29 completed with assist from L LE at EOT   Hip flexor stretch  7/2   Heel raises  6/15 at table with bilateral UE support   Strengthening      SLR 6/29 completed with BFR   SLR abd 9#  3x15 R ^7/13   SLR add 9# 3x10 R ^7/13   SLR ext 9# 3x10 R ^713   SLR + 30\"hx5 R ^7/13 2.5#               Neuromuscular re-ed      Quad sets  7/6 reintroduced d/t decrease VMO activation from swelling   Adduction isometric  5/19   TKE  6/29 completed with BFR   Biodex weight bearing ^6/29    Cone walking 6/15 cues for proper knee flexion during swing phase and quad activation during stance phase    SLS  10\"hx10 R 7/13   Marching 3x10 alt LE 7/13   Wall sits 30\"hx3  7/13 45 deg knee flexion   Quantum machines       Leg press        Leg extension       Leg curl        CC TKE       Manual interventions                Mario ADAME patella to imprveo latearl tilt and glide d/t VMO atrophy 7/13 7/13 Worked on gait training without AD for short distances     Blood Flow Restriction Training  Smart Cuff Size: 4  LOP: 212  PTP (60-80% of LOP): 170 (80%)  Exercise Repetition Weight Repetitions   SLR flex 3# 30,15,15,15   LAQ with BS 3#  57,82,62,28   Bridges with BS 0# 09,03,88,12   TKE 3 crthxw26,15,15,15                                      Therapeutic Exercise and NMR EXR  [x] (01616) Provided verbal/tactile cueing for activities related to strengthening, flexibility, endurance, ROM for improvements in LE, proximal hip, and core control with self care, mobility, lifting, ambulation.  [] (69196) Provided verbal/tactile cueing for activities related to improving balance, coordination, kinesthetic sense, posture, motor skill, proprioception  to assist with LE, proximal hip, and core control in self care, mobility, lifting, ambulation and eccentric single leg control.      NMR and Therapeutic Activities:    [] (93055 or 71202) Provided verbal/tactile cueing for activities related to improving balance, coordination, kinesthetic sense, posture, motor skill, proprioception and motor activation to allow for proper function of core, proximal hip and LE with self care and ADLs  [] (54514) Gait Re-education- Provided training and instruction to the patient for proper LE, core and proximal hip recruitment and positioning and eccentric body weight control with ambulation re-education including up and down stairs     Home Exercise Program:    [x] (75209) Reviewed/Progressed HEP activities related to strengthening, flexibility, endurance, ROM of core, proximal hip and LE for functional self-care, mobility, lifting and ambulation/stair navigation   [] (84667)Reviewed/Progressed HEP activities related to improving balance, coordination, kinesthetic sense, posture, motor skill, proprioception of core, proximal hip and LE for self care, mobility, lifting, and ambulation/stair navigation      Manual Treatments:  PROM / STM / Oscillations-Mobs:  G-I, II, III, IV (PA's, Inf., Post.)  [] (63634) Provided manual therapy to mobilize LE, proximal hip and/or LS spine soft tissue/joints for the purpose of modulating pain, promoting relaxation,  increasing ROM, reducing/eliminating soft tissue swelling/inflammation/restriction, improving soft tissue extensibility and allowing for proper ROM for normal function with self care, mobility, lifting and ambulation. Modalities:  CP R knee x15' 7/13    Charges:  Timed Code Treatment Minutes: 72'   Total Treatment Minutes: 10:15-11:55  100'       [] EVAL (LOW) 89949 (typically 20 minutes face-to-face)  [] EVAL (MOD) 28353 (typically 30 minutes face-to-face)  [] EVAL (HIGH) 82500 (typically 45 minutes face-to-face)  [] RE-EVAL   [x] UY(11104) x 2    [] IONTO  [x] NMR (14710) x 1    [] VASO  [] Manual (34210) x      [] Other:  [x] TA x 1     [] Mech Traction (53145)  [] ES(attended) (92111)      [] ES (un) (12738):     GOALS:   Patient stated goal: Return to [employer]    [x]? Progressing: []? Met: []? Not Met: []? Adjusted     Therapist goals for Patient:   Short Term Goals: To be achieved in: 8 weeks  1. Independent in HEP and progression per patient tolerance, in order to prevent re-injury. [x]? Progressing: []? Met: []? Not Met: []? Adjusted   2. Patient will have a decrease in pain to facilitate improvement in movement, function, and ADLs as indicated by Functional Deficits. []? Progressing: [x]? Met: []? Not Met: []? Adjusted     Long Term Goals: To be achieved in: 16 weeks  1. Disability index score of 20% or less for the LEFS to assist with reaching prior level of function. [x]? Progressing: []? Met: []? Not Met: []? Adjusted  2. Patient will demonstrate increased R knee AROM that is equal to L to allow for proper joint functioning as indicated by patients Functional Deficits. [x]? Progressing: []? Met: []? Not Met: []? Adjusted  3.  Patient will demonstrate an increase in R knee strength that is equal to L good proximal hip strength and control  in LE to allow for proper functional mobility as indicated by patients Functional Deficits. [x]? Progressing: []? Met: []? Not Met: []? Adjusted  4. Patient will return to all functional activities without increased symptoms or restriction. [x]? Progressing: []? Met: []? Not Met: []? Adjusted  5. Patient will complete Biodex Isokinetic strength with 25% or less deficit of quadriceps and hamsrting strength compared to L as well as PKTQ/BW of 40% or greater of quadriceps and 30% of hamstring on L in order to begin return to running progression  [x]? Progressing: []? Met: []? Not Met: []? Adjusted         Overall Progression Towards Functional goals/ Treatment Progress Update:  [] Patient is progressing as expected towards functional goals listed with increase strength and ROM that is appropriate based on MD orders. [x] Progression is slowed due to complexities of surgery as well as audible squeaking at patellofemoral joint and mild drainage at distal incision. Incision has healed and sound at patellofemarl joint is improving however pt is continuing to progress off of assistive device d/t early MD precautions. Based on surgical intervention, objective deficits and MD protocol she is appropriate for continued PT to further increase strength and ROM allowing for protection of surgical intervention as well as increase in functional independence 7/13/21  [] Progression has been slowed due to co-morbidities.   [] Plan just implemented, too soon to assess goals progression <30days   [] Goals require adjustment due to lack of progress  [] Patient is not progressing as expected and requires additional follow up with physician  [] Other    Prognosis for POC: [x] Good [] Fair  [] Poor      Patient requires continued skilled intervention: [x] Yes  [] No    Treatment/Activity Tolerance:  [x] Patient able to complete treatment  [] Patient limited by fatigue  [] Patient limited by some pain     [] Patient limited by other medical complications  [x] Other: 7/13 Hesitancy noted walking without crutch and therefore attempted SLS and marching which helped gain pt confidence allowing her to ambulate around clinic without assistive device. D/t hesitancy during stance phase of R LE during gait it was recommended she only attempt ambulating without crutch around house and continue to use crutch for community ambulation. Pt fatigued with increase in intensity of treatment but no adverse effects noted or reported. Decrease in audible squeak at patellofemoral joint with McConnel taping. Patient Education:           7/13 Educated on proper use of unilateral crutch for community ambulation but to attempt ambulating around house without AD as tolerated. 7/6 Educated on activity modification in order to decrease swelling and improve quad activation. Pt to ambulate with unilateral crutch as tolerated. 6/29 Increased weight with HEP. Educated on self patellar mobilizations for home based on MD orders  6/23 Updated HEP, reviewed continued use of ice especially with increase time spent in standing. Stressed importance of decreasing swelling to improve knee ROM and quad activation. 6/11 Educated on 25% weight bearing and proper sequencing during gait. 5/27 Updated HEP and reviewed precuations. 5/25 Reviewed appropriate knee flexion ROM and proper fit of TROM. 5/19 Educated on precautions with knee flexion ROM and to not push through pain but work on gentle ROM. Educated on continued use of ice to decrease swelling and for pain control as she progresses off of pain medication   5/12 Educated on importance of ice/elevation to decrease swelling as well as proper stair negotiation with crutches and non weight bearing on R. Also reviewed importance of consistency with HEP. HEP instruction:   Access Code: Bo Torres: [email or web address]  Date: 05/12/2021  Prepared by: Maury Banks         PLAN: See eval  [x] Continue per plan of care [] Alter current plan (see comments above)  [x] Plan of care initiated [] Hold pending MD visit [] Discharge  7/6 Pt to ambulate without crutch for home ambulation and unilateral crutch for community ambulation as tolerated. Continue to monitor squeak with active knee ROM. Progress with strengthening as tolerated based on MD protocol. Electronically signed by:  Stuart Nyhan, PT    Note: If patient does not return for scheduled/ recommended follow up visits, this note will serve as a discharge from care along with most recent update on progress.

## 2021-07-16 ENCOUNTER — HOSPITAL ENCOUNTER (OUTPATIENT)
Dept: PHYSICAL THERAPY | Age: 31
Setting detail: THERAPIES SERIES
Discharge: HOME | End: 2021-07-16
Payer: COMMERCIAL

## 2021-07-16 PROCEDURE — 97110 THERAPEUTIC EXERCISES: CPT | Performed by: PHYSICAL THERAPIST

## 2021-07-16 PROCEDURE — 97530 THERAPEUTIC ACTIVITIES: CPT | Performed by: PHYSICAL THERAPIST

## 2021-07-16 PROCEDURE — 97112 NEUROMUSCULAR REEDUCATION: CPT | Performed by: PHYSICAL THERAPIST

## 2021-07-16 NOTE — FLOWSHEET NOTE
The [address] [place] [address],[address], [address], [address] [place] [address]  Phone: [phone]   Fax:     [phone]    Physical Therapy Progress/Daily Treatment Note  Date:  2021    Patient Name:  Cristiane Sample    :  1990  MRN: [id number]  Restrictions/Precautions:    Medical/Treatment Diagnosis Information:  · Diagnosis: L24.678X (ICD-10-CM) - Rupture of anterior cruciate ligament of right knee, initial encounter  · Treatment Diagnosis: M25.561 Right knee pain  Insurance/Certification information:  PT Insurance Information: [employer]  Physician Information:  Referring Practitioner: Clemente Garcia MD  Has the plan of care been signed (Y/N):        [x]  Yes  []  No     Date of Patient follow up with Physician:       Is this a Progress Report:     []  Yes  []  No        If Yes:  Date Range for reporting period:  Beginning 21  Ending 21    Progress report will be due (10 Rx or 30 days whichever is less):       Recertification will be due (POC Duration  / 90 days whichever is less): 21         Visit # Insurance Allowable Auth Required   2  2 of 10 visits  13 visits total [employer]   10 visits  -  12 visits eval included end date 21 [x]  Yes []  No        Functional Scale:   LEFS 59% deficit Date assessed: 21  LEFS 65% deficit Date assessed: 21  LEFS 89% deficit Date assessed:  21       Latex Allergy:  [x]NO      []YES  Preferred Language for Healthcare:   [x]English       []other:    Pain level:  0/10     SUBJECTIVE:  : Pt states she has been doing well walking around her home with no AD.  Pt states that when she first gets up, gait sequencing is a challenge and causes her to have an antalgic gait but as she walks gait becomes more normal.     OBJECTIVE: Updated below on 21  ROM PROM AROM Overpressure Comment     L R   L R  L R     Flexion   138 heel slide 153 135      Audible squeak at R knee with flexion AROM, denies any pain    Extension   0 0 0          Strength L R Comment   Quad 5 4-     Hamstring 5 4     Hip  flexion  5 4     Hip abd  5 4+           Girth L R   Mid Patella 34 cm 34.5 cm     Gait: Unilateral crutch on L with proper sequencing noted. Gait without crutch demonstrated decrease weight bearing on L with mild circumduction. Incisions: Incision closed with no s/s of infection   Palpation: No tendnerness   RESTRICTIONS/PRECAUTIONS: S/P R ACL with BPTB graft, medial and lateral meniscus tear 5/12/21    Exercises/Interventions:   Exercise/Equipment Resistance/Repetitions Other comments   Stretching/AROM       Ankle pumps 5/19 cont with HEP    Bike x5' AROM  7/2 able to complete full revolutions    Calf stretch 30\"hx3 R ^6/15 slant board with protected weight bearing, bilateral axillary crutches.     Hamstring  30\"hx3 R 5/25 Seated EOT, 1/2 roll under ankle    Heel slides 10\"hx10 R7/2 reintroduced d/t decrease in squeak   SB roll ins 6/29 withheld d/t audible squeak at patellofemoral joint   Knee flexion PROM  6/29 completed with assist from L LE at EOT   Hip flexor stretch  7/2   Heel raises  6/15 at table with bilateral UE support   Strengthening      SLR 6/29 completed with BFR   SLR abd 9#  3x15 R ^7/13   SLR add 9# 3x10 R ^7/13   SLR ext 9# 3x10 R ^713   SLR + 30\"hx5 R ^7/13 2.5#    LAQ with BS 3# 3x10 R 7/16 Completed without BFR d/t pain and crepitis before reapplying          Neuromuscular re-ed      Quad sets  7/6 reintroduced d/t decrease VMO activation from swelling   Adduction isometric  5/19   TKE  6/29 completed with BFR   Biodex weight bearing ^6/29    [facility] walking 6/15 cues for proper knee flexion during swing phase and quad activation during stance phase    SLS  30\"hx3 7/16   Marching  7/13   Wall sits 30\"hx3  7/16 Knees bent at 45 deg   Quantum machines       Leg press        Leg extension       Leg curl        CC TKE       Manual interventions Mario  R patella to imprveo latearl tilt and glide d/t VMO atrophy. Reapplied tape 7/16 7/13 Worked on gait training without AD for short distances. Pt began stepping with antalgic gait on R but normalizes over time with normal gait sequencing. Blood Flow Restriction Training  Smart Cuff Size: 4  LOP: 212  PTP (60-80% of LOP): 170 (80%)  Exercise Repetition Weight Repetitions   SLR flex 3# 30,15,15,15   Bridges with BS 0# 69,10,52,60                              7/16: LAQ and TKEwith BFR withheld d/t pt c/o pain and crepitus and discomfort in R knee during knee extension without weight or BFR engaged. Taping reapplied and decreased crepitus and discomfort. Therapeutic Exercise and NMR EXR  [x] (77735) Provided verbal/tactile cueing for activities related to strengthening, flexibility, endurance, ROM for improvements in LE, proximal hip, and core control with self care, mobility, lifting, ambulation.  [] (18363) Provided verbal/tactile cueing for activities related to improving balance, coordination, kinesthetic sense, posture, motor skill, proprioception  to assist with LE, proximal hip, and core control in self care, mobility, lifting, ambulation and eccentric single leg control.      NMR and Therapeutic Activities:    [] (70455 or 48189) Provided verbal/tactile cueing for activities related to improving balance, coordination, kinesthetic sense, posture, motor skill, proprioception and motor activation to allow for proper function of core, proximal hip and LE with self care and ADLs  [] (62164) Gait Re-education- Provided training and instruction to the patient for proper LE, core and proximal hip recruitment and positioning and eccentric body weight control with ambulation re-education including up and down stairs     Home Exercise Program:    [x] (55943) Reviewed/Progressed HEP activities related to strengthening, flexibility, endurance, ROM of core, proximal hip and LE for functional self-care, mobility, lifting and ambulation/stair navigation   [] (37899)Reviewed/Progressed HEP activities related to improving balance, coordination, kinesthetic sense, posture, motor skill, proprioception of core, proximal hip and LE for self care, mobility, lifting, and ambulation/stair navigation      Manual Treatments:  PROM / STM / Oscillations-Mobs:  G-I, II, III, IV (PA's, Inf., Post.)  [] (03179) Provided manual therapy to mobilize LE, proximal hip and/or LS spine soft tissue/joints for the purpose of modulating pain, promoting relaxation,  increasing ROM, reducing/eliminating soft tissue swelling/inflammation/restriction, improving soft tissue extensibility and allowing for proper ROM for normal function with self care, mobility, lifting and ambulation. Modalities:  CP R knee x15' 7/16    Charges:  Timed Code Treatment Minutes: 2500 Wa[address] Alvarez'   Total Treatment Minutes: 9:18-10:48  90'       [] EVAL (LOW) 15434 (typically 20 minutes face-to-face)  [] EVAL (MOD) 58933 (typically 30 minutes face-to-face)  [] EVAL (HIGH) 15821 (typically 45 minutes face-to-face)  [] RE-EVAL   [x] ESPINOSA(41733) x 2    [] IONTO  [x] NMR (43233) x 1    [] VASO  [] Manual (36636) x      [] Other:  [x] TA x 1     [] Mech Traction (80505)  [] ES(attended) (26639)      [] ES (un) (18377):     GOALS:   Patient stated goal: Return to [employer]    [x]? Progressing: []? Met: []? Not Met: []? Adjusted     Therapist goals for Patient:   Short Term Goals: To be achieved in: 8 weeks  1. Independent in HEP and progression per patient tolerance, in order to prevent re-injury. [x]? Progressing: []? Met: []? Not Met: []? Adjusted   2. Patient will have a decrease in pain to facilitate improvement in movement, function, and ADLs as indicated by Functional Deficits. []? Progressing: [x]? Met: []? Not Met: []? Adjusted     Long Term Goals: To be achieved in: 16 weeks  1.  Disability index score of 20% or less for the LEFS to assist with reaching prior level of function. [x]? Progressing: []? Met: []? Not Met: []? Adjusted  2. Patient will demonstrate increased R knee AROM that is equal to L to allow for proper joint functioning as indicated by patients Functional Deficits. [x]? Progressing: []? Met: []? Not Met: []? Adjusted  3. Patient will demonstrate an increase in R knee strength that is equal to L good proximal hip strength and control  in LE to allow for proper functional mobility as indicated by patients Functional Deficits. [x]? Progressing: []? Met: []? Not Met: []? Adjusted  4. Patient will return to all functional activities without increased symptoms or restriction. [x]? Progressing: []? Met: []? Not Met: []? Adjusted  5. Patient will complete Biodex Isokinetic strength with 25% or less deficit of quadriceps and hamsrting strength compared to L as well as PKTQ/BW of 40% or greater of quadriceps and 30% of hamstring on L in order to begin return to running progression  [x]? Progressing: []? Met: []? Not Met: []? Adjusted         Overall Progression Towards Functional goals/ Treatment Progress Update:  [] Patient is progressing as expected towards functional goals listed with increase strength and ROM that is appropriate based on MD orders. [x] Progression is slowed due to complexities of surgery as well as audible squeaking at patellofemoral joint and mild drainage at distal incision. Incision has healed and sound at patellofemarl joint is improving however pt is continuing to progress off of assistive device d/t early MD precautions. Based on surgical intervention, objective deficits and MD protocol she is appropriate for continued PT to further increase strength and ROM allowing for protection of surgical intervention as well as increase in functional independence 7/13/21  [] Progression has been slowed due to co-morbidities.   [] Plan just implemented, too soon to assess goals progression <30days   [] Goals require adjustment due to lack of progress  [] Patient is not progressing as expected and requires additional follow up with physician  [] Other    Prognosis for POC: [x] Good [] Fair  [] Poor      Patient requires continued skilled intervention: [x] Yes  [] No    Treatment/Activity Tolerance:  [x] Patient able to complete treatment  [] Patient limited by fatigue  [] Patient limited by some pain     [] Patient limited by other medical complications  [x] Other: 7/16 Pt has issues initiating gait sequence with no AD causing antalgic gait pattern. Pt had audible squeaking in R knee that was mildly corrected with 1 round of McConnel taping but had to be reapplied after pt c/o discomfort and pain in R knee. Squeaking and discomfort decreased following reapplication of taping. Pt was able to complete treatment with some exercises excluded from BFR treatment d/t discomfort. Patient Education:           7/16 educated on decreasing ambulation with no AD and to revert to 1 crutch around the house if symptoms in R knee increase or increased antalgic gait with no AD.  7/13 Educated on proper use of unilateral crutch for community ambulation but to attempt ambulating around house without AD as tolerated. 7/6 Educated on activity modification in order to decrease swelling and improve quad activation. Pt to ambulate with unilateral crutch as tolerated. 6/29 Increased weight with HEP. Educated on self patellar mobilizations for home based on MD orders  6/23 Updated HEP, reviewed continued use of ice especially with increase time spent in standing. Stressed importance of decreasing swelling to improve knee ROM and quad activation. 6/11 Educated on 25% weight bearing and proper sequencing during gait. 5/27 Updated HEP and reviewed precuations. 5/25 Reviewed appropriate knee flexion ROM and proper fit of TROM. 5/19 Educated on precautions with knee flexion ROM and to not push through pain but work on gentle ROM.  Educated on continued use of ice to decrease swelling and for pain control as she progresses off of pain medication   5/12 Educated on importance of ice/elevation to decrease swelling as well as proper stair negotiation with crutches and non weight bearing on R. Also reviewed importance of consistency with HEP. HEP instruction:   Access Code: Elena Siu: [email or web address]  Date: 05/12/2021  Prepared by: Aroldo George         PLAN: See eval  [x] Continue per plan of care [] Alter current plan (see comments above)  [x] Plan of care initiated [] Hold pending MD visit [] Discharge  7/16 Continue ambulation with single crutch in community and no AD as tolerated unless antalgic gait around her home. Monitor crepitus and squeak in R knee with knee ROM. Lanie Falcon, SPT  Therapist was present, directed the patient's care, made skilled judgement, and was responsible for assessment and treatment of the patient. Electronically signed by:  Kenzie Lawson PT    Note: If patient does not return for scheduled/ recommended follow up visits, this note will serve as a discharge from care along with most recent update on progress.

## 2021-07-20 ENCOUNTER — APPOINTMENT (OUTPATIENT)
Dept: PHYSICAL THERAPY | Age: 31
End: 2021-07-20
Payer: COMMERCIAL

## 2021-07-21 ENCOUNTER — HOSPITAL ENCOUNTER (OUTPATIENT)
Dept: PHYSICAL THERAPY | Age: 31
Setting detail: THERAPIES SERIES
Discharge: HOME | End: 2021-07-21
Payer: COMMERCIAL

## 2021-07-21 NOTE — FLOWSHEET NOTE
Physical Therapy  Cancellation/No-show Note  Patient Name:  Scarlet Perez  :  1990   Date:  2021  Cancelled visits to date: 1  No-shows to date: 0    For today's appointment patient:  [x]  Cancelled  []  Rescheduled appointment  []  No-show     Reason given by patient:  [x]  Patient ill  []  Conflicting appointment  []  No transportation    []  Conflict with work  []  No reason given  []  Other:     Comments:      Electronically signed by:  Danae Son PT, PT

## 2021-07-23 ENCOUNTER — HOSPITAL ENCOUNTER (OUTPATIENT)
Dept: PHYSICAL THERAPY | Age: 31
Setting detail: THERAPIES SERIES
Discharge: HOME | End: 2021-07-23
Payer: COMMERCIAL

## 2021-07-23 PROCEDURE — 97110 THERAPEUTIC EXERCISES: CPT

## 2021-07-23 PROCEDURE — 97530 THERAPEUTIC ACTIVITIES: CPT

## 2021-07-23 PROCEDURE — 97112 NEUROMUSCULAR REEDUCATION: CPT

## 2021-07-23 NOTE — FLOWSHEET NOTE
30# 3x10 bilat 7/23 10<>45    CC TKE  4 plates 3U39 R 1/22   Manual interventions                uHgo Lee 7/16 7/23 Worked on proper gait sequencing with emphasis on knee flexion during swing phase without assistive device     Blood Flow Restriction Training  Smart Cuff Size: 4  LOP: 212  PTP (60-80% of LOP): 170 (80%)  Exercise Repetition Weight Repetitions                                      Therapeutic Exercise and NMR EXR  [x] (64608) Provided verbal/tactile cueing for activities related to strengthening, flexibility, endurance, ROM for improvements in LE, proximal hip, and core control with self care, mobility, lifting, ambulation.  [] (76246) Provided verbal/tactile cueing for activities related to improving balance, coordination, kinesthetic sense, posture, motor skill, proprioception  to assist with LE, proximal hip, and core control in self care, mobility, lifting, ambulation and eccentric single leg control.      NMR and Therapeutic Activities:    [] (29036 or 54705) Provided verbal/tactile cueing for activities related to improving balance, coordination, kinesthetic sense, posture, motor skill, proprioception and motor activation to allow for proper function of core, proximal hip and LE with self care and ADLs  [] (49166) Gait Re-education- Provided training and instruction to the patient for proper LE, core and proximal hip recruitment and positioning and eccentric body weight control with ambulation re-education including up and down stairs     Home Exercise Program:    [x] (68489) Reviewed/Progressed HEP activities related to strengthening, flexibility, endurance, ROM of core, proximal hip and LE for functional self-care, mobility, lifting and ambulation/stair navigation   [] (85105)Reviewed/Progressed HEP activities related to improving balance, coordination, kinesthetic sense, posture, motor skill, proprioception of core, proximal hip and LE for self care, mobility, lifting, and ambulation/stair navigation      Manual Treatments:  PROM / STM / Oscillations-Mobs:  G-I, II, III, IV (PA's, Inf., Post.)  [] (08434) Provided manual therapy to mobilize LE, proximal hip and/or LS spine soft tissue/joints for the purpose of modulating pain, promoting relaxation,  increasing ROM, reducing/eliminating soft tissue swelling/inflammation/restriction, improving soft tissue extensibility and allowing for proper ROM for normal function with self care, mobility, lifting and ambulation. Modalities:  CP R knee x15' 7/23    Charges:  Timed Code Treatment Minutes: 77'   Total Treatment Minutes: 9:11-10:48  97       [] EVAL (LOW) 16244 (typically 20 minutes face-to-face)  [] EVAL (MOD) 36615 (typically 30 minutes face-to-face)  [] EVAL (HIGH) 34922 (typically 45 minutes face-to-face)  [] RE-EVAL   [x] LP(15479) x 2    [] IONTO  [x] NMR (75673) x 1    [] VASO  [] Manual (61082) x      [] Other:  [x] TA x 1     [] Mech Traction (78318)  [] ES(attended) (65590)      [] ES (un) (55525):     GOALS:   Patient stated goal: Return to [employer]    [x]? Progressing: []? Met: []? Not Met: []? Adjusted     Therapist goals for Patient:   Short Term Goals: To be achieved in: 8 weeks  1. Independent in HEP and progression per patient tolerance, in order to prevent re-injury. [x]? Progressing: []? Met: []? Not Met: []? Adjusted   2. Patient will have a decrease in pain to facilitate improvement in movement, function, and ADLs as indicated by Functional Deficits. []? Progressing: [x]? Met: []? Not Met: []? Adjusted     Long Term Goals: To be achieved in: 16 weeks  1. Disability index score of 20% or less for the LEFS to assist with reaching prior level of function. [x]? Progressing: []? Met: []? Not Met: []? Adjusted  2. Patient will demonstrate increased R knee AROM that is equal to L to allow for proper joint functioning as indicated by patients Functional Deficits. [x]? Progressing: []? Met: []?  Not Met: []? Adjusted  3. Patient will demonstrate an increase in R knee strength that is equal to L good proximal hip strength and control  in LE to allow for proper functional mobility as indicated by patients Functional Deficits. [x]? Progressing: []? Met: []? Not Met: []? Adjusted  4. Patient will return to all functional activities without increased symptoms or restriction. [x]? Progressing: []? Met: []? Not Met: []? Adjusted  5. Patient will complete Biodex Isokinetic strength with 25% or less deficit of quadriceps and hamsrting strength compared to L as well as PKTQ/BW of 40% or greater of quadriceps and 30% of hamstring on L in order to begin return to running progression  [x]? Progressing: []? Met: []? Not Met: []? Adjusted         Overall Progression Towards Functional goals/ Treatment Progress Update:  [] Patient is progressing as expected towards functional goals listed with increase strength and ROM that is appropriate based on MD orders. [x] Progression is slowed due to complexities of surgery as well as audible squeaking at patellofemoral joint and mild drainage at distal incision. Incision has healed and sound at patellofemarl joint is improving however pt is continuing to progress off of assistive device d/t early MD precautions. Based on surgical intervention, objective deficits and MD protocol she is appropriate for continued PT to further increase strength and ROM allowing for protection of surgical intervention as well as increase in functional independence 7/13/21  [] Progression has been slowed due to co-morbidities.   [] Plan just implemented, too soon to assess goals progression <30days   [] Goals require adjustment due to lack of progress  [] Patient is not progressing as expected and requires additional follow up with physician  [] Other    Prognosis for POC: [x] Good [] Fair  [] Poor      Patient requires continued skilled intervention: [x] Yes  [] No    Treatment/Activity Tolerance:  [x] Patient able to complete treatment  [] Patient limited by fatigue  [] Patient limited by some pain     [] Patient limited by other medical complications  [x] Other: 7/23 Arrived ambulating without assistive device but demonstrated hip circumduction on R during swing phase that improved with cueing and practice. Fatigued with increase in intensity of treatment but no audible noise noted from R knee during treatment. Overall responded well with no adverse effects. Patient Education:           7/23 Reviewed proper gait sequencing and activity modification with walking around neighborhood to decrease risk of increase in symptoms. Updated HEP based on tolerance to treatment. 7/16 educated on decreasing ambulation with no AD and to revert to 1 crutch around the house if symptoms in R knee increase or increased antalgic gait with no AD.  7/13 Educated on proper use of unilateral crutch for community ambulation but to attempt ambulating around house without AD as tolerated. 7/6 Educated on activity modification in order to decrease swelling and improve quad activation. Pt to ambulate with unilateral crutch as tolerated. 6/29 Increased weight with HEP. Educated on self patellar mobilizations for home based on MD orders  6/23 Updated HEP, reviewed continued use of ice especially with increase time spent in standing. Stressed importance of decreasing swelling to improve knee ROM and quad activation. 6/11 Educated on 25% weight bearing and proper sequencing during gait. 5/27 Updated HEP and reviewed precuations. 5/25 Reviewed appropriate knee flexion ROM and proper fit of TROM. 5/19 Educated on precautions with knee flexion ROM and to not push through pain but work on gentle ROM.  Educated on continued use of ice to decrease swelling and for pain control as she progresses off of pain medication   5/12 Educated on importance of ice/elevation to decrease swelling as well as proper stair negotiation with crutches and non weight bearing on R. Also reviewed importance of consistency with HEP. HEP instruction:   Access Code: Chago Grant: [email or web address]  Date: 05/12/2021  Prepared by: Maricarmen Kirkland         PLAN: See eval  [x] Continue per plan of care [] Ashley Del Angel current plan (see comments above)  [x] Plan of care initiated [] Hold pending MD visit [] Discharge  7/23: Progress with strengthening as tolerated and continue to monitor proper gait sequencing. Electronically signed by:  Nick Wells PT    Note: If patient does not return for scheduled/ recommended follow up visits, this note will serve as a discharge from care along with most recent update on progress.

## 2021-07-27 ENCOUNTER — HOSPITAL ENCOUNTER (OUTPATIENT)
Dept: PHYSICAL THERAPY | Age: 31
Setting detail: THERAPIES SERIES
Discharge: HOME | End: 2021-07-27
Payer: COMMERCIAL

## 2021-07-27 PROCEDURE — 97110 THERAPEUTIC EXERCISES: CPT

## 2021-07-27 PROCEDURE — 97530 THERAPEUTIC ACTIVITIES: CPT

## 2021-07-27 PROCEDURE — 97112 NEUROMUSCULAR REEDUCATION: CPT

## 2021-07-27 NOTE — FLOWSHEET NOTE
The [address] [place] [address],[address], [address], [address] [place] [address]  Phone: [phone]   Fax:     [phone]    Physical Therapy Progress/Daily Treatment Note  Date:  2021    Patient Name:  Mona Modi    :  1990  MRN: [id number]  Restrictions/Precautions:    Medical/Treatment Diagnosis Information:  · Diagnosis: Q13.633T (ICD-10-CM) - Rupture of anterior cruciate ligament of right knee, initial encounter  · Treatment Diagnosis: M25.561 Right knee pain  Insurance/Certification information:  PT Insurance Information: [employer]  Physician Information:  Referring Practitioner: Joyce Lyon MD  Has the plan of care been signed (Y/N):        [x]  Yes  []  No     Date of Patient follow up with Physician:       Is this a Progress Report:     []  Yes  []  No        If Yes:  Date Range for reporting period:  Beginning 21  Ending 21    Progress report will be due (10 Rx or 30 days whichever is less): 77      Recertification will be due (POC Duration  / 90 days whichever is less): 21         Visit # Insurance Allowable Auth Required   4  4 of 10 visits  15 visits total [employer]   10 visits  -  12 visits eval included end date 21 [x]  Yes []  No        Functional Scale:   LEFS 59% deficit Date assessed: 21  LEFS 65% deficit Date assessed: 21  LEFS 89% deficit Date assessed:  21       Latex Allergy:  [x]NO      []YES  Preferred Language for Healthcare:   [x]English       []other:    Pain level:  0/10     SUBJECTIVE:   Pt states overall she is doing well and feels like she walks better without her J brace. Walks around house without brace but continues to use with community ambulation. Has not noticed any squeak in R knee but has noitced slight crepitus. No longer using crutch.      OBJECTIVE: Updated below on 21  ROM PROM AROM Overpressure Comment     L R   L R  L R     Flexion   147 heel slide 153 143      Audible squeak at R knee with flexion AROM, denies any pain    Extension   0 0 0          Strength L R Comment   Quad 5 4-     Hamstring 5 4     Hip  flexion  5 4     Hip abd  5 4+           Girth L R   Mid Patella 34 cm 34.5 cm     Gait: Unilateral crutch on L with proper sequencing noted. Gait without crutch demonstrated decrease weight bearing on L with mild circumduction. Incisions: Incision closed with no s/s of infection   Palpation: No tendnerness   RESTRICTIONS/PRECAUTIONS: S/P R ACL with BPTB graft, medial and lateral meniscus tear 5/12/21    Exercises/Interventions:   Exercise/Equipment Resistance/Repetitions Other comments   Stretching/AROM       Ankle pumps 5/19 cont with HEP    Bike x5' AROM  7/2 able to complete full revolutions    Calf stretch 30\"hx3 R ^6/15 slant board with protected weight bearing, bilateral axillary crutches.     Hamstring  30\"hx3 R 5/25 Seated EOT, 1/2 roll under ankle    Heel slides 10\"hx10 R7/2 reintroduced d/t decrease in squeak   SB roll ins 6/29 withheld d/t audible squeak at patellofemoral joint   Knee flexion PROM  6/29 completed with assist from L LE at EOT   Hip flexor stretch  7/2   Heel raises  6/15 at table with bilateral UE support   Strengthening      SLR 10# 3x10 R ^7/23   SLR abd 10#  3x15 R ^7/23   SLR add 10# 3x10 R ^7/23   SLR ext 10# 3x10 R ^723   SLR + 30\"hx5 R ^7/13 2.5#    LAQ with BS 6.5# 3x10 R ^7/27 90<>30 deg   Hamstring curl 6.5# 3x10 R ^7/27 standing   SL bridge 7/27 withheld d/t time, cont with HEP   Clams 7/27 withheld d/t time, cont with HEP    Neuromuscular re-ed      Quad sets  7/6 reintroduced d/t decrease VMO activation from swelling   Adduction isometric  5/19   Biodex weight bearing ^6/29    Cone walking 6/15 cues for proper knee flexion during swing phase and quad activation during stance phase    SLS  30\"hx5 ^7/27 airex   Marching  7/13   Step up 3x10 R 7/27 6\" step, bilateral up support   Wall sits 30\"hx5 ^7/23 Knees bent at 45 deg   Quantum machines       Leg press  80# 3x10 eccentric lower on R ^7/27   Leg extension 20# 3x10 bilat 7/23 90<>45 deg   Leg curl 30# 3x10 bilat 7/23 10<>45    CC TKE 5 plates [id number] R ^6/80   Manual interventions                Mario ADAME 7/16 7/23 Worked on proper gait sequencing with emphasis on knee flexion during swing phase without assistive device     Blood Flow Restriction Training  Smart Cuff Size: 4  LOP: 212  PTP (60-80% of LOP): 170 (80%)  Exercise Repetition Weight Repetitions                                      Therapeutic Exercise and NMR EXR  [x] (95872) Provided verbal/tactile cueing for activities related to strengthening, flexibility, endurance, ROM for improvements in LE, proximal hip, and core control with self care, mobility, lifting, ambulation.  [] (27775) Provided verbal/tactile cueing for activities related to improving balance, coordination, kinesthetic sense, posture, motor skill, proprioception  to assist with LE, proximal hip, and core control in self care, mobility, lifting, ambulation and eccentric single leg control.      NMR and Therapeutic Activities:    [] (96643 or 85716) Provided verbal/tactile cueing for activities related to improving balance, coordination, kinesthetic sense, posture, motor skill, proprioception and motor activation to allow for proper function of core, proximal hip and LE with self care and ADLs  [] (31803) Gait Re-education- Provided training and instruction to the patient for proper LE, core and proximal hip recruitment and positioning and eccentric body weight control with ambulation re-education including up and down stairs     Home Exercise Program:    [x] (49989) Reviewed/Progressed HEP activities related to strengthening, flexibility, endurance, ROM of core, proximal hip and LE for functional self-care, mobility, lifting and ambulation/stair navigation   [] (93731)Reviewed/Progressed HEP activities related to improving balance, coordination, kinesthetic sense, posture, motor skill, proprioception of core, proximal hip and LE for self care, mobility, lifting, and ambulation/stair navigation      Manual Treatments:  PROM / STM / Oscillations-Mobs:  G-I, II, III, IV (PA's, Inf., Post.)  [] (69259) Provided manual therapy to mobilize LE, proximal hip and/or LS spine soft tissue/joints for the purpose of modulating pain, promoting relaxation,  increasing ROM, reducing/eliminating soft tissue swelling/inflammation/restriction, improving soft tissue extensibility and allowing for proper ROM for normal function with self care, mobility, lifting and ambulation. Modalities:  CP R knee x15' 7/27    Charges:  Timed Code Treatment Minutes: 62'   Total Treatment Minutes: 11:40-1:02  82'       [] EVAL (LOW) 04851 (typically 20 minutes face-to-face)  [] EVAL (MOD) 08374 (typically 30 minutes face-to-face)  [] EVAL (HIGH) 34083 (typically 45 minutes face-to-face)  [] RE-EVAL   [x] OZ(88629) x 2    [] IONTO  [x] NMR (69597) x 1    [] VASO  [] Manual (62990) x      [] Other:  [x] TA x 1     [] Mech Traction (64683)  [] ES(attended) (70595)      [] ES (un) (56873):     GOALS:   Patient stated goal: Return to [employer]    [x]? Progressing: []? Met: []? Not Met: []? Adjusted     Therapist goals for Patient:   Short Term Goals: To be achieved in: 8 weeks  1. Independent in HEP and progression per patient tolerance, in order to prevent re-injury. [x]? Progressing: []? Met: []? Not Met: []? Adjusted   2. Patient will have a decrease in pain to facilitate improvement in movement, function, and ADLs as indicated by Functional Deficits. []? Progressing: [x]? Met: []? Not Met: []? Adjusted     Long Term Goals: To be achieved in: 16 weeks  1. Disability index score of 20% or less for the LEFS to assist with reaching prior level of function. [x]? Progressing: []? Met: []? Not Met: []? Adjusted  2.  Patient will demonstrate increased R knee AROM that is equal to L to allow for proper joint functioning as indicated by patients Functional Deficits. [x]? Progressing: []? Met: []? Not Met: []? Adjusted  3. Patient will demonstrate an increase in R knee strength that is equal to L good proximal hip strength and control  in LE to allow for proper functional mobility as indicated by patients Functional Deficits. [x]? Progressing: []? Met: []? Not Met: []? Adjusted  4. Patient will return to all functional activities without increased symptoms or restriction. [x]? Progressing: []? Met: []? Not Met: []? Adjusted  5. Patient will complete Biodex Isokinetic strength with 25% or less deficit of quadriceps and hamsrting strength compared to L as well as PKTQ/BW of 40% or greater of quadriceps and 30% of hamstring on L in order to begin return to running progression  [x]? Progressing: []? Met: []? Not Met: []? Adjusted         Overall Progression Towards Functional goals/ Treatment Progress Update:  [] Patient is progressing as expected towards functional goals listed with increase strength and ROM that is appropriate based on MD orders. [x] Progression is slowed due to complexities of surgery as well as audible squeaking at patellofemoral joint and mild drainage at distal incision. Incision has healed and sound at patellofemarl joint is improving however pt is continuing to progress off of assistive device d/t early MD precautions. Based on surgical intervention, objective deficits and MD protocol she is appropriate for continued PT to further increase strength and ROM allowing for protection of surgical intervention as well as increase in functional independence 7/13/21  [] Progression has been slowed due to co-morbidities.   [] Plan just implemented, too soon to assess goals progression <30days   [] Goals require adjustment due to lack of progress  [] Patient is not progressing as expected and requires additional follow up with physician  [] Other    Prognosis

## 2021-07-30 ENCOUNTER — HOSPITAL ENCOUNTER (OUTPATIENT)
Dept: PHYSICAL THERAPY | Age: 31
Setting detail: THERAPIES SERIES
Discharge: HOME | End: 2021-07-30
Payer: COMMERCIAL

## 2021-07-30 PROCEDURE — 97530 THERAPEUTIC ACTIVITIES: CPT

## 2021-07-30 PROCEDURE — 97110 THERAPEUTIC EXERCISES: CPT

## 2021-07-30 PROCEDURE — 97112 NEUROMUSCULAR REEDUCATION: CPT

## 2021-07-30 NOTE — FLOWSHEET NOTE
The [address] [place] [address] 200, [address], [address] [place] [address]  Phone: [phone]   Fax:     [phone]    Physical Therapy Progress/Daily Treatment Note  Date:  2021    Patient Name:  Shane Scherer    :  1990  MRN: [id number]  Restrictions/Precautions:    Medical/Treatment Diagnosis Information:  · Diagnosis: R59.587D (ICD-10-CM) - Rupture of anterior cruciate ligament of right knee, initial encounter  · Treatment Diagnosis: M25.561 Right knee pain  Insurance/Certification information:  PT Insurance Information: [employer]  Physician Information:  Referring Practitioner: Ottoniel Woodall MD  Has the plan of care been signed (Y/N):        [x]  Yes  []  No     Date of Patient follow up with Physician:       Is this a Progress Report:     []  Yes  [x]  No        If Yes:  Date Range for reporting period:  Beginning 21  Ending 21    Progress report will be due (10 Rx or 30 days whichever is less): 88      Recertification will be due (POC Duration  / 90 days whichever is less): 21         Visit # Insurance Allowable Auth Required   5  5 of 10 visits  16 visits total [employer]   10 visits  -  12 visits eval included end date 21 [x]  Yes []  No        Functional Scale:   LEFS 59% deficit Date assessed: 21  LEFS 65% deficit Date assessed: 21  LEFS 89% deficit Date assessed:  21       Latex Allergy:  [x]NO      []YES  Preferred Language for Healthcare:   [x]English       []other:    Pain level:  0/10     SUBJECTIVE:   Pt reports overall doing well and has been negotiating stairs without issues.  Reports having some tenderness at distal incision and believes a stitch is coming out but no active exudate or s/s of infection noted    OBJECTIVE: Updated below on 21  ROM PROM AROM Overpressure Comment     L R   L R  L R     Flexion   147 heel slide 153 143      Audible squeak at R knee required   Wall sits  7/30 progressed with TRX squats   Quantum machines       Leg press  90# 3x10 eccentric lower on R ^7/30   Leg extension 20# 3x10 bilat 7/23 90<>45 deg   Leg curl 30# 3x10 bilat 7/23 10<>45    CC TKE 6 plates 5U89 R ^3/04   Manual interventions                Mario  R 7/16 7/23 Worked on proper gait sequencing with emphasis on knee flexion during swing phase without assistive device     Blood Flow Restriction Training  Smart Cuff Size: 4  LOP: 212  PTP (60-80% of LOP): 170 (80%)  Exercise Repetition Weight Repetitions                                      Therapeutic Exercise and NMR EXR  [x] (58892) Provided verbal/tactile cueing for activities related to strengthening, flexibility, endurance, ROM for improvements in LE, proximal hip, and core control with self care, mobility, lifting, ambulation.  [] (83649) Provided verbal/tactile cueing for activities related to improving balance, coordination, kinesthetic sense, posture, motor skill, proprioception  to assist with LE, proximal hip, and core control in self care, mobility, lifting, ambulation and eccentric single leg control.      NMR and Therapeutic Activities:    [] (61953 or 01534) Provided verbal/tactile cueing for activities related to improving balance, coordination, kinesthetic sense, posture, motor skill, proprioception and motor activation to allow for proper function of core, proximal hip and LE with self care and ADLs  [] (52759) Gait Re-education- Provided training and instruction to the patient for proper LE, core and proximal hip recruitment and positioning and eccentric body weight control with ambulation re-education including up and down stairs     Home Exercise Program:    [x] (94162) Reviewed/Progressed HEP activities related to strengthening, flexibility, endurance, ROM of core, proximal hip and LE for functional self-care, mobility, lifting and ambulation/stair navigation   [] (93769)Reviewed/Progressed HEP activities related to improving balance, coordination, kinesthetic sense, posture, motor skill, proprioception of core, proximal hip and LE for self care, mobility, lifting, and ambulation/stair navigation      Manual Treatments:  PROM / STM / Oscillations-Mobs:  G-I, II, III, IV (PA's, Inf., Post.)  [] (68549) Provided manual therapy to mobilize LE, proximal hip and/or LS spine soft tissue/joints for the purpose of modulating pain, promoting relaxation,  increasing ROM, reducing/eliminating soft tissue swelling/inflammation/restriction, improving soft tissue extensibility and allowing for proper ROM for normal function with self care, mobility, lifting and ambulation. Modalities:  CP R knee x15' 7/30    Charges:  Timed Code Treatment Minutes: 54'   Total Treatment Minutes: 9:08-10:25  77'       [] EVAL (LOW) 79652 (typically 20 minutes face-to-face)  [] EVAL (MOD) 97139 (typically 30 minutes face-to-face)  [] EVAL (HIGH) 61788 (typically 45 minutes face-to-face)  [] RE-EVAL   [x] NG(80366) x 2    [] IONTO  [x] NMR (96689) x 1    [] VASO  [] Manual (67510) x      [] Other:  [x] TA x 1     [] Mech Traction (10648)  [] ES(attended) (82648)      [] ES (un) (08199):     GOALS:   Patient stated goal: Return to [employer]    [x]? Progressing: []? Met: []? Not Met: []? Adjusted     Therapist goals for Patient:   Short Term Goals: To be achieved in: 8 weeks  1. Independent in HEP and progression per patient tolerance, in order to prevent re-injury. [x]? Progressing: []? Met: []? Not Met: []? Adjusted   2. Patient will have a decrease in pain to facilitate improvement in movement, function, and ADLs as indicated by Functional Deficits. []? Progressing: [x]? Met: []? Not Met: []? Adjusted     Long Term Goals: To be achieved in: 16 weeks  1. Disability index score of 20% or less for the LEFS to assist with reaching prior level of function. [x]? Progressing: []? Met: []? Not Met: []? Adjusted  2.  Patient will demonstrate increased R knee AROM that is equal to L to allow for proper joint functioning as indicated by patients Functional Deficits. [x]? Progressing: []? Met: []? Not Met: []? Adjusted  3. Patient will demonstrate an increase in R knee strength that is equal to L good proximal hip strength and control  in LE to allow for proper functional mobility as indicated by patients Functional Deficits. [x]? Progressing: []? Met: []? Not Met: []? Adjusted  4. Patient will return to all functional activities without increased symptoms or restriction. [x]? Progressing: []? Met: []? Not Met: []? Adjusted  5. Patient will complete Biodex Isokinetic strength with 25% or less deficit of quadriceps and hamsrting strength compared to L as well as PKTQ/BW of 40% or greater of quadriceps and 30% of hamstring on L in order to begin return to running progression  [x]? Progressing: []? Met: []? Not Met: []? Adjusted         Overall Progression Towards Functional goals/ Treatment Progress Update:  [] Patient is progressing as expected towards functional goals listed with increase strength and ROM that is appropriate based on MD orders. [x] Progression is slowed due to complexities of surgery as well as audible squeaking at patellofemoral joint and mild drainage at distal incision. Incision has healed and sound at patellofemarl joint is improving however pt is continuing to progress off of assistive device d/t early MD precautions. Based on surgical intervention, objective deficits and MD protocol she is appropriate for continued PT to further increase strength and ROM allowing for protection of surgical intervention as well as increase in functional independence 7/13/21  [] Progression has been slowed due to co-morbidities.   [] Plan just implemented, too soon to assess goals progression <30days   [] Goals require adjustment due to lack of progress  [] Patient is not progressing as expected and requires additional follow up with physician  [] Other    Prognosis for POC: [x] Good [] Fair  [] Poor      Patient requires continued skilled intervention: [x] Yes  [] No    Treatment/Activity Tolerance:  [x] Patient able to complete treatment  [] Patient limited by fatigue  [] Patient limited by some pain     [] Patient limited by other medical complications  [x] Other: 7/30 Responded well to increase in intensity of treatment with no audible noises from knee and no c/o pain. Decrease in cues required for proper knee flexion during swing phase of gait       Patient Education:           7/23 Reviewed proper gait sequencing and activity modification with walking around neighborhood to decrease risk of increase in symptoms. Updated HEP based on tolerance to treatment. 7/16 educated on decreasing ambulation with no AD and to revert to 1 crutch around the house if symptoms in R knee increase or increased antalgic gait with no AD.  7/13 Educated on proper use of unilateral crutch for community ambulation but to attempt ambulating around house without AD as tolerated. 7/6 Educated on activity modification in order to decrease swelling and improve quad activation. Pt to ambulate with unilateral crutch as tolerated. 6/29 Increased weight with HEP. Educated on self patellar mobilizations for home based on MD orders  6/23 Updated HEP, reviewed continued use of ice especially with increase time spent in standing. Stressed importance of decreasing swelling to improve knee ROM and quad activation. 6/11 Educated on 25% weight bearing and proper sequencing during gait. 5/27 Updated HEP and reviewed precuations. 5/25 Reviewed appropriate knee flexion ROM and proper fit of TROM. 5/19 Educated on precautions with knee flexion ROM and to not push through pain but work on gentle ROM.  Educated on continued use of ice to decrease swelling and for pain control as she progresses off of pain medication   5/12 Educated on importance of ice/elevation to decrease swelling as well as proper stair negotiation with crutches and non weight bearing on R. Also reviewed importance of consistency with HEP. HEP instruction:   Access Code: Obie Barrios: [email or web address]  Date: 05/12/2021  Prepared by: Jose G Pereira         PLAN: See eval  [x] Continue per plan of care [] Richard Fails current plan (see comments above)  [x] Plan of care initiated [] Hold pending MD visit [] Discharge  7/30: Progress with strengthening as tolerated and continue to monitor proper gait sequencing. Electronically signed by:  Antoinette Denney PT    Note: If patient does not return for scheduled/ recommended follow up visits, this note will serve as a discharge from care along with most recent update on progress.

## 2021-08-02 ENCOUNTER — HOSPITAL ENCOUNTER (OUTPATIENT)
Dept: PHYSICAL THERAPY | Age: 31
Setting detail: THERAPIES SERIES
Discharge: HOME | End: 2021-08-02
Payer: COMMERCIAL

## 2021-08-02 PROCEDURE — 97110 THERAPEUTIC EXERCISES: CPT

## 2021-08-02 PROCEDURE — 97530 THERAPEUTIC ACTIVITIES: CPT

## 2021-08-02 PROCEDURE — 97112 NEUROMUSCULAR REEDUCATION: CPT

## 2021-08-02 NOTE — FLOWSHEET NOTE
The [address] [place] [address],[address], [address], [address] [place] [address]  Phone: [phone]   Fax:     [phone]    Physical Therapy Progress/Daily Treatment Note  Date:  2021    Patient Name:  Reji Malone    :  1990  MRN: [id number]  Restrictions/Precautions:    Medical/Treatment Diagnosis Information:  · Diagnosis: T82.903C (ICD-10-CM) - Rupture of anterior cruciate ligament of right knee, initial encounter  · Treatment Diagnosis: M25.561 Right knee pain  Insurance/Certification information:  PT Insurance Information: [place]  Physician Information:  Referring Practitioner: Charli Ashton MD  Has the plan of care been signed (Y/N):        [x]  Yes  []  No     Date of Patient follow up with Physician:       Is this a Progress Report:     []  Yes  [x]  No        If Yes:  Date Range for reporting period:  Beginning 21  Ending 21    Progress report will be due (10 Rx or 30 days whichever is less):       Recertification will be due (POC Duration  / 90 days whichever is less): 21         Visit # Insurance Allowable Auth Required   6  6 of 10 visits  17 visits total [place]   10 visits  -  12 visits eval included end date 21 [x]  Yes []  No        Functional Scale:   LEFS 59% deficit Date assessed: 21  LEFS 65% deficit Date assessed: 21  LEFS 89% deficit Date assessed:  21       Latex Allergy:  [x]NO      []YES  Preferred Language for Healthcare:   [x]English       []other:    Pain level:  0/10     SUBJECTIVE:   Pt reports has been practicing go up stairs with reciprocal pattern and walking around the house without a limp. Continues to go down stairs one at a time. Overall doing well.      OBJECTIVE: Updated below on 21  ROM PROM AROM Overpressure Comment     L R   L R  L R     Flexion   147 heel slide 153 143      Audible squeak at R knee with flexion AROM, denies any pain Extension   0 0 0          Strength L R Comment   Quad 5 4-     Hamstring 5 4     Hip  flexion  5 4     Hip abd  5 4+           Girth L R   Mid Patella 34 cm 34.5 cm     Gait: Unilateral crutch on L with proper sequencing noted. Gait without crutch demonstrated decrease weight bearing on L with mild circumduction. Incisions: Incision closed with no s/s of infection   Palpation: No tendnerness   RESTRICTIONS/PRECAUTIONS: S/P R ACL with BPTB graft, medial and lateral meniscus tear 5/12/21    Exercises/Interventions:   Exercise/Equipment Resistance/Repetitions Other comments   Stretching/AROM       Ankle pumps 5/19 cont with HEP    Bike x5' AROM  7/2 able to complete full revolutions    Calf stretch 30\"hx3 R ^6/15 slant board with protected weight bearing, bilateral axillary crutches.     Hamstring  30\"hx3 R 5/25 Seated EOT, 1/2 roll under ankle    Prone quad strertch 30\"hx3 R7/30   Heel slides 7/2 reintroduced d/t decrease in squeak   SB roll ins 6/29 withheld d/t audible squeak at patellofemoral joint   Knee flexion PROM  6/29 completed with assist from L LE at EOT   Hip flexor stretch  7/2   Heel raises  6/15 at table with bilateral UE support   Strengthening      SLR 10# 3x10 R ^7/23   SLR abd 10#  3x15 R ^7/23   SLR add 10# 3x10 R ^7/23   SLR ext 10# 3x10 R ^723   SLR + 30\"hx5 R ^8/2 3#    LAQ with BS 8# 3x10 R ^8/2 90<>30 deg   Hamstring curl 8# 3x10 R ^8/2 standing   SL bridge 3x10 R 7/30   Clams Lal loop 3x10 R ^7/30   Neuromuscular re-ed      Quad sets  7/6 reintroduced d/t decrease VMO activation from swelling   Adduction isometric  5/19   Biodex weight bearing ^6/29    Cone walking 6/15 cues for proper knee flexion during swing phase and quad activation during stance phase    Reverse lunge with slider 3x10 R 8/2 cues for proper sequencing    SLS  30\"hx5 ^8/2 Postural stability L11 on airex   Marching  7/13   Step up 3x10 R forward  3x10 R lateral 8/2 8\" step  8/2 6\" step    TRX squats 20\"hx5 ^8/2 Gaona [place] sits  7/30 progressed with TRX squats   Quantum machines       Leg press  100# 3x10 eccentric lower on R ^8/2   Leg extension 15# 3x10 DL concentric, SL eccentric on R ^8/2 90<>45 deg   Leg curl 25# 3x10 DL concenctric, SL eccentric on R ^8/2 10<>45    CC TKE 7 plates 6N23 R ^9/1   Manual interventions                Mario ADAME 7/16 7/23 Worked on proper gait sequencing with emphasis on knee flexion during swing phase without assistive device     Blood Flow Restriction Training  Smart Cuff Size: 4  LOP: 212  PTP (60-80% of LOP): 170 (80%)  Exercise Repetition Weight Repetitions                                      Therapeutic Exercise and NMR EXR  [x] (75309) Provided verbal/tactile cueing for activities related to strengthening, flexibility, endurance, ROM for improvements in LE, proximal hip, and core control with self care, mobility, lifting, ambulation.  [] (25283) Provided verbal/tactile cueing for activities related to improving balance, coordination, kinesthetic sense, posture, motor skill, proprioception  to assist with LE, proximal hip, and core control in self care, mobility, lifting, ambulation and eccentric single leg control.      NMR and Therapeutic Activities:    [] (41010 or 31550) Provided verbal/tactile cueing for activities related to improving balance, coordination, kinesthetic sense, posture, motor skill, proprioception and motor activation to allow for proper function of core, proximal hip and LE with self care and ADLs  [] (94547) Gait Re-education- Provided training and instruction to the patient for proper LE, core and proximal hip recruitment and positioning and eccentric body weight control with ambulation re-education including up and down stairs     Home Exercise Program:    [x] (46879) Reviewed/Progressed HEP activities related to strengthening, flexibility, endurance, ROM of core, proximal hip and LE for functional self-care, mobility, lifting and ambulation/stair navigation [] (38668)Reviewed/Progressed HEP activities related to improving balance, coordination, kinesthetic sense, posture, motor skill, proprioception of core, proximal hip and LE for self care, mobility, lifting, and ambulation/stair navigation      Manual Treatments:  PROM / STM / Oscillations-Mobs:  G-I, II, III, IV (PA's, Inf., Post.)  [] (73971) Provided manual therapy to mobilize LE, proximal hip and/or LS spine soft tissue/joints for the purpose of modulating pain, promoting relaxation,  increasing ROM, reducing/eliminating soft tissue swelling/inflammation/restriction, improving soft tissue extensibility and allowing for proper ROM for normal function with self care, mobility, lifting and ambulation. Modalities:  CP R knee x15' 8/2    Charges:  Timed Code Treatment Minutes: 48'   Total Treatment Minutes: 9:57-11:15  66'       [] EVAL (LOW) 56315 (typically 20 minutes face-to-face)  [] EVAL (MOD) 92170 (typically 30 minutes face-to-face)  [] EVAL (HIGH) 33206 (typically 45 minutes face-to-face)  [] RE-EVAL   [x] EI(93957) x 2    [] IONTO  [x] NMR (19354) x 1    [] VASO  [] Manual (06857) x      [] Other:  [x] TA x 1     [] Mech Traction (39596)  [] ES(attended) (30210)      [] ES (un) (83686):     GOALS:   Patient stated goal: Return to [employer]    [x]? Progressing: []? Met: []? Not Met: []? Adjusted     Therapist goals for Patient:   Short Term Goals: To be achieved in: 8 weeks  1. Independent in HEP and progression per patient tolerance, in order to prevent re-injury. [x]? Progressing: []? Met: []? Not Met: []? Adjusted   2. Patient will have a decrease in pain to facilitate improvement in movement, function, and ADLs as indicated by Functional Deficits. []? Progressing: [x]? Met: []? Not Met: []? Adjusted     Long Term Goals: To be achieved in: 16 weeks  1. Disability index score of 20% or less for the LEFS to assist with reaching prior level of function. [x]? Progressing: []? Met: []?  Not Met: []? Adjusted  2. Patient will demonstrate increased R knee AROM that is equal to L to allow for proper joint functioning as indicated by patients Functional Deficits. [x]? Progressing: []? Met: []? Not Met: []? Adjusted  3. Patient will demonstrate an increase in R knee strength that is equal to L good proximal hip strength and control  in LE to allow for proper functional mobility as indicated by patients Functional Deficits. [x]? Progressing: []? Met: []? Not Met: []? Adjusted  4. Patient will return to all functional activities without increased symptoms or restriction. [x]? Progressing: []? Met: []? Not Met: []? Adjusted  5. Patient will complete Biodex Isokinetic strength with 25% or less deficit of quadriceps and hamsrting strength compared to L as well as PKTQ/BW of 40% or greater of quadriceps and 30% of hamstring on L in order to begin return to running progression  [x]? Progressing: []? Met: []? Not Met: []? Adjusted         Overall Progression Towards Functional goals/ Treatment Progress Update:  [] Patient is progressing as expected towards functional goals listed with increase strength and ROM that is appropriate based on MD orders. [x] Progression is slowed due to complexities of surgery as well as audible squeaking at patellofemoral joint and mild drainage at distal incision. Incision has healed and sound at patellofemarl joint is improving however pt is continuing to progress off of assistive device d/t early MD precautions. Based on surgical intervention, objective deficits and MD protocol she is appropriate for continued PT to further increase strength and ROM allowing for protection of surgical intervention as well as increase in functional independence 7/13/21  [] Progression has been slowed due to co-morbidities.   [] Plan just implemented, too soon to assess goals progression <30days   [] Goals require adjustment due to lack of progress  [] Patient is not progressing as expected and with knee flexion ROM and to not push through pain but work on gentle ROM. Educated on continued use of ice to decrease swelling and for pain control as she progresses off of pain medication   5/12 Educated on importance of ice/elevation to decrease swelling as well as proper stair negotiation with crutches and non weight bearing on R. Also reviewed importance of consistency with HEP. HEP instruction:   Access Code: Paulina Albright: [email or web address]  Date: 05/12/2021  Prepared by: Gris Flower         PLAN: See eval  [x] Continue per plan of care [] Alter current plan (see comments above)  [x] Plan of care initiated [] Hold pending MD visit [] Discharge  8/2: Progress with strengthening as tolerated and continue to monitor proper gait sequencing. Electronically signed by:  Emilie Ayers PT    Note: If patient does not return for scheduled/ recommended follow up visits, this note will serve as a discharge from care along with most recent update on progress.

## 2021-08-03 ENCOUNTER — HOSPITAL ENCOUNTER (OUTPATIENT)
Dept: PHYSICAL THERAPY | Age: 31
Setting detail: THERAPIES SERIES
Discharge: HOME | End: 2021-08-03
Payer: COMMERCIAL

## 2021-08-03 PROCEDURE — 97112 NEUROMUSCULAR REEDUCATION: CPT

## 2021-08-03 PROCEDURE — 97110 THERAPEUTIC EXERCISES: CPT

## 2021-08-03 PROCEDURE — 97530 THERAPEUTIC ACTIVITIES: CPT

## 2021-08-03 NOTE — FLOWSHEET NOTE
[facility], [address] [place] [address], [address] [place] [address]  Phone: [phone]   Fax:     [phone]    Physical Therapy Progress/Daily Treatment Note  Date:  8/3/2021    Patient Name:  Sourav Kim    :  1990  MRN: [id number]  Restrictions/Precautions:    Medical/Treatment Diagnosis Information:  · Diagnosis: F87.806U (ICD-10-CM) - Rupture of anterior cruciate ligament of right knee, initial encounter  · Treatment Diagnosis: M25.561 Right knee pain  Insurance/Certification information:  PT Insurance Information: [place]  Physician Information:  Referring Practitioner: Kimberly Claudio MD  Has the plan of care been signed (Y/N):        [x]  Yes  []  No     Date of Patient follow up with Physician:       Is this a Progress Report:     []  Yes  [x]  No        If Yes:  Date Range for reporting period:  Beginning 21  Ending 21    Progress report will be due (10 Rx or 30 days whichever is less):       Recertification will be due (POC Duration  / 90 days whichever is less): 21         Visit # Insurance Allowable Auth Required   7  7 of 10 visits  17 visits total [place]   10 visits  -  12 visits eval included end date 21 [x]  Yes []  No        Functional Scale:   LEFS 59% deficit Date assessed: 21  LEFS 65% deficit Date assessed: 21  LEFS 89% deficit Date assessed:  21       Latex Allergy:  [x]NO      []YES  Preferred Language for Healthcare:   [x]English       []other:    Pain level:  0/10 8/3    SUBJECTIVE:  8/3 Pt denies any issues after last visit. Was able to negotiate stairs yesterday and some times it is harder then others. Still feels some things moving around in R knee but denies audible noise or pain.      OBJECTIVE: Updated below on 21  ROM PROM AROM Overpressure Comment     L R   L R  L R     Flexion   147 heel slide 153 143      Audible squeak at R knee with flexion AROM, denies any pain    Extension   0 0 0          Strength L R Comment   Quad 5 4-     Hamstring 5 4     Hip  flexion  5 4     Hip abd  5 4+           Girth L R   Mid Patella 34 cm 34.5 cm     Gait: Unilateral crutch on L with proper sequencing noted. Gait without crutch demonstrated decrease weight bearing on L with mild circumduction. Incisions: Incision closed with no s/s of infection   Palpation: No tendnerness   RESTRICTIONS/PRECAUTIONS: S/P R ACL with BPTB graft, medial and lateral meniscus tear 5/12/21    Exercises/Interventions:   Exercise/Equipment Resistance/Repetitions Other comments   Stretching/AROM       Ankle pumps 5/19 cont with HEP    Bike x5' AROM  7/2 able to complete full revolutions    Calf stretch 30\"hx3 R ^6/15 slant board with protected weight bearing, bilateral axillary crutches.     Hamstring  30\"hx3 R 5/25 Seated EOT, 1/2 roll under ankle    Prone quad strertch 30\"hx3 R7/30   Heel slides 7/2 reintroduced d/t decrease in squeak   SB roll ins 6/29 withheld d/t audible squeak at patellofemoral joint   Knee flexion PROM  6/29 completed with assist from L LE at EOT   Hip flexor stretch  7/2   Heel raises  6/15 at table with bilateral UE support   Strengthening      SLR 10# 3x10 R ^7/23   SLR abd 10#  3x15 R ^7/23   SLR add 10# 3x10 R ^7/23   SLR ext 10# 3x10 R ^723   SLR + 30\"hx5 R ^8/2 3#    LAQ with BS 8# 3x10 R ^8/2 90<>30 deg   Hamstring curl 8# 3x10 R ^8/2 standing   SL bridge 3x10 R 7/30   Clams Lal loop 3x10 R ^7/30   Neuromuscular re-ed      Quad sets  7/6 reintroduced d/t decrease VMO activation from swelling   Adduction isometric  5/19   Biodex weight bearing ^6/29    Cone walking 6/15 cues for proper knee flexion during swing phase and quad activation during stance phase    Reverse lunge with slider 3x10 R 8/2 cues for proper sequencing    SL closed chain hamstring 3x10 R 8/3   SLS  30\"hx5 ^8/2 Postural stability L11 on airex   Marching  7/13   Step up 3x10 R forward  3x10 R lateral 8/2 8\" step  8/2 6\" step    TRX squats 20\"hx5 ^8/2   Plyo toss trampoline  4# med ball tandem stance 2x10 ea  8/3   Wall sits  7/30 progressed with TRX squats   Quantum machines       Leg press  100# 3x10 eccentric lower on R ^8/2   Leg extension 15# 3x10 DL concentric, SL eccentric on R ^8/2 90<>45 deg   Leg curl 25# 3x10 DL concenctric, SL eccentric on R ^8/2 10<>45    CC TKE 7 plates 5S47 R ^1/2   Manual interventions                Garrett  R 7/16       Blood Flow Restriction Training  Smart Cuff Size: 4  LOP: 212  PTP (60-80% of LOP): 170 (80%)  Exercise Repetition Weight Repetitions                                      Therapeutic Exercise and NMR EXR  [x] (15996) Provided verbal/tactile cueing for activities related to strengthening, flexibility, endurance, ROM for improvements in LE, proximal hip, and core control with self care, mobility, lifting, ambulation.  [] (46491) Provided verbal/tactile cueing for activities related to improving balance, coordination, kinesthetic sense, posture, motor skill, proprioception  to assist with LE, proximal hip, and core control in self care, mobility, lifting, ambulation and eccentric single leg control.      NMR and Therapeutic Activities:    [] (71429 or 61222) Provided verbal/tactile cueing for activities related to improving balance, coordination, kinesthetic sense, posture, motor skill, proprioception and motor activation to allow for proper function of core, proximal hip and LE with self care and ADLs  [] (76340) Gait Re-education- Provided training and instruction to the patient for proper LE, core and proximal hip recruitment and positioning and eccentric body weight control with ambulation re-education including up and down stairs     Home Exercise Program:    [x] (20982) Reviewed/Progressed HEP activities related to strengthening, flexibility, endurance, ROM of core, proximal hip and LE for functional self-care, mobility, lifting and ambulation/stair navigation   [] (48258)Reviewed/Progressed HEP activities related to improving balance, coordination, kinesthetic sense, posture, motor skill, proprioception of core, proximal hip and LE for self care, mobility, lifting, and ambulation/stair navigation      Manual Treatments:  PROM / STM / Oscillations-Mobs:  G-I, II, III, IV (PA's, Inf., Post.)  [] (95199) Provided manual therapy to mobilize LE, proximal hip and/or LS spine soft tissue/joints for the purpose of modulating pain, promoting relaxation,  increasing ROM, reducing/eliminating soft tissue swelling/inflammation/restriction, improving soft tissue extensibility and allowing for proper ROM for normal function with self care, mobility, lifting and ambulation. Modalities:  CP R knee x15' 8/3    Charges:  Timed Code Treatment Minutes: 48'   Total Treatment Minutes: 9:26-10:50  80'       [] EVAL (LOW) 24302 (typically 20 minutes face-to-face)  [] EVAL (MOD) 66088 (typically 30 minutes face-to-face)  [] EVAL (HIGH) 95645 (typically 45 minutes face-to-face)  [] RE-EVAL   [x] EG(32472) x 2    [] IONTO  [x] NMR (67013) x 1    [] VASO  [] Manual (82610) x      [] Other:  [x] TA x 1     [] Mech Traction (48206)  [] ES(attended) (59251)      [] ES (un) (80443):     GOALS:   Patient stated goal: Return to [employer]    [x]? Progressing: []? Met: []? Not Met: []? Adjusted     Therapist goals for Patient:   Short Term Goals: To be achieved in: 8 weeks  1. Independent in HEP and progression per patient tolerance, in order to prevent re-injury. [x]? Progressing: []? Met: []? Not Met: []? Adjusted   2. Patient will have a decrease in pain to facilitate improvement in movement, function, and ADLs as indicated by Functional Deficits. []? Progressing: [x]? Met: []? Not Met: []? Adjusted     Long Term Goals: To be achieved in: 16 weeks  1. Disability index score of 20% or less for the LEFS to assist with reaching prior level of function. [x]? Progressing: []?  Met: []? Not Met: []? Adjusted  2. Patient will demonstrate increased R knee AROM that is equal to L to allow for proper joint functioning as indicated by patients Functional Deficits. [x]? Progressing: []? Met: []? Not Met: []? Adjusted  3. Patient will demonstrate an increase in R knee strength that is equal to L good proximal hip strength and control  in LE to allow for proper functional mobility as indicated by patients Functional Deficits. [x]? Progressing: []? Met: []? Not Met: []? Adjusted  4. Patient will return to all functional activities without increased symptoms or restriction. [x]? Progressing: []? Met: []? Not Met: []? Adjusted  5. Patient will complete Biodex Isokinetic strength with 25% or less deficit of quadriceps and hamsrting strength compared to L as well as PKTQ/BW of 40% or greater of quadriceps and 30% of hamstring on L in order to begin return to running progression  [x]? Progressing: []? Met: []? Not Met: []? Adjusted         Overall Progression Towards Functional goals/ Treatment Progress Update:  [] Patient is progressing as expected towards functional goals listed with increase strength and ROM that is appropriate based on MD orders. [x] Progression is slowed due to complexities of surgery as well as audible squeaking at patellofemoral joint and mild drainage at distal incision. Incision has healed and sound at patellofemarl joint is improving however pt is continuing to progress off of assistive device d/t early MD precautions. Based on surgical intervention, objective deficits and MD protocol she is appropriate for continued PT to further increase strength and ROM allowing for protection of surgical intervention as well as increase in functional independence 7/13/21  [] Progression has been slowed due to co-morbidities.   [] Plan just implemented, too soon to assess goals progression <30days   [] Goals require adjustment due to lack of progress  [] Patient is not progressing as expected and requires additional follow up with physician  [] Other    Prognosis for POC: [x] Good [] Fair  [] Poor      Patient requires continued skilled intervention: [x] Yes  [] No    Treatment/Activity Tolerance:  [x] Patient able to complete treatment  [] Patient limited by fatigue  [] Patient limited by some pain     [] Patient limited by other medical complications  [x] Other: 8/3 Responded well to treatment with appropriate fatigue. Overall continuing to improve with decrease in cues required for proper form and sequencing with gait. Patient Education:           8/2 Updated HEP   7/23 Reviewed proper gait sequencing and activity modification with walking around neighborhood to decrease risk of increase in symptoms. Updated HEP based on tolerance to treatment. 7/16 educated on decreasing ambulation with no AD and to revert to 1 crutch around the house if symptoms in R knee increase or increased antalgic gait with no AD.  7/13 Educated on proper use of unilateral crutch for community ambulation but to attempt ambulating around house without AD as tolerated. 7/6 Educated on activity modification in order to decrease swelling and improve quad activation. Pt to ambulate with unilateral crutch as tolerated. 6/29 Increased weight with HEP. Educated on self patellar mobilizations for home based on MD orders  6/23 Updated HEP, reviewed continued use of ice especially with increase time spent in standing. Stressed importance of decreasing swelling to improve knee ROM and quad activation. 6/11 Educated on 25% weight bearing and proper sequencing during gait. 5/27 Updated HEP and reviewed precuations. 5/25 Reviewed appropriate knee flexion ROM and proper fit of TROM. 5/19 Educated on precautions with knee flexion ROM and to not push through pain but work on gentle ROM.  Educated on continued use of ice to decrease swelling and for pain control as she progresses off of pain medication   5/12 Educated on importance of ice/elevation to decrease swelling as well as proper stair negotiation with crutches and non weight bearing on R. Also reviewed importance of consistency with HEP. HEP instruction:   Access Code: Nelda Gallagher: [email or web address]  Date: 05/12/2021  Prepared by: Juan Clifford         PLAN: See eval  [x] Continue per plan of care [] Alter current plan (see comments above)  [x] Plan of care initiated [] Hold pending MD visit [] Discharge  8/3: Complete progress note NPV. Progress with strengthening as tolerated and continue to monitor proper gait sequencing. Electronically signed by:  Jaskaran Ceron PT    Note: If patient does not return for scheduled/ recommended follow up visits, this note will serve as a discharge from care along with most recent update on progress.

## 2021-08-06 ENCOUNTER — OFFICE VISIT (OUTPATIENT)
Dept: ORTHOPEDIC SURGERY | Age: 31
End: 2021-08-06

## 2021-08-06 VITALS — WEIGHT: 150 LBS | BODY MASS INDEX: 26.58 KG/M2 | HEIGHT: 63 IN | TEMPERATURE: 98 F

## 2021-08-06 DIAGNOSIS — Z98.890 S/P LATERAL MENISCUS REPAIR OF RIGHT KNEE: ICD-10-CM

## 2021-08-06 DIAGNOSIS — Z98.890 S/P MEDIAL MENISCUS REPAIR OF RIGHT KNEE: ICD-10-CM

## 2021-08-06 DIAGNOSIS — Z98.890 S/P ACL RECONSTRUCTION: Primary | ICD-10-CM

## 2021-08-06 PROCEDURE — 99024 POSTOP FOLLOW-UP VISIT: CPT | Performed by: ORTHOPAEDIC SURGERY

## 2021-08-06 NOTE — PROGRESS NOTES
Chief Complaint  Follow-up (s/p ACL reconstruction right knee)      History of Present Illness:  Radha Maciel is a pleasant 32 y.o. female who presents today for follow up evaluation of right knee pain. She is 3 months status post right knee arthroscopy, ACL reconstruction with patellar tendon autograft, medial meniscal repair, lateral meniscus repair on 5/11/2021. She has been compliant with post operative physical therapy. She has also been using a J brace and is no longer experiencing any squeaking. She states she is feeling great and continues to work on strengthening. No new injuries reported. Medical History:  Patient's medications, allergies, past medical, surgical, social and family histories were reviewed and updated as appropriate. Pertinent items are noted in HPI  Review of systems reviewed from Patient History Form completed today and available in the patient's chart under the Media tab. Pain Assessment  Location of Pain: Knee  Location Modifiers: Left  Severity of Pain: 0  Frequency of Pain: Intermittent  Aggravating Factors: Stairs  Relieving Factors: Rest, Ice, Nsaids  Result of Injury: Yes  Work-Related Injury: No  Are there other pain locations you wish to document?: No    Past Medical History:   Diagnosis Date    Uncomplicated asthma         Past Surgical History:   Procedure Laterality Date    ANTERIOR CRUCIATE LIGAMENT REPAIR Right 5/11/2021    RIGHT KNEE ARTHROSOCPY, ANTERIOR CRUCIATE LIGAMENT RECONSTRUCTION PATELLA TENDON, MEDIAL AND LATERAL MENISCUS REPAIR performed by Megha Hameed MD at [address] [place] [address] Right     orf right wrist        History reviewed. No pertinent family history.     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[address] [employer] Smoking status: Never Smoker    Smokeless tobacco: Never Used   Vaping Use    Vaping Use: Never used   Substance and Sexual Activity    Alcohol use: Yes     Alcohol/week: 1.0 standard drinks     Types: 1 Glasses of wine per week     Comment: 2-3 a week     Drug use: Not Currently    Sexual activity: None   Other Topics Concern    None   Social History Narrative    None     Social Determinants of Health     Financial Resource Strain:     Difficulty of Paying Living Expenses:    Food Insecurity:     Worried About Running Out of Food in the Last Year:     Ran Out of Food in the Last Year:    Transportation Needs:     Lack of Transportation (Medical):  Lack of Transportation (Non-Medical):    Physical Activity:     Days of Exercise per Week:     Minutes of Exercise per Session:    Stress:     Feeling of Stress :    Social Connections:     Frequency of Communication with Friends and Family:     Frequency of Social Gatherings with Friends and Family:     Attends Holiness Services:     Active Member of Clubs or Organizations:     Attends Club or Organization Meetings:     Marital Status:    Intimate Partner Violence:     Fear of Current or Ex-Partner:     Emotionally Abused:     Physically Abused:     Sexually Abused:        Current Outpatient Medications   Medication Sig Dispense Refill    ondansetron (ZOFRAN) 4 MG tablet Take 1 tablet by mouth every 8 hours as needed for Nausea or Vomiting 30 tablet 0    senna-docusate (PERICOLACE) 8.6-50 MG per tablet Take 2 tablets by mouth daily as needed for Constipation 30 tablet 0    aspirin 325 MG EC tablet Take 1 tablet by mouth daily for 21 days 21 tablet 0     No current facility-administered medications for this visit.        Allergies   Allergen Reactions    Amoxicillin      Hives    Hydrocodone      Facial rash       Vital signs:  Temp 98 °F (36.7 °C)   Ht 5' 3\" (1.6 m)   Wt 150 lb (68 kg)   BMI 26.57 kg/m²            Right knee exam     Gait: Crutch assisted gait     Alignment: normal alignment.     Inspection/skin: Skin is intact without erythema or ecchymosis. No gross deformity. Healed surgical incisions.     Palpation: Patellofemoral crepitus.  No joint line tenderness present.     Range of Motion: Able to flex past 90 degrees.      Strength: Quadriceps weakness.      Effusion: No effusion or swelling present.      Ligamentous stability: No cruciate or collateral ligament instability.      Neurologic and vascular: Skin is warm and well-perfused. Sensation is intact to light-touch.      Special tests: Negative Kristine sign.         Left knee exam     Gait: No use of assistive devices. No antalgic gait.     Alignment: normal alignment.     Inspection/skin: Skin is intact without erythema or ecchymosis. No gross deformity.      Palpation: no crepitus. no joint line tenderness present.     Range of Motion: There is full range of motion.      Strength: Normal quadriceps development.      Effusion: No effusion or swelling present.      Ligamentous stability: No cruciate or collateral ligament instability.      Neurologic and vascular: Skin is warm and well-perfused. Sensation is intact to light-touch.      Special tests: Negative Kristine sign. Radiology:     Pertinent imaging was interpreted and reviewed with the patient. No new imaging was obtained during today's visit. Assessment :  3 months status post right knee arthroscopy, ACL reconstruction with BTB autograft, medial lateral meniscus repairs on 5/11/2021    Impression:  Encounter Diagnoses   Name Primary?  S/P ACL reconstruction Yes    S/P medial meniscus repair of right knee     S/P lateral meniscus repair of right knee        Office Procedures:  No orders of the defined types were placed in this encounter. Plan: Pertinent imaging was reviewed. The etiology, natural history, and treatment options for the disorder were discussed.   The roles of activity medication, antiinflammatories, injections, bracing, physical therapy, and surgical interventions were all described to the patient and questions were answered. She is trending in a good direction but continues to have significant quad weakness. I recommend she continue post operative physical therapy for strengthening. 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 She is able to return to sedentary work only but if sedentary work is not an option, I do not feel she is strong enough at this time to perform the duties required for her job. I will see her back in 1 month for reevaluation, sooner if needed. Lorraine Phelps is in agreement with this plan. All questions were answered to patient's satisfaction and was encouraged to call with any further questions. I, Ricardo Bridges ATC, am scribing for and in the presence of Dr. Giuliano Son. 08/06/21 10:55 AM Ricardo Bridges ATC    I attest that I met personally with the patient, performed the described exam, reviewed the radiographic studies and medical records associated with this patient and supervised the services that are described above.      Omar Marie MD

## 2021-08-25 ENCOUNTER — HOSPITAL ENCOUNTER (OUTPATIENT)
Dept: PHYSICAL THERAPY | Age: 31
Setting detail: THERAPIES SERIES
Discharge: HOME | End: 2021-08-25
Payer: COMMERCIAL

## 2021-08-25 PROCEDURE — 97530 THERAPEUTIC ACTIVITIES: CPT

## 2021-08-25 PROCEDURE — 97110 THERAPEUTIC EXERCISES: CPT

## 2021-08-25 PROCEDURE — 97112 NEUROMUSCULAR REEDUCATION: CPT

## 2021-08-25 NOTE — FLOWSHEET NOTE
[facility] 66, [address] [employer] [address] [place] [address], [address] [place] [address]  Phone: [phone]   Fax:     [phone]    Physical Therapy Progress/Daily Treatment Note  Date:  2021    Patient Name:  Justine Arana    :  1990  MRN: [id number]  Restrictions/Precautions:    Medical/Treatment Diagnosis Information:  · Diagnosis: U14.637U (ICD-10-CM) - Rupture of anterior cruciate ligament of right knee, initial encounter  · Treatment Diagnosis: M25.561 Right knee pain  Insurance/Certification information:  PT Insurance Information: [employer]  Physician Information:  Referring Practitioner: Gayathri Worley MD  Has the plan of care been signed (Y/N):        [x]  Yes  []  No     Date of Patient follow up with Physician:       Is this a Progress Report:     [x]  Yes  [x]  No        If Yes:  Date Range for reporting period:  Beginning 21  Ending 21    Progress report will be due (10 Rx or 30 days whichever is less): 56      Recertification will be due (POC Duration  / 90 days whichever is less): 21         Visit # Insurance Allowable Auth Required   9  9 of 10 visits  20 visits total [employer]   10 visits  -  12 visits eval included end date 21 [x]  Yes []  No        Functional Scale:   LEFS 45% deficit Date assessed: 21  LEFS 59% deficit Date assessed: 21  LEFS 65% deficit Date assessed: 21  LEFS 89% deficit Date assessed:  21       Latex Allergy:  [x]NO      []YES  Preferred Language for Healthcare:   [x]English       []other:    Pain level:  0/10     SUBJECTIVE:  : Pt reports overall knee is doing well but does report having patella pain on R with knee extension weight machine single leg. Also having anterior knee pressure with wall sits.      OBJECTIVE: Updated below on 21  ROM PROM AROM Overpressure Comment     L R   L R  L R     Flexion   148  153 148        Extension   0 +5 0     trampoline   8/3   Wall sits 30\"hx3 bilat 8/25 75 deg knee flexion, withheld SL d/t knee pain   Quantum machines       Leg press  60# 3x10 SL R  100# 3x10 eccentric lower on R ^8/25   Leg extension 20# 3x10 DL concentric ^8/25 90<>45 deg,    Leg curl 15# 3x10 SL R ^8/18 10<>45    CC TKE 7 plates 6C78 R ^2/0   Manual interventions                [facility] 7/16       Blood Flow Restriction Training  Smart Cuff Size: 4  LOP: 212  PTP (60-80% of LOP): 170 (80%)  Exercise Repetition Weight Repetitions                                      Therapeutic Exercise and NMR EXR  [x] (16620) Provided verbal/tactile cueing for activities related to strengthening, flexibility, endurance, ROM for improvements in LE, proximal hip, and core control with self care, mobility, lifting, ambulation.  [] (07350) Provided verbal/tactile cueing for activities related to improving balance, coordination, kinesthetic sense, posture, motor skill, proprioception  to assist with LE, proximal hip, and core control in self care, mobility, lifting, ambulation and eccentric single leg control.      NMR and Therapeutic Activities:    [] (01045 or 98613) Provided verbal/tactile cueing for activities related to improving balance, coordination, kinesthetic sense, posture, motor skill, proprioception and motor activation to allow for proper function of core, proximal hip and LE with self care and ADLs  [] (46864) Gait Re-education- Provided training and instruction to the patient for proper LE, core and proximal hip recruitment and positioning and eccentric body weight control with ambulation re-education including up and down stairs     Home Exercise Program:    [x] (88375) Reviewed/Progressed HEP activities related to strengthening, flexibility, endurance, ROM of core, proximal hip and LE for functional self-care, mobility, lifting and ambulation/stair navigation   [] (15984)Reviewed/Progressed HEP activities related to improving balance, coordination, kinesthetic sense, posture, motor skill, proprioception of core, proximal hip and LE for self care, mobility, lifting, and ambulation/stair navigation      Manual Treatments:  PROM / STM / Oscillations-Mobs:  G-I, II, III, IV (PA's, Inf., Post.)  [] (01127) Provided manual therapy to mobilize LE, proximal hip and/or LS spine soft tissue/joints for the purpose of modulating pain, promoting relaxation,  increasing ROM, reducing/eliminating soft tissue swelling/inflammation/restriction, improving soft tissue extensibility and allowing for proper ROM for normal function with self care, mobility, lifting and ambulation. Modalities:  CP R knee x15' 8/25    Charges:  Timed Code Treatment Minutes: 72'   Total Treatment Minutes: 1:43-3:29  106'       [] EVAL (LOW) 10816 (typically 20 minutes face-to-face)  [] EVAL (MOD) 55494 (typically 30 minutes face-to-face)  [] EVAL (HIGH) 33026 (typically 45 minutes face-to-face)  [] RE-EVAL   [x] FZ(12333) x 2    [] IONTO  [x] NMR (47901) x 1    [] VASO  [] Manual (61614) x      [] Other:  [x] TA x 1     [] Mech Traction (73648)  [] ES(attended) (78278)      [] ES (un) (28650):     GOALS:   Patient stated goal: Return to [employer]    [x]? Progressing: []? Met: []? Not Met: []? Adjusted     Therapist goals for Patient:   Short Term Goals: To be achieved in: 8 weeks  1. Independent in HEP and progression per patient tolerance, in order to prevent re-injury. []? Progressing: [x]? Met: []? Not Met: []? Adjusted   2. Patient will have a decrease in pain to facilitate improvement in movement, function, and ADLs as indicated by Functional Deficits. []? Progressing: [x]? Met: []? Not Met: []? Adjusted     Long Term Goals: To be achieved in: 16 weeks  1. Disability index score of 20% or less for the LEFS to assist with reaching prior level of function. [x]? Progressing: []? Met: []? Not Met: []? Adjusted  2.  Patient will demonstrate increased R knee AROM that is equal to L to allow for proper joint functioning as indicated by patients Functional Deficits. []? Progressing: [x]? Met: []? Not Met: []? Adjusted  3. Patient will demonstrate an increase in R knee strength that is equal to L good proximal hip strength and control  in LE to allow for proper functional mobility as indicated by patients Functional Deficits. [x]? Progressing: []? Met: []? Not Met: []? Adjusted  4. Patient will return to all functional activities without increased symptoms or restriction. [x]? Progressing: []? Met: []? Not Met: []? Adjusted  5. Patient will complete [employer] Isokinetic strength with 25% or less deficit of quadriceps and hamsrting strength compared to L as well as PKTQ/BW of 40% or greater of quadriceps and 30% of hamstring on L in order to begin return to running progression  [x]? Progressing: []? Met: []? Not Met: []? Adjusted         Overall Progression Towards Functional goals/ Treatment Progress Update:  [] Patient is progressing as expected towards functional goals listed with increase strength and ROM that is appropriate based on MD orders. [x] Progression overall has been slow d/t complexities associated with surgery but patient appears to be back on track with increase in strength, ROM and overall function. Based on surgical intervention, objective deficits and MD protocol she is appropriate for continued PT to further increase strength and stability allowing for protection of surgical intervention as well as increase in functional independence in order to safely return to work 8/18/21  [] Progression has been slowed due to co-morbidities.   [] Plan just implemented, too soon to assess goals progression <30days   [] Goals require adjustment due to lack of progress  [] Patient is not progressing as expected and requires additional follow up with physician  [] Other    Prognosis for POC: [x] Good [] Fair  [] Poor      Patient requires continued skilled intervention: [x] Yes  [] No    Treatment/Activity Tolerance:  [x] Patient able to complete treatment  [] Patient limited by fatigue  [] Patient limited by some pain     [] Patient limited by other medical complications  [x] Other: 8/25 Pt presented with improved tolerance to treatment. Continues to have quad atrophy and fatigue causing some anterior discomfort that improves with decreasing intensity and ROM. Improved control noted with SL squat using TRX. Patient Education:           8/25 Updated HEP based on treatment  8/18 Updated HEP based on tolerance to treatment  8/2 Updated HEP   7/23 Reviewed proper gait sequencing and activity modification with walking around neighborhood to decrease risk of increase in symptoms. Updated HEP based on tolerance to treatment. 7/16 educated on decreasing ambulation with no AD and to revert to 1 crutch around the house if symptoms in R knee increase or increased antalgic gait with no AD.  7/13 Educated on proper use of unilateral crutch for community ambulation but to attempt ambulating around house without AD as tolerated. 7/6 Educated on activity modification in order to decrease swelling and improve quad activation. Pt to ambulate with unilateral crutch as tolerated. 6/29 Increased weight with HEP. Educated on self patellar mobilizations for home based on MD orders  6/23 Updated HEP, reviewed continued use of ice especially with increase time spent in standing. Stressed importance of decreasing swelling to improve knee ROM and quad activation. 6/11 Educated on 25% weight bearing and proper sequencing during gait. 5/27 Updated HEP and reviewed precuations. 5/25 Reviewed appropriate knee flexion ROM and proper fit of TROM. 5/19 Educated on precautions with knee flexion ROM and to not push through pain but work on gentle ROM.  Educated on continued use of ice to decrease swelling and for pain control as she progresses off of pain medication   5/12 Educated on importance of ice/elevation to decrease swelling as well as proper stair negotiation with crutches and non weight bearing on R. Also reviewed importance of consistency with HEP. HEP instruction:   Access Code: Rolando Course: [facility] [email or web address]/  Date: 05/12/2021  Prepared by: Georgia Perez         PLAN: See eval  [x] Continue per plan of care [] Alter current plan (see comments above)  [x] Plan of care initiated [] Hold pending MD visit [] Discharge  8/25 Progress with strengthening and stability as tolerated based on MD protocol. Electronically signed by:  Lisa Peng PT    Note: If patient does not return for scheduled/ recommended follow up visits, this note will serve as a discharge from care along with most recent update on progress.

## 2021-08-31 ENCOUNTER — HOSPITAL ENCOUNTER (OUTPATIENT)
Dept: PHYSICAL THERAPY | Age: 31
Setting detail: THERAPIES SERIES
Discharge: HOME | End: 2021-08-31
Payer: COMMERCIAL

## 2021-08-31 PROCEDURE — 97110 THERAPEUTIC EXERCISES: CPT

## 2021-08-31 PROCEDURE — 97112 NEUROMUSCULAR REEDUCATION: CPT

## 2021-08-31 PROCEDURE — 97530 THERAPEUTIC ACTIVITIES: CPT

## 2021-08-31 NOTE — PROGRESS NOTES
The [address] [place] [address],[address], 693 [facility] [address], [address] [place] [address]  Phone: [phone]   Fax:     [phone]   Physical Therapy Re-Certification Plan of Care    Dear Dr. Rabia Duran,    We had the pleasure of treating the following patient for physical therapy services at [address] [place] [address]. A summary of our findings can be found in the updated assessment below. This includes our plan of care. If you have any questions or concerns regarding these findings, please do not hesitate to contact me at the office phone number checked above. Thank you for the referral.     Physician Signature:________________________________Date:__________________  By signing above (or electronic signature), therapists plan is approved by physician      Overall Response to Treatment:   []Patient is responding well to treatment and improvement is noted with regards    []Patient should continue to improve in reasonable time if they continue HEP   []Patient has plateaued and is no longer responding to skilled PT intervention    []Patient is getting worse and would benefit from return to referring MD   []Patient unable to adhere to initial POC   [x] Patient is responding well to treatment with increase in R knee ROM and strength allowing for overall improvement in function and decrease in pain. D/t complexity of surgery, concern of potential infection to incision site and auidible squeak that caused pause in strengthening, her overall progress has been slow and is appropriate for continued PT to improve strength and stability.      Physical Therapy Progress/Daily Treatment Note  Date:  2021    Patient Name:  Alexsander Washington    :  1990  MRN: [id number]  Restrictions/Precautions:    Medical/Treatment Diagnosis Information:  · Diagnosis: K98.916I (ICD-10-CM) - Rupture of anterior cruciate ligament of right knee, initial RESTRICTIONS/PRECAUTIONS: S/P R ACL with BPTB graft, medial and lateral meniscus tear 5/12/21    Exercises/Interventions:   Exercise/Equipment Resistance/Repetitions Other comments   Stretching/AROM       Ankle pumps 5/19 cont with HEP    Bike x5' AROM  7/2 able to complete full revolutions    Calf stretch 30\"hx3 R ^6/15 slant board with protected weight bearing, bilateral axillary crutches.     Hamstring  30\"hx3 R 5/25 Seated EOT, 1/2 roll under ankle    Prone quad strertch 30\"hx3 R7/30   Heel slides 7/2 reintroduced d/t decrease in squeak   SB roll ins 6/29 withheld d/t audible squeak at patellofemoral joint   Knee flexion PROM  6/29 completed with assist from L LE at EOT   Hip flexor stretch  7/2   Heel raises  6/15 at table with bilateral UE support   Strengthening      SLR 12# 3x10 R ^7/23   SLR abd 12#  3x15 R ^7/23   SLR add 12# 3x10 R ^7/23   SLR ext 12# 3x10 R ^   SLR + 30\"hx3 R ^8/31 4#   LAQ with BS 9# 3x10 R ^8/31 90<>10 deg decreased weight   Hamstring curl ^8/2 standing   SL bridge 7/30   Clams  ^7/30   Neuromuscular re-ed      Quad sets  7/6 reintroduced d/t decrease VMO activation from swelling   Adduction isometric  5/19   Biodex weight bearing ^6/29    Cone walking 6/15 cues for proper knee flexion during swing phase and quad activation during stance phase    8/18 progressed with SL quarter squat    8/18 completed at table with bilateral UE support and limited ROM    TRX  SL squat 3x10 8/25 toe tap on R stopped at 45 deg flexion   SL closed chain hamstring 3x10 R ^8/25 bilat 5# UE   SLS  30\"hx5 ^8/31 Postural stability L9 on airex   Marching  7/13   Step up 3x10 R forward  3x10 R lateral 8/25 8\" step 5# bilat UE  ^8/31 8\" step 5# bilat UE   Plyo toss trampoline   8/3   Wall sits 30\"hx3 bilat 8/25 75 deg knee flexion, withheld SL d/t knee pain   Quantum machines       Leg press  70# 1x10, 65# 2x10 SL R  100# 3x10 eccentric lower on R ^8/31 decreased weight with SL squat d/t fatigue and mild pressure at anterior knee   Leg extension 20# 3x10 DL concentric ^8/25 90<>45 deg,    Leg curl 20# 3x10 SL R ^8/31 10<>45    CC TKE 7 plates 6K35 R ^1/3   Manual interventions                Mario ADAME 7/16       Blood Flow Restriction Training  Smart Cuff Size: 4  LOP: 212  PTP (60-80% of LOP): 170 (80%)  Exercise Repetition Weight Repetitions                                      Therapeutic Exercise and NMR EXR  [x] (01641) Provided verbal/tactile cueing for activities related to strengthening, flexibility, endurance, ROM for improvements in LE, proximal hip, and core control with self care, mobility, lifting, ambulation.  [] (60356) Provided verbal/tactile cueing for activities related to improving balance, coordination, kinesthetic sense, posture, motor skill, proprioception  to assist with LE, proximal hip, and core control in self care, mobility, lifting, ambulation and eccentric single leg control.      NMR and Therapeutic Activities:    [] (07457 or 26296) Provided verbal/tactile cueing for activities related to improving balance, coordination, kinesthetic sense, posture, motor skill, proprioception and motor activation to allow for proper function of core, proximal hip and LE with self care and ADLs  [] (14813) Gait Re-education- Provided training and instruction to the patient for proper LE, core and proximal hip recruitment and positioning and eccentric body weight control with ambulation re-education including up and down stairs     Home Exercise Program:    [x] (37511) Reviewed/Progressed HEP activities related to strengthening, flexibility, endurance, ROM of core, proximal hip and LE for functional self-care, mobility, lifting and ambulation/stair navigation   [] (67192)Reviewed/Progressed HEP activities related to improving balance, coordination, kinesthetic sense, posture, motor skill, proprioception of core, proximal hip and LE for self care, mobility, lifting, and ambulation/stair navigation Manual Treatments:  PROM / STM / Oscillations-Mobs:  G-I, II, III, IV (PA's, Inf., Post.)  [] (77176) Provided manual therapy to mobilize LE, proximal hip and/or LS spine soft tissue/joints for the purpose of modulating pain, promoting relaxation,  increasing ROM, reducing/eliminating soft tissue swelling/inflammation/restriction, improving soft tissue extensibility and allowing for proper ROM for normal function with self care, mobility, lifting and ambulation. Modalities:  CP R knee x15' 8/31    Charges:  Timed Code Treatment Minutes: 54'   Total Treatment Minutes: 10:58-12:20  82'       [] EVAL (LOW) 17279 (typically 20 minutes face-to-face)  [] EVAL (MOD) 35394 (typically 30 minutes face-to-face)  [] EVAL (HIGH) 90761 (typically 45 minutes face-to-face)  [] RE-EVAL   [x] BY(42924) x 2    [] IONTO  [x] NMR (62851) x 1    [] VASO  [] Manual (86204) x      [] Other:  [x] TA x 1     [] Mech Traction (02071)  [] ES(attended) (91350)      [] ES (un) (33678):     GOALS:   Patient stated goal: Return to [employer]    [x]? Progressing: []? Met: []? Not Met: []? Adjusted     Therapist goals for Patient:   Short Term Goals: To be achieved in: 8 weeks  1. Independent in HEP and progression per patient tolerance, in order to prevent re-injury. []? Progressing: [x]? Met: []? Not Met: []? Adjusted   2. Patient will have a decrease in pain to facilitate improvement in movement, function, and ADLs as indicated by Functional Deficits. []? Progressing: [x]? Met: []? Not Met: []? Adjusted     Long Term Goals: To be achieved in: 16 weeks  1. Disability index score of 20% or less for the LEFS to assist with reaching prior level of function. [x]? Progressing: []? Met: []? Not Met: []? Adjusted  2. Patient will demonstrate increased R knee AROM that is equal to L to allow for proper joint functioning as indicated by patients Functional Deficits. []? Progressing: [x]? Met: []? Not Met: []? Adjusted  3.  Patient will demonstrate an increase in R knee strength that is equal to L good proximal hip strength and control  in LE to allow for proper functional mobility as indicated by patients Functional Deficits. [x]? Progressing: []? Met: []? Not Met: []? Adjusted  4. Patient will return to all functional activities without increased symptoms or restriction. [x]? Progressing: []? Met: []? Not Met: []? Adjusted  5. Patient will complete Biodex Isokinetic strength with 25% or less deficit of quadriceps and hamsrting strength compared to L as well as PKTQ/BW of 40% or greater of quadriceps and 30% of hamstring on L in order to begin return to running progression  []? Progressing: []? Met: []? Not Met: [x]? Adjusted     Have not yet tested d/t slower progression and will be appropriate to test in one month based on current progression    Overall Progression Towards Functional goals/ Treatment Progress Update:  [] Patient is progressing as expected towards functional goals listed with increase strength and ROM that is appropriate based on MD orders. [x] Progression overall has been slow d/t complexities associated with surgery but patient appears to be back on track with increase in strength, ROM and overall function. Based on surgical intervention, objective deficits and MD protocol she is appropriate for continued PT to further increase strength and stability allowing for protection of surgical intervention as well as increase in functional independence in order to safely return to work 8/31/21  [] Progression has been slowed due to co-morbidities.   [] Plan just implemented, too soon to assess goals progression <30days   [] Goals require adjustment due to lack of progress  [] Patient is not progressing as expected and requires additional follow up with physician  [] Other    Prognosis for POC: [x] Good [] Fair  [] Poor      Patient requires continued skilled intervention: [x] Yes  [] No    Treatment/Activity Tolerance:  [x] Patient able to complete treatment  [] Patient limited by fatigue  [] Patient limited by some pain     [] Patient limited by other medical complications  [x] Other: 8/31 Pt responded well to increase in intensity of treatment and presented with increase in R LE strength and knee stability. She presented with appropriate fatigue by end of treatment but no adverse effects noted or reported      Patient Education:           8/25 Updated HEP based on treatment  8/18 Updated HEP based on tolerance to treatment  8/2 Updated HEP   7/23 Reviewed proper gait sequencing and activity modification with walking around neighborhood to decrease risk of increase in symptoms. Updated HEP based on tolerance to treatment. 7/16 educated on decreasing ambulation with no AD and to revert to 1 crutch around the house if symptoms in R knee increase or increased antalgic gait with no AD.  7/13 Educated on proper use of unilateral crutch for community ambulation but to attempt ambulating around house without AD as tolerated. 7/6 Educated on activity modification in order to decrease swelling and improve quad activation. Pt to ambulate with unilateral crutch as tolerated. 6/29 Increased weight with HEP. Educated on self patellar mobilizations for home based on MD orders  6/23 Updated HEP, reviewed continued use of ice especially with increase time spent in standing. Stressed importance of decreasing swelling to improve knee ROM and quad activation. 6/11 Educated on 25% weight bearing and proper sequencing during gait. 5/27 Updated HEP and reviewed precuations. 5/25 Reviewed appropriate knee flexion ROM and proper fit of TROM. 5/19 Educated on precautions with knee flexion ROM and to not push through pain but work on gentle ROM.  Educated on continued use of ice to decrease swelling and for pain control as she progresses off of pain medication   5/12 Educated on importance of ice/elevation to decrease swelling as well as proper stair negotiation with crutches and non weight bearing on R. Also reviewed importance of consistency with HEP. HEP instruction:   Access Code: Vijay Bears: [email or web address]  Date: 05/12/2021  Prepared by: Teresa Rodriguez         PLAN: See eval  [x] Continue per plan of care [] Tiki Urbano current plan (see comments above)  [x] Plan of care initiated [] Hold pending MD visit [] Discharge  8/31 Pt appropriate for continued PT to further increase strength and knee stability in order to ensure safe return to work. Electronically signed by:  Shellie Escamilla, PT    Note: If patient does not return for scheduled/ recommended follow up visits, this note will serve as a discharge from care along with most recent update on progress.

## 2021-09-09 ENCOUNTER — HOSPITAL ENCOUNTER (OUTPATIENT)
Dept: PHYSICAL THERAPY | Age: 31
Setting detail: THERAPIES SERIES
End: 2021-09-09
Payer: COMMERCIAL

## 2021-09-10 ENCOUNTER — APPOINTMENT (OUTPATIENT)
Dept: PHYSICAL THERAPY | Age: 31
End: 2021-09-10
Payer: COMMERCIAL

## 2021-09-10 ENCOUNTER — OFFICE VISIT (OUTPATIENT)
Dept: ORTHOPEDIC SURGERY | Age: 31
End: 2021-09-10
Payer: COMMERCIAL

## 2021-09-10 VITALS — HEIGHT: 63 IN | WEIGHT: 150 LBS | TEMPERATURE: 97.3 F | BODY MASS INDEX: 26.58 KG/M2

## 2021-09-10 DIAGNOSIS — Z98.890 STATUS POST RECONSTRUCTION OF ANTERIOR CRUCIATE LIGAMENT: Primary | ICD-10-CM

## 2021-09-10 PROCEDURE — 99214 OFFICE O/P EST MOD 30 MIN: CPT | Performed by: PHYSICIAN ASSISTANT

## 2021-09-10 NOTE — PROGRESS NOTES
Chief Complaint  Follow-up (right knee. s/p 4 months acl )      History of Present Illness:  Diana Deras is a pleasant 32 y.o. female here for follow-up regarding her right knee. She has 4 months status post right arthroscopy with ACL reconstruction with patellar tendon autograft medial meniscus repair lateral meniscus repair on 5/11/2021. Admittedly patient's recovery has been somewhat slower than usual but she continues to improve week by week. She has been compliant with postoperative physical therapy. She has been using a J brace and though she still has some crepitus she does not have any more squeaking. 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 Medical History:  Patient's medications, allergies, past medical, surgical, social and family histories were reviewed and updated as appropriate. Pain Assessment  Location of Pain: Knee  Location Modifiers: Right  Severity of Pain: 1  Quality of Pain: Aching  Frequency of Pain: Intermittent  Aggravating Factors: Stairs  Limiting Behavior: Some  Relieving Factors: Rest  Result of Injury: Yes  Work-Related Injury: No  Are there other pain locations you wish to document?: No  ROS: Review of systems reviewed from Patient History Form completed today and available in the patient's chart under the Media tab. Pertinent items are noted in HPI  Review of systems reviewed from Patient History Form completed today and available in the patient's chart under the Media tab. Vital Signs:  Temp 97.3 °F (36.3 °C)   Ht 5' 3\" (1.6 m)   Wt 150 lb (68 kg)   BMI 26.57 kg/m²       Right knee examination:    Gait: No use of assistive devices. No antalgic gait. Alignment: normal alignment. Inspection/skin: Healed surgical incision    Palpation: Crepitus    Range of Motion: There is full range of motion.      Strength: Weak quadriceps    Effusion: No effusion or swelling present. Ligamentous stability: No cruciate or collateral ligament instability. Neurologic and vascular: Skin is warm and well-perfused. Sensation is intact to light-touch. Special tests: Negative Kristine sign. Left knee examination:    Gait: No use of assistive devices. No antalgic gait. Alignment: normal alignment. Inspection/skin: Skin is intact without erythema or ecchymosis. No gross deformity. Palpation: mild crepitus. no joint line tenderness present. Range of Motion: There is full range of motion. Strength: Normal quadriceps development. Effusion: No effusion or swelling present. Ligamentous stability: No cruciate or collateral ligament instability. Neurologic and vascular: Skin is warm and well-perfused. Sensation is intact to light-touch. Special tests: Negative Kristine sign. Radiology:       Pertinent imaging was interpreted and reviewed with the patient today, images only - no report available. No new imaging was obtained during today's visit. Assessment : 77-year-old female 4 months status post right ACL reconstruction with patella tendon autograft medial meniscus repair lateral meniscus repair on 5/11/2021. Impression:  Encounter Diagnosis   Name Primary?  Status post reconstruction of anterior cruciate ligament Yes       Office Procedures:  No orders of the defined types were placed in this encounter. Plan: Pertinent imaging was reviewed. The etiology, natural history, and treatment options for the disorder were discussed. The roles of activity medication, antiinflammatories, injections, bracing, physical therapy, and surgical interventions were all described to the patient and questions were answered. Patient is doing very well today. Her exam has improved significantly since her last visit 1 month ago in terms of strength and range of motion.   She does have crepitus but this is nonpainful. Her confidence has an proved significantly. 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  She has been provided with some precautions allowing her to sit frequently. We will see her back in 2 months. At that point we will measure her for a functional ACL brace  Vaughn Marie is in agreement with this plan. All questions were answered to patient's satisfaction and was encouraged to call with any further questions. Total time spent for evaluation, education, and development of treatment plan: 35 minutes    Antione Townsend, [facility]  9/10/2021    This dictation was performed with a verbal recognition program [facility]) and it was checked for errors. It is possible that there are still dictated errors within this office note. If so, please bring any areas to my attention for an addendum. All efforts were made to ensure that this office note is accurate.

## 2021-09-10 NOTE — LETTER
[facility] [address] [address]  Phone: [phone]  Fax: [phone]    Adan Robledo MD        September 10, 2021     Patient: Sourav Kim   YOB: 1990   Date of Visit: 9/10/2021       To Whom it May Concern:    Sourav Kim was seen in my clinic on 9/10/2021. She may return to work on 09/12/2021 with the following restrictions:  no twisting or turning, no running, no pushing/pulling or lifting over 20 lbs. Please allow to sit as needed until cleared by physician. R-evaluation 2 months. If you have any questions or concerns, please don't hesitate to call.     Sincerely,         Adan Robledo MD

## 2021-09-15 ENCOUNTER — HOSPITAL ENCOUNTER (OUTPATIENT)
Dept: PHYSICAL THERAPY | Age: 31
Setting detail: THERAPIES SERIES
Discharge: HOME | End: 2021-09-15
Payer: COMMERCIAL

## 2021-09-15 PROCEDURE — 97112 NEUROMUSCULAR REEDUCATION: CPT

## 2021-09-15 PROCEDURE — 97110 THERAPEUTIC EXERCISES: CPT

## 2021-09-15 PROCEDURE — 97530 THERAPEUTIC ACTIVITIES: CPT

## 2021-09-15 NOTE — PROGRESS NOTES
[facility], [address] [email or web address] [address] [place] [address], [address] [place] [address]  Phone: [phone]   Fax:     [phone]     Physical Therapy Progress/Daily Treatment Note  Date:  9/15/2021    Patient Name:  Lorraine Phelps    :  1990  MRN: [id number]  Restrictions/Precautions:    Medical/Treatment Diagnosis Information:  · Diagnosis: S83.511A (ICD-10-CM) - Rupture of anterior cruciate ligament of right knee, initial encounter  · Treatment Diagnosis: M25.561 Right knee pain  Insurance/Certification information:  PT Insurance Information: [place]  Physician Information:  Referring Practitioner: Giuliano Son MD  Has the plan of care been signed (Y/N):        [x]  Yes  []  No     Date of Patient follow up with Physician:       Is this a Progress Report:     []  Yes  [x]  No        If Yes:  Date Range for reporting period:  Beginning 21  Ending 21    Progress report will be due (10 Rx or 30 days whichever is less):       Recertification will be due (POC Duration  / 90 days whichever is less): 21         Visit # Insurance Allowable Auth Required   1  1 of 8 visits  20 visits total [place]   8 visits  -  12 visits eval included end date 21 [x]  Yes []  No        Functional Scale:   LEFS 46% deficit Date assessed: 21  LEFS 45% deficit Date assessed: 21  LEFS 59% deficit Date assessed: 21  LEFS 65% deficit Date assessed: 21  LEFS 89% deficit Date assessed:  21       Latex Allergy:  [x]NO      []YES  Preferred Language for Healthcare:   [x]English       []other:    Pain level:  0/10 9/15    SUBJECTIVE:  9/15 Pt returned to work last week working 15 hour night shift causing increase pain and stiffness. Does not have pain at rest and symptoms have decreased since being off work. Started rowing machine with working out and having no issues.      OBJECTIVE: Updated below on 21  ROM PROM AROM Overpressure Comment     L R   L R  L R     Flexion   152  153 149        Extension   +1 +5 +1          Strength L R Comment   Quad 5 5     Hamstring 5 5     Hip  flexion  5 4+     Hip abd  5 4+     Hip add 4+ 4+    Hip ext 4+ 4+          Girth L R   Mid Patella 35 cm 35 cm     Gait: Independent and WNL after cueing. When getting up from sedentary position she demonstrates occasional hip circumduction that improves as she walks more. Incisions: Incision closed with no s/s of infection   Palpation: No tendnerness     RESTRICTIONS/PRECAUTIONS: S/P R ACL with BPTB graft, medial and lateral meniscus tear 5/12/21    Exercises/Interventions:   Exercise/Equipment Resistance/Repetitions Other comments   Stretching/AROM       Ankle pumps 5/19 cont with HEP    Bike x5' AROM  7/2 able to complete full revolutions    Calf stretch 30\"hx3 R ^6/15 slant board with protected weight bearing, bilateral axillary crutches.     Hamstring  30\"hx3 R 5/25 Seated EOT, 1/2 roll under ankle    Prone quad strertch 30\"hx3 R7/30   Heel slides 7/2 reintroduced d/t decrease in squeak   SB roll ins 6/29 withheld d/t audible squeak at patellofemoral joint   Knee flexion PROM  6/29 completed with assist from L LE at EOT   Hip flexor stretch  7/2   Heel raises  6/15 at table with bilateral UE support   Strengthening      SLR 12# 3x10 R ^7/23   SLR abd 12#  3x15 R ^7/23   SLR add 12# 3x10 R ^7/23   SLR ext 12# 3x10 R ^   SLR + 30\"hx3 R ^9/5 5#   LAQ with BS 10# 3x10 R ^9/15 full ROM   Stool scoots  0# 3 laps R knee hamstring 9/15   Hamstring curl ^8/2 standing   SL bridge 7/30   Clams  ^7/30   Neuromuscular re-ed      Quad sets  7/6 reintroduced d/t decrease VMO activation from swelling   Adduction isometric  5/19   Biodex weight bearing ^6/29    Cone walking 6/15 cues for proper knee flexion during swing phase and quad activation during stance phase    8/18 progressed with SL quarter squat    8/18 completed at table with bilateral UE support and limited ROM    TRX  SL squat 3x10 8/25 toe tap on R stopped at 45 deg flexion   SL closed chain hamstring 3x10 R ^9/15 completed at end fo treatment d/t d/t fatigue withheld weight   SLS  30\"hx5 ^9/15 Postural stability L7   Marching  7/13   Step up  8/25 8\" step 5# bilat UE  ^8/31 8\" step 5# bilat UE   Plyo toss trampoline   8/3   Wall sits  9/15 withheld d/t fatigue    Quantum machines       Leg press  70# 3x10 SL R  100# 3x10 eccentric lower on R ^9/15   Leg extension 25# 3x10 DL concentric ^9/15 90<>45 deg,    Leg curl 25# 3x10 SL R ^9/15 10<>45    CC TKE 7 plates 8M14 R ^3/3   Manual interventions                Martin  R 7/16       Blood Flow Restriction Training  Smart Cuff Size: 4  LOP: 212  PTP (60-80% of LOP): 170 (80%)  Exercise Repetition Weight Repetitions                                      Therapeutic Exercise and NMR EXR  [x] (56900) Provided verbal/tactile cueing for activities related to strengthening, flexibility, endurance, ROM for improvements in LE, proximal hip, and core control with self care, mobility, lifting, ambulation.  [] (20696) Provided verbal/tactile cueing for activities related to improving balance, coordination, kinesthetic sense, posture, motor skill, proprioception  to assist with LE, proximal hip, and core control in self care, mobility, lifting, ambulation and eccentric single leg control.      NMR and Therapeutic Activities:    [] (13687 or 85812) Provided verbal/tactile cueing for activities related to improving balance, coordination, kinesthetic sense, posture, motor skill, proprioception and motor activation to allow for proper function of core, proximal hip and LE with self care and ADLs  [] (94781) Gait Re-education- Provided training and instruction to the patient for proper LE, core and proximal hip recruitment and positioning and eccentric body weight control with ambulation re-education including up and down stairs     Home Exercise Program:    [x] (75147) Adjusted     Long Term Goals: To be achieved in: 16 weeks  1. Disability index score of 20% or less for the LEFS to assist with reaching prior level of function. [x]? Progressing: []? Met: []? Not Met: []? Adjusted  2. Patient will demonstrate increased R knee AROM that is equal to L to allow for proper joint functioning as indicated by patients Functional Deficits. []? Progressing: [x]? Met: []? Not Met: []? Adjusted  3. Patient will demonstrate an increase in R knee strength that is equal to L good proximal hip strength and control  in LE to allow for proper functional mobility as indicated by patients Functional Deficits. [x]? Progressing: []? Met: []? Not Met: []? Adjusted  4. Patient will return to all functional activities without increased symptoms or restriction. [x]? Progressing: []? Met: []? Not Met: []? Adjusted  5. Patient will complete Biodex Isokinetic strength with 25% or less deficit of quadriceps and hamsrting strength compared to L as well as PKTQ/BW of 40% or greater of quadriceps and 30% of hamstring on L in order to begin return to running progression  []? Progressing: []? Met: []? Not Met: [x]? Adjusted     Have not yet tested d/t slower progression and will be appropriate to test in one month based on current progression    Overall Progression Towards Functional goals/ Treatment Progress Update:  [] Patient is progressing as expected towards functional goals listed with increase strength and ROM that is appropriate based on MD orders. [x] Progression overall has been slow d/t complexities associated with surgery but patient appears to be back on track with increase in strength, ROM and overall function.  Based on surgical intervention, objective deficits and MD protocol she is appropriate for continued PT to further increase strength and stability allowing for protection of surgical intervention as well as increase in functional independence in order to safely return to work 8/31/21  [] Progression has been slowed due to co-morbidities. [] Plan just implemented, too soon to assess goals progression <30days   [] Goals require adjustment due to lack of progress  [] Patient is not progressing as expected and requires additional follow up with physician  [] Other    Prognosis for POC: [x] Good [] Fair  [] Poor      Patient requires continued skilled intervention: [x] Yes  [] No    Treatment/Activity Tolerance:  [x] Patient able to complete treatment  [] Patient limited by fatigue  [] Patient limited by some pain     [] Patient limited by other medical complications  [x] Other: 9/15 Pt demonstrated fatigue with returning to PT after 2 weeks waiting on insurance approval and returning to work. Challenged with increase in intensity of treatment but only general soreness reported and no c/o sharp pain noted. Patient Education:           8/25 Updated HEP based on treatment  8/18 Updated HEP based on tolerance to treatment  8/2 Updated HEP   7/23 Reviewed proper gait sequencing and activity modification with walking around neighborhood to decrease risk of increase in symptoms. Updated HEP based on tolerance to treatment. 7/16 educated on decreasing ambulation with no AD and to revert to 1 crutch around the house if symptoms in R knee increase or increased antalgic gait with no AD.  7/13 Educated on proper use of unilateral crutch for community ambulation but to attempt ambulating around house without AD as tolerated. 7/6 Educated on activity modification in order to decrease swelling and improve quad activation. Pt to ambulate with unilateral crutch as tolerated. 6/29 Increased weight with HEP. Educated on self patellar mobilizations for home based on MD orders  6/23 Updated HEP, reviewed continued use of ice especially with increase time spent in standing. Stressed importance of decreasing swelling to improve knee ROM and quad activation.    6/11 Educated on 25% weight bearing and proper sequencing during gait. 5/27 Updated HEP and reviewed precuations. 5/25 Reviewed appropriate knee flexion ROM and proper fit of TROM. 5/19 Educated on precautions with knee flexion ROM and to not push through pain but work on gentle ROM. Educated on continued use of ice to decrease swelling and for pain control as she progresses off of pain medication   5/12 Educated on importance of ice/elevation to decrease swelling as well as proper stair negotiation with crutches and non weight bearing on R. Also reviewed importance of consistency with HEP. HEP instruction:   Access Code: Laxmi Stevenson: [email or web address]  Date: 05/12/2021  Prepared by: Carrie Leon         PLAN: See eval  [x] Continue per plan of care [] Alter current plan (see comments above)  [x] Plan of care initiated [] Hold pending MD visit [] Discharge  9/15 Monitor symptoms and increase strength and LE stability as tolerated based on MD protocol. Electronically signed by:  Lyn Polo PT    Note: If patient does not return for scheduled/ recommended follow up visits, this note will serve as a discharge from care along with most recent update on progress.

## 2021-09-23 ENCOUNTER — HOSPITAL ENCOUNTER (OUTPATIENT)
Dept: PHYSICAL THERAPY | Age: 31
Setting detail: THERAPIES SERIES
Discharge: HOME | End: 2021-09-23
Payer: COMMERCIAL

## 2021-09-23 PROCEDURE — 97530 THERAPEUTIC ACTIVITIES: CPT

## 2021-09-23 PROCEDURE — 97110 THERAPEUTIC EXERCISES: CPT

## 2021-09-23 PROCEDURE — 97112 NEUROMUSCULAR REEDUCATION: CPT

## 2021-09-23 NOTE — FLOWSHEET NOTE
[facility], [address] [employer] [address] [place] [address], [address] [place] [address]  Phone: [phone]   Fax:     [phone]     Physical Therapy Progress/Daily Treatment Note  Date:  2021    Patient Name:  Radha Maciel    :  1990  MRN: [id number]  Restrictions/Precautions:    Medical/Treatment Diagnosis Information:  · Diagnosis: S83.511A (ICD-10-CM) - Rupture of anterior cruciate ligament of right knee, initial encounter  · Treatment Diagnosis: M25.561 Right knee pain  Insurance/Certification information:  PT Insurance Information: [place]  Physician Information:  Referring Practitioner: Onofre Turner MD  Has the plan of care been signed (Y/N):        [x]  Yes  []  No     Date of Patient follow up with Physician:       Is this a Progress Report:     []  Yes  [x]  No        If Yes:  Date Range for reporting period:  Beginning 21  Ending 21    Progress report will be due (10 Rx or 30 days whichever is less): 27      Recertification will be due (POC Duration  / 90 days whichever is less): 21         Visit # Insurance Allowable Auth Required   2  2 of 8 visits  21 visits total [place]   8 visits  -  12 visits eval included end date 21 [x]  Yes []  No        Functional Scale:   LEFS 46% deficit Date assessed: 21  LEFS 45% deficit Date assessed: 21  LEFS 59% deficit Date assessed: 21  LEFS 65% deficit Date assessed: 21  LEFS 89% deficit Date assessed:  21       Latex Allergy:  [x]NO      []YES  Preferred Language for Healthcare:   [x]English       []other:    Pain level:  0/10     SUBJECTIVE:   Pt states she has not worked since last week and has 4 shift coming up this weekend. Knee is stiff but denies pain. Reports she feels like things have not gotten easier with machines and TRX squats which is frustrating.  Continues to have crepitus in R knee but has not gotten worse or better and no pain associated with it      OBJECTIVE: Updated below on 8/31/21  ROM PROM AROM Overpressure Comment     L R   L R  L R     Flexion   152  153 149        Extension   +1 +5 +1          Strength L R Comment   Quad 5 5     Hamstring 5 5     Hip  flexion  5 4+     Hip abd  5 4+     Hip add 4+ 4+    Hip ext 4+ 4+          Girth L R   Mid Patella 35 cm 35 cm     Gait: Independent and WNL after cueing. When getting up from sedentary position she demonstrates occasional hip circumduction that improves as she walks more. Incisions: Incision closed with no s/s of infection   Palpation: No tendnerness     RESTRICTIONS/PRECAUTIONS: S/P R ACL with BPTB graft, medial and lateral meniscus tear 5/12/21    Exercises/Interventions:   Exercise/Equipment Resistance/Repetitions Other comments   Stretching/AROM       Ankle pumps 5/19 cont with HEP    Bike x5' AROM  7/2 able to complete full revolutions    Calf stretch 30\"hx3 R ^6/15 slant board with protected weight bearing, bilateral axillary crutches.     Hamstring  30\"hx3 R 5/25 Seated EOT, 1/2 roll under ankle    Prone quad strertch 30\"hx3 R7/30   Heel slides 7/2 reintroduced d/t decrease in squeak   SB roll ins 6/29 withheld d/t audible squeak at patellofemoral joint   Knee flexion PROM  6/29 completed with assist from L LE at EOT   Hip flexor stretch  7/2   Heel raises  6/15 at table with bilateral UE support   Strengthening      SLR 12.5# 3x10 R ^9/23   SLR abd 12.5#  3x15 R ^9/23   SLR add 12.5# 3x10 R ^9/23   SLR ext 12.5# 3x10 R ^9/23   SLR + 30\"hx3 R ^9/5 5#   LAQ with BS 10# 3x10 R ^9/15 full ROM   Stool scoots  0# 3 laps R knee hamstring 9/15   Hamstring curl ^8/2 standing   SL bridge 7/30   Clams  ^7/30   Neuromuscular re-ed      Quad sets  7/6 reintroduced d/t decrease VMO activation from swelling   Adduction isometric  5/19   Biodex weight bearing ^6/29    Cone walking 6/15 cues for proper knee flexion during swing phase and quad activation during stance phase    8/18 core, proximal hip and LE with self care and ADLs  [] (18259) Gait Re-education- Provided training and instruction to the patient for proper LE, core and proximal hip recruitment and positioning and eccentric body weight control with ambulation re-education including up and down stairs     Home Exercise Program:    [x] (27178) Reviewed/Progressed HEP activities related to strengthening, flexibility, endurance, ROM of core, proximal hip and LE for functional self-care, mobility, lifting and ambulation/stair navigation   [] (07354)Reviewed/Progressed HEP activities related to improving balance, coordination, kinesthetic sense, posture, motor skill, proprioception of core, proximal hip and LE for self care, mobility, lifting, and ambulation/stair navigation      Manual Treatments:  PROM / STM / Oscillations-Mobs:  G-I, II, III, IV (PA's, Inf., Post.)  [] (15892) Provided manual therapy to mobilize LE, proximal hip and/or LS spine soft tissue/joints for the purpose of modulating pain, promoting relaxation,  increasing ROM, reducing/eliminating soft tissue swelling/inflammation/restriction, improving soft tissue extensibility and allowing for proper ROM for normal function with self care, mobility, lifting and ambulation. Modalities:  CP declined d/t time 9/23    Charges:  Timed Code Treatment Minutes: 62'   Total Treatment Minutes: 10:12-11:30  66'       [] EVAL (LOW) 61808 (typically 20 minutes face-to-face)  [] EVAL (MOD) 29389 (typically 30 minutes face-to-face)  [] EVAL (HIGH) 92885 (typically 45 minutes face-to-face)  [] RE-EVAL   [x] UT(73239) x 2    [] IONTO  [x] NMR (23677) x 1    [] VASO  [] Manual (85047) x      [] Other:  [x] TA x 1     [] Mech Traction (36022)  [] ES(attended) (35379)      [] ES (un) (73518):     GOALS:   Patient stated goal: Return to [employer]    [x]? Progressing: []? Met: []? Not Met: []? Adjusted     Therapist goals for Patient:   Short Term Goals: To be achieved in: 8 weeks  1. Independent in HEP and progression per patient tolerance, in order to prevent re-injury. []? Progressing: [x]? Met: []? Not Met: []? Adjusted   2. Patient will have a decrease in pain to facilitate improvement in movement, function, and ADLs as indicated by Functional Deficits. []? Progressing: [x]? Met: []? Not Met: []? Adjusted     Long Term Goals: To be achieved in: 16 weeks  1. Disability index score of 20% or less for the LEFS to assist with reaching prior level of function. [x]? Progressing: []? Met: []? Not Met: []? Adjusted  2. Patient will demonstrate increased R knee AROM that is equal to L to allow for proper joint functioning as indicated by patients Functional Deficits. []? Progressing: [x]? Met: []? Not Met: []? Adjusted  3. Patient will demonstrate an increase in R knee strength that is equal to L good proximal hip strength and control  in LE to allow for proper functional mobility as indicated by patients Functional Deficits. [x]? Progressing: []? Met: []? Not Met: []? Adjusted  4. Patient will return to all functional activities without increased symptoms or restriction. [x]? Progressing: []? Met: []? Not Met: []? Adjusted  5. Patient will complete Biodex Isokinetic strength with 25% or less deficit of quadriceps and hamsrting strength compared to L as well as PKTQ/BW of 40% or greater of quadriceps and 30% of hamstring on L in order to begin return to running progression  []? Progressing: []? Met: []? Not Met: [x]? Adjusted     Have not yet tested d/t slower progression and will be appropriate to test in one month based on current progression    Overall Progression Towards Functional goals/ Treatment Progress Update:  [] Patient is progressing as expected towards functional goals listed with increase strength and ROM that is appropriate based on MD orders.      [x] Progression overall has been slow d/t complexities associated with surgery but patient appears to be back on track with increase in strength, ROM and overall function. Based on surgical intervention, objective deficits and MD protocol she is appropriate for continued PT to further increase strength and stability allowing for protection of surgical intervention as well as increase in functional independence in order to safely return to work 8/31/21  [] Progression has been slowed due to co-morbidities. [] Plan just implemented, too soon to assess goals progression <30days   [] Goals require adjustment due to lack of progress  [] Patient is not progressing as expected and requires additional follow up with physician  [] Other    Prognosis for POC: [x] Good [] Fair  [] Poor      Patient requires continued skilled intervention: [x] Yes  [] No    Treatment/Activity Tolerance:  [x] Patient able to complete treatment  [] Patient limited by fatigue  [] Patient limited by some pain     [] Patient limited by other medical complications  [x] Other: 9/23 Multiple verbal and tactile cues required to avoid hip drop causing IR and adduction as well as knee valgus. Responded well to cues allowing her to complete LTD and TRX squats appropriately but decrease depth required to assist with avoiding compensation. Fatigued by end of treatment but no adverse effects noted or reported. Patient Education:           8/25 Updated HEP based on treatment  8/18 Updated HEP based on tolerance to treatment  8/2 Updated HEP   7/23 Reviewed proper gait sequencing and activity modification with walking around neighborhood to decrease risk of increase in symptoms. Updated HEP based on tolerance to treatment. 7/16 educated on decreasing ambulation with no AD and to revert to 1 crutch around the house if symptoms in R knee increase or increased antalgic gait with no AD.  7/13 Educated on proper use of unilateral crutch for community ambulation but to attempt ambulating around house without AD as tolerated.    7/6 Educated on activity modification in order to decrease swelling and improve quad activation. Pt to ambulate with unilateral crutch as tolerated. 6/29 Increased weight with HEP. Educated on self patellar mobilizations for home based on MD orders  6/23 Updated HEP, reviewed continued use of ice especially with increase time spent in standing. Stressed importance of decreasing swelling to improve knee ROM and quad activation. 6/11 Educated on 25% weight bearing and proper sequencing during gait. 5/27 Updated HEP and reviewed precuations. 5/25 Reviewed appropriate knee flexion ROM and proper fit of TROM. 5/19 Educated on precautions with knee flexion ROM and to not push through pain but work on gentle ROM. Educated on continued use of ice to decrease swelling and for pain control as she progresses off of pain medication   5/12 Educated on importance of ice/elevation to decrease swelling as well as proper stair negotiation with crutches and non weight bearing on R. Also reviewed importance of consistency with HEP. HEP instruction:   Access Code: Chago Grant: [email or web address]  Date: 05/12/2021  Prepared by: Maricarmen Kirkland         PLAN: See eval  [x] Continue per plan of care [] Ashley Del Angel current plan (see comments above)  [x] Plan of care initiated [] Hold pending MD visit [] Discharge  9/23 Monitor symptoms and increase strength and LE stability as tolerated based on MD protocol. Complete progress note NPV. Electronically signed by:  Nick Wells PT    Note: If patient does not return for scheduled/ recommended follow up visits, this note will serve as a discharge from care along with most recent update on progress.

## 2021-09-29 ENCOUNTER — HOSPITAL ENCOUNTER (OUTPATIENT)
Dept: PHYSICAL THERAPY | Age: 31
Setting detail: THERAPIES SERIES
Discharge: HOME | End: 2021-09-29
Payer: COMMERCIAL

## 2021-09-29 PROCEDURE — 97110 THERAPEUTIC EXERCISES: CPT

## 2021-09-29 PROCEDURE — 97530 THERAPEUTIC ACTIVITIES: CPT

## 2021-09-29 PROCEDURE — 97112 NEUROMUSCULAR REEDUCATION: CPT

## 2021-09-29 NOTE — PROGRESS NOTES
The [address] [place] [address],[address], 800 [facility] 3360 [facility], [address] [place] [address]  Phone: [phone]   Fax:     [phone]    Physical Therapy Re-Certification Plan of Care    Dear Dr. Janey Huber,    We had the pleasure of treating the following patient for physical therapy services at [address] [place] [address]. A summary of our findings can be found in the updated assessment below. This includes our plan of care. If you have any questions or concerns regarding these findings, please do not hesitate to contact me at the office phone number checked above. Thank you for the referral.     Physician Signature:________________________________Date:__________________  By signing above (or electronic signature), therapists plan is approved by physician      Overall Response to Treatment:   []Patient is responding well to treatment and improvement is noted with regards  to goals   []Patient should continue to improve in reasonable time if they continue HEP   []Patient has plateaued and is no longer responding to skilled PT intervention    []Patient is getting worse and would benefit from return to referring MD   []Patient unable to adhere to initial POC   [x]Other: Patient progress overall has been slow d/t complexity of surgery as well as s/s of infection and patellofemoral issues. She is currently progressing well but d/t prior complexities and continued limitations she is appropriate for continued PT.       Physical Therapy Progress/Daily Treatment Note  Date:  2021    Patient Name:  Scarlet Perez    :  1990  MRN: [id number]  Restrictions/Precautions:    Medical/Treatment Diagnosis Information:  · Diagnosis: F36.212R (ICD-10-CM) - Rupture of anterior cruciate ligament of right knee, initial encounter  · Treatment Diagnosis: M25.561 Right knee pain  Insurance/Certification information:  PT Insurance Information: Jose  Physician cueing. When getting up from sedentary position she demonstrates occasional hip circumduction that improves as she walks more. Incisions: Incision closed with no s/s of infection   Palpation: Tenderness medial joint line     RESTRICTIONS/PRECAUTIONS: S/P R ACL with BPTB graft, medial and lateral meniscus tear 5/12/21    Exercises/Interventions:   Exercise/Equipment Resistance/Repetitions Other comments   Stretching/AROM       Ankle pumps 5/19 cont with HEP    Bike x5' AROM  7/2 able to complete full revolutions    Calf stretch 30\"hx3 R ^6/15 slant board with protected weight bearing, bilateral axillary crutches.     Hamstring  30\"hx3 R 5/25 Seated EOT, 1/2 roll under ankle    Prone quad strertch 30\"hx3 R7/30   Heel slides 7/2 reintroduced d/t decrease in squeak   SB roll ins 6/29 withheld d/t audible squeak at patellofemoral joint   Knee flexion PROM  6/29 completed with assist from L LE at EOT   Hip flexor stretch  7/2   Heel raises  6/15 at table with bilateral UE support   Strengthening      SLR 12# 3x10 R ^9/23   SLR abd 12#  3x15 R ^9/23   SLR add 12# 3x10 R ^9/23   SLR ext 12# 3x10 R ^9/23   SLR + 30\"hx3 R ^9/5 5#   LAQ with BS 12# 3x10 R ^9/29 full ROM   Stool scoots  0# 3 laps R knee hamstring 9/15   Side stepping  Lal 3 laps9/29   Band walks Lal 3 laps9/29   Hamstring curl ^8/2 standing   SL bridge 7/30   Clams  ^7/30   Neuromuscular re-ed      Quad sets  7/6 reintroduced d/t decrease VMO activation from swelling   Adduction isometric  5/19   Biodex weight bearing ^6/29    Cone walking 6/15 cues for proper knee flexion during swing phase and quad activation during stance phase    Reverse lunge with slider 3x10 R 9/29 reintroduced d/t difficulty with eccentric control    8/18 completed at table with bilateral UE support and limited ROM    LTD 3x10 R ^9/29 completed on 4\" step   TRX  DL squat 30\"hx5 9/29   ^9/23 5# bilater UE   Hip hike 3x10 R 9/29 completed on 4\" step    SLS with hip abd 3x10 Green loop proper fit of TROM. 5/19 Educated on precautions with knee flexion ROM and to not push through pain but work on gentle ROM. Educated on continued use of ice to decrease swelling and for pain control as she progresses off of pain medication   5/12 Educated on importance of ice/elevation to decrease swelling as well as proper stair negotiation with crutches and non weight bearing on R. Also reviewed importance of consistency with HEP. HEP instruction:   Access Code: Alice Flaming           PLAN: See eval  [x] Continue per plan of care [] Alter current plan (see comments above)  [x] Plan of care initiated [] Hold pending MD visit [] Discharge  9/29: Continue with PT1x a week for 4 weeks to further increase strength and stability in weight bearing to improve function. Begin isokinetic training in order to complete testing in 2-4 weeks based on symptoms. Electronically signed by:  Nick Wells, PT    Note: If patient does not return for scheduled/ recommended follow up visits, this note will serve as a discharge from care along with most recent update on progress.

## 2021-10-05 ENCOUNTER — APPOINTMENT (OUTPATIENT)
Dept: PHYSICAL THERAPY | Age: 31
End: 2021-10-05
Payer: COMMERCIAL

## 2021-10-06 ENCOUNTER — HOSPITAL ENCOUNTER (OUTPATIENT)
Dept: PHYSICAL THERAPY | Age: 31
Setting detail: THERAPIES SERIES
Discharge: HOME | End: 2021-10-06
Payer: COMMERCIAL

## 2021-10-06 PROCEDURE — 97140 MANUAL THERAPY 1/> REGIONS: CPT

## 2021-10-06 PROCEDURE — 97112 NEUROMUSCULAR REEDUCATION: CPT

## 2021-10-06 PROCEDURE — 97530 THERAPEUTIC ACTIVITIES: CPT

## 2021-10-06 PROCEDURE — 97110 THERAPEUTIC EXERCISES: CPT

## 2021-10-06 NOTE — FLOWSHEET NOTE
The [address] [place], [place] [address], [address], [address] [place] [address]  Phone: [phone]   Fax:     [phone]      Physical Therapy Progress/Daily Treatment Note  Date:  10/6/2021    Patient Name:  Tello Croft    :  1990  MRN: [id number]  Restrictions/Precautions:    Medical/Treatment Diagnosis Information:  · Diagnosis: S83.511A (ICD-10-CM) - Rupture of anterior cruciate ligament of right knee, initial encounter  · Treatment Diagnosis: M25.561 Right knee pain  Insurance/Certification information:  PT Insurance Information: [place]  Physician Information:  Referring Practitioner: Livia Skinner MD  Has the plan of care been signed (Y/N):        [x]  Yes  []  No     Date of Patient follow up with Physician:       Is this a Progress Report:     []  Yes  [x]  No        If Yes:  Date Range for reporting period:  Beginning 21  Ending 21    Progress report will be due (10 Rx or 30 days whichever is less):       Recertification will be due (POC Duration  / 90 days whichever is less): 21         Visit # Insurance Allowable Auth Required   4  4 of 8 visits  21 visits total [place]   8 visits  -  12 visits eval included end date 21 [x]  Yes []  No        Functional Scale:   LEFS 41% deficit Date assessed: 21  LEFS 46% deficit Date assessed: 21  LEFS 45% deficit Date assessed: 21  LEFS 59% deficit Date assessed: 21  LEFS 65% deficit Date assessed: 21  LEFS 89% deficit Date assessed:  21       Latex Allergy:  [x]NO      []YES  Preferred Language for Healthcare:   [x]English       []other:    Pain level:  0/10 10/6    SUBJECTIVE:  10/6  Pt reports she worked 10/4/21 with no issues.  Still gets occasionally catching sensation with quick movements such as sit to stand    OBJECTIVE:   Observation:    DL squat demonstrated weight shift to L   SL squat 75% decrease in depth on R compared to L with valgus and excessive forward knee flexion noted   Test measurements: 9/29/21  ROM PROM AROM Overpressure Comment     L R   L R  L R     Flexion   152  153 150        Extension   +2 +5 +2          Strength L R Comment   Quad 5 5     Hamstring 5 5     Hip  flexion  5 5     Hip abd  5 5     Hip add 5 5    Hip ext 5 4+          Girth L R   Mid Patella 34.5 cm 34.5 cm     Gait: Independent and WNL after cueing. When getting up from sedentary position she demonstrates occasional hip circumduction that improves as she walks more. Incisions: Incision closed with no s/s of infection   Palpation: Tenderness Distal ITB at lateral femoral condyle   RESTRICTIONS/PRECAUTIONS: S/P R ACL with BPTB graft, medial and lateral meniscus tear 5/12/21    Exercises/Interventions:   Exercise/Equipment Resistance/Repetitions Other comments   Stretching/AROM       Ankle pumps 5/19 cont with HEP    Bike x5' AROM  7/2 able to complete full revolutions    Calf stretch 30\"hx3 R ^6/15 slant board with protected weight bearing, bilateral axillary crutches.     Hamstring  30\"hx3 R 5/25 Seated EOT, 1/2 roll under ankle    Prone quad strertch 30\"hx3 R7/30   ITB stretch 30\"hx3 R 10/6   Heel slides 7/2 reintroduced d/t decrease in squeak   SB roll ins 6/29 withheld d/t audible squeak at patellofemoral joint   Knee flexion PROM  6/29 completed with assist from L LE at EOT   Hip flexor stretch  7/2   Heel raises  6/15 at table with bilateral UE support   Strengthening      SLR 12# 3x10 R ^9/23   SLR abd 12#  3x15 R ^9/23   SLR add 12# 3x10 R ^9/23   SLR ext 12# 3x10 R ^9/23   SLR + 30\"hx4 R ^10/6 5#   LAQ with BS 12# 3x10 R ^9/29 full ROM   Stool scoots   9/15   Side stepping  Lal 3 laps9/29   Band walks Lal 3 laps9/29   Hamstring curl ^8/2 standing   SL bridge 7/30   Clams  ^7/30   Neuromuscular re-ed      Quad sets  7/6 reintroduced d/t decrease VMO activation from swelling   Adduction isometric  5/19   Biodex weight bearing ^6/29    Cone walking 6/15 cues for proper knee flexion during swing phase and quad activation during stance phase    Reverse lunge with slider 3x10 R 9/29 reintroduced d/t difficulty with eccentric control    8/18 completed at table with bilateral UE support and limited ROM    LTD 3x10 R ^9/29 completed on 4\" step   TRX  DL squat 30\"hx5 ^10/6 Gray TB around knees   ^9/23 5# bilater UE   Hip hike 3x10 R 9/29 completed on 4\" step    SLS with hip abd 3x10 Green loop ^9/29 standing on 4\" step   Marching  7/13   Step up 3x10 R forward  3x10 R lateral ^9/29 10\" step   Plyo toss trampoline   8/3   Wall sits  9/15 withheld d/t fatigue    Quantum machines       Leg press  80# 2x10, 70# 1x10 SL R    90# 3x10 eccentric lower on R 10/6 decreased weight last set d/t fatigue  10/6 decreased weight d/t fatigue    Leg extension 40# 3x10 DL concentric ^10/6 90<>45 deg,    Leg curl 25# 3x10 SL R ^9/15 10<>45  9/23 attempted to increase weight to 30# but difficulty initiating movement     CC TKE 7 plates 5B95 R ^8/8   Isokinetic training 3 sets of 5 reps @ 180  3 sets of 15 res @ 300 10/6 Completed with R LE only   Manual interventions        STM R distal ITB, incorporated knee passive ROM for active release x10' 10/6 had to incorporate active release d/t tenderness and inability to tolerated static STM    Martin  R 7/16       Blood Flow Restriction Training  Smart Cuff Size: 4  LOP: 212  PTP (60-80% of LOP): 170 (80%)  Exercise Repetition Weight Repetitions                                      Therapeutic Exercise and NMR EXR  [x] (48217) Provided verbal/tactile cueing for activities related to strengthening, flexibility, endurance, ROM for improvements in LE, proximal hip, and core control with self care, mobility, lifting, ambulation.  [] (08404) Provided verbal/tactile cueing for activities related to improving balance, coordination, kinesthetic sense, posture, motor skill, proprioception  to assist with LE, proximal hip, and core control in self care, mobility, lifting, ambulation and eccentric single leg control. NMR and Therapeutic Activities:    [] (32901 or 06024) Provided verbal/tactile cueing for activities related to improving balance, coordination, kinesthetic sense, posture, motor skill, proprioception and motor activation to allow for proper function of core, proximal hip and LE with self care and ADLs  [] (16201) Gait Re-education- Provided training and instruction to the patient for proper LE, core and proximal hip recruitment and positioning and eccentric body weight control with ambulation re-education including up and down stairs     Home Exercise Program:    [x] (31299) Reviewed/Progressed HEP activities related to strengthening, flexibility, endurance, ROM of core, proximal hip and LE for functional self-care, mobility, lifting and ambulation/stair navigation   [] (06464)Reviewed/Progressed HEP activities related to improving balance, coordination, kinesthetic sense, posture, motor skill, proprioception of core, proximal hip and LE for self care, mobility, lifting, and ambulation/stair navigation      Manual Treatments:  PROM / STM / Oscillations-Mobs:  G-I, II, III, IV (PA's, Inf., Post.)  [] (12580) Provided manual therapy to mobilize LE, proximal hip and/or LS spine soft tissue/joints for the purpose of modulating pain, promoting relaxation,  increasing ROM, reducing/eliminating soft tissue swelling/inflammation/restriction, improving soft tissue extensibility and allowing for proper ROM for normal function with self care, mobility, lifting and ambulation.      Modalities:  CP R knee x10'     Charges:  Timed Code Treatment Minutes: 68'   Total Treatment Minutes: 1:42-3:32  110'       [] EVAL (LOW) 10981 (typically 20 minutes face-to-face)  [] EVAL (MOD) 65717 (typically 30 minutes face-to-face)  [] EVAL (HIGH) 45376 (typically 45 minutes face-to-face)  [] RE-EVAL   [x] VH(29266) x 2    [] IONTO  [x] NMR (59129) x 1    [] VASO  [x] Manual (37088) x 1     [] Other:  [x] TA x 1     [] [facility] Traction (24687)  [] ES(attended) (42451)      [] ES (un) (68567):     GOALS:   Patient stated goal: Return to [employer]    [x]? Progressing: []? Met: []? Not Met: []? Adjusted     Therapist goals for Patient:   Short Term Goals: To be achieved in: 8 weeks  1. Independent in HEP and progression per patient tolerance, in order to prevent re-injury. []? Progressing: [x]? Met: []? Not Met: []? Adjusted   2. Patient will have a decrease in pain to facilitate improvement in movement, function, and ADLs as indicated by Functional Deficits. []? Progressing: [x]? Met: []? Not Met: []? Adjusted     Long Term Goals: To be achieved in: 16 weeks  1. Disability index score of 20% or less for the LEFS to assist with reaching prior level of function. [x]? Progressing: []? Met: []? Not Met: []? Adjusted  2. Patient will demonstrate increased R knee AROM that is equal to L to allow for proper joint functioning as indicated by patients Functional Deficits. []? Progressing: [x]? Met: []? Not Met: []? Adjusted  3. Patient will demonstrate an increase in R knee strength that is equal to L good proximal hip strength and control  in LE to allow for proper functional mobility as indicated by patients Functional Deficits. [x]? Progressing: []? Met: []? Not Met: []? Adjusted  4. Patient will return to all functional activities without increased symptoms or restriction. [x]? Progressing: []? Met: []? Not Met: []? Adjusted  5. Patient will complete Biodex Isokinetic strength with 25% or less deficit of quadriceps and hamsrting strength compared to L as well as PKTQ/BW of 40% or greater of quadriceps and 30% of hamstring on L in order to begin return to running progression  []? Progressing: []? Met: []? Not Met: [x]? Adjusted     Have not yet tested d/t slower progression and will test in 2-4 weeks based on pt progress and presentation.      Overall Progression Towards Functional goals/ Treatment Progress Update:  [] Patient is progressing as expected towards functional goals listed with increase strength and ROM that is appropriate based on MD orders. [x] Progression overall has been slow d/t complexities associated with surgery but is continuing to progress with increase in strength, ROM and overall function. Based on surgical intervention, objective deficits and MD protocol she is appropriate for continued PT to further increase strength and stability allowing for protection of surgical intervention as well as increase in functional independence 9/29/21  [] Progression has been slowed due to co-morbidities. [] Plan just implemented, too soon to assess goals progression <30days   [] Goals require adjustment due to lack of progress  [] Patient is not progressing as expected and requires additional follow up with physician  [] Other    Prognosis for POC: [x] Good [] Fair  [] Poor      Patient requires continued skilled intervention: [x] Yes  [] No    Treatment/Activity Tolerance:  [x] Patient able to complete treatment  [] Patient limited by fatigue  [] Patient limited by some pain     [] Patient limited by other medical complications  [x] Other: 10/6 Tightness noted @ distal ITB that was tender to touch and responded well to [facility] with active release allowing for decrease in symptoms. Discomfort noted at anterior aspect of R knee during isokinetic training that improved with sets. Fatigued by end of treatment but no adverse effects noted or reported. Patient Education:           9/29 Updated HEP based on tolerance to treatment  8/25 Updated HEP based on treatment  8/18 Updated HEP based on tolerance to treatment  8/2 Updated HEP   7/23 Reviewed proper gait sequencing and activity modification with walking around neighborhood to decrease risk of increase in symptoms. Updated HEP based on tolerance to treatment.    7/16 educated on decreasing ambulation with no AD and to revert to 1 crutch around the house if symptoms in R knee increase or increased antalgic gait with no AD.  7/13 Educated on proper use of unilateral crutch for community ambulation but to attempt ambulating around house without AD as tolerated. 7/6 Educated on activity modification in order to decrease swelling and improve quad activation. Pt to ambulate with unilateral crutch as tolerated. 6/29 Increased weight with HEP. Educated on self patellar mobilizations for home based on MD orders  6/23 Updated HEP, reviewed continued use of ice especially with increase time spent in standing. Stressed importance of decreasing swelling to improve knee ROM and quad activation. 6/11 Educated on 25% weight bearing and proper sequencing during gait. 5/27 Updated HEP and reviewed precuations. 5/25 Reviewed appropriate knee flexion ROM and proper fit of TROM. 5/19 Educated on precautions with knee flexion ROM and to not push through pain but work on gentle ROM. Educated on continued use of ice to decrease swelling and for pain control as she progresses off of pain medication   5/12 Educated on importance of ice/elevation to decrease swelling as well as proper stair negotiation with crutches and non weight bearing on R. Also reviewed importance of consistency with HEP. HEP instruction:   Access Code: Candelario Roman           PLAN: See eval  [x] Continue per plan of care [] Alter current plan (see comments above)  [x] Plan of care initiated [] Hold pending MD visit [] Discharge  10/6 Monitor ITB tenderness and continue with manual therapy as needed. Continue to push strengthening and isokinetic training to improve comfort and confidence before testing.   9/29: Continue with PT1x a week for 4 weeks to further increase strength and stability in weight bearing to improve function. Begin isokinetic training in order to complete testing in 2-4 weeks based on symptoms.           Electronically signed by: Aracelis Valdez, [place]    Note: If patient does not return for scheduled/ recommended follow up visits, this note will serve as a discharge from care along with most recent update on progress.

## 2021-10-14 ENCOUNTER — HOSPITAL ENCOUNTER (OUTPATIENT)
Dept: PHYSICAL THERAPY | Age: 31
Setting detail: THERAPIES SERIES
Discharge: HOME | End: 2021-10-14
Payer: COMMERCIAL

## 2021-10-14 PROCEDURE — 97112 NEUROMUSCULAR REEDUCATION: CPT

## 2021-10-14 PROCEDURE — 97530 THERAPEUTIC ACTIVITIES: CPT

## 2021-10-14 PROCEDURE — 97110 THERAPEUTIC EXERCISES: CPT

## 2021-10-14 NOTE — FLOWSHEET NOTE
[facility], [address] [employer] [address] [place] [address], [address] [place] [address]  Phone: [phone]   Fax:     [phone]      Physical Therapy Progress/Daily Treatment Note  Date:  10/14/2021    Patient Name:  Swati Kauffman    :  1990  MRN: [id number]  Restrictions/Precautions:    Medical/Treatment Diagnosis Information:  · Diagnosis: S83.511A (ICD-10-CM) - Rupture of anterior cruciate ligament of right knee, initial encounter  · Treatment Diagnosis: M25.561 Right knee pain  Insurance/Certification information:  PT Insurance Information: [facility]  Physician Information:  Referring Practitioner: Tino James MD  Has the plan of care been signed (Y/N):        [x]  Yes  []  No     Date of Patient follow up with Physician:       Is this a Progress Report:     []  Yes  [x]  No        If Yes:  Date Range for reporting period:  Beginning 21  Ending 21    Progress report will be due (10 Rx or 30 days whichever is less):       Recertification will be due (POC Duration  / 90 days whichever is less): 21         Visit # Insurance Allowable Auth Required   5  5 of 8 visits  21 visits total [facility]   8 visits  -  12 visits eval included end date 21 [x]  Yes []  No        Functional Scale:   LEFS 41% deficit Date assessed: 21  LEFS 46% deficit Date assessed: 21  LEFS 45% deficit Date assessed: 21  LEFS 59% deficit Date assessed: 21  LEFS 65% deficit Date assessed: 21  LEFS 89% deficit Date assessed:  21       Latex Allergy:  [x]NO      []YES  Preferred Language for Healthcare:   [x]English       []other:    Pain level:  0/10 10/14    SUBJECTIVE:  10/14 Pt states she could not extend her knee for up to 2 days after attempting [employer] isokinetic training. Symptoms have improved and able to get through work this past week without increase in symptoms.  Endurance is improving with standing but still has catch with quick movements. Worked on ITB which is feeling better      OBJECTIVE:   Observation: 9/292/21   DL squat demonstrated weight shift to L   SL squat 75% decrease in depth on R compared to L with valgus and excessive forward knee flexion noted   Test measurements: 9/29/21  ROM PROM AROM Overpressure Comment     L R   L R  L R     Flexion   152  153 150        Extension   +2 +5 +2          Strength L R Comment   Quad 5 5     Hamstring 5 5     Hip  flexion  5 5     Hip abd  5 5     Hip add 5 5    Hip ext 5 4+          Girth L R   Mid Patella 34.5 cm 34.5 cm     Gait: Independent and WNL after cueing. When getting up from sedentary position she demonstrates occasional hip circumduction that improves as she walks more. Incisions: Incision closed with no s/s of infection   Palpation: Tenderness Distal ITB at lateral femoral condyle   RESTRICTIONS/PRECAUTIONS: S/P R ACL with BPTB graft, medial and lateral meniscus tear 5/12/21    Exercises/Interventions:   Exercise/Equipment Resistance/Repetitions Other comments   Stretching/AROM       Ankle pumps 5/19 cont with HEP    Bike x5' AROM  7/2 able to complete full revolutions    Calf stretch 30\"hx3 R ^6/15 slant board with protected weight bearing, bilateral axillary crutches.     Hamstring  30\"hx3 R 5/25 Seated EOT, 1/2 roll under ankle    Prone quad strertch 30\"hx3 R7/30   ITB stretch 30\"hx3 R 10/6   Heel slides 7/2 reintroduced d/t decrease in squeak   SB roll ins 6/29 withheld d/t audible squeak at patellofemoral joint   Knee flexion PROM  6/29 completed with assist from L LE at EOT   Hip flexor stretch  7/2   Heel raises  6/15 at table with bilateral UE support   Strengthening      SLR Blue TB 3x10 10/14 emphasis on eccentric control with lower   SLR abd 10/14 cont with HEP   SLR add 10/14 cont with HEP   SLR ext 10/14 cont with HEP   SLR + 30\"hx5 R ^10/14 5#   ^9/29 full ROM   Stool scoots   9/15   Side stepping  Lal 3 laps9/29   Band walks Vincent Mason 3 laps9/29 Hamstring curl ^8/2 standing   SL bridge 7/30   Clams  ^7/30   Neuromuscular re-ed      Reverse lunge with slider 3x10 R ^10/14 Blue TB placed around lateral aspect of R knee    LTD 3x10 R Blue TB around knees ^10/14 completed on 4\" step   TRX  DL squat 30\"hx5 ^10/6 Gray TB around knees   SL closed chain [id number] R10/14 standing on airex, cone touch with R hand   9/29 completed on 4\" step    SLS with hip abd 3x10 blue versa loop ^10/14 standing on 4\" step   Marching  7/13   Step up 3x10 R forward  3x10 R lateral ^9/29 10\" step   Plyo toss trampoline   8/3   Wall sits  9/15 withheld d/t fatigue    Quantum machines       Leg press  85# 3x10 SL R    100# 3x10 eccentric lower on R ^10/14 decreased weight last set d/t fatigue  10/6 decreased weight d/t fatigue    Leg extension 15# 3x10 ECC lower on R ^10/14 90<>45 deg,    Leg curl 25# 3x10 SL R ^9/15 10<>45  9/23 attempted to increase weight to 30# but difficulty initiating movement     CC TKE 7 plates 5V93 R ^0/0   10/6 Completed with R LE only   Manual interventions        STM  10/14 withheld d/t     Martin  R 7/16       Blood Flow Restriction Training  Smart Cuff Size: 4  LOP: 212  PTP (60-80% of LOP): 170 (80%)  Exercise Repetition Weight Repetitions                                      Therapeutic Exercise and NMR EXR  [x] (26666) Provided verbal/tactile cueing for activities related to strengthening, flexibility, endurance, ROM for improvements in LE, proximal hip, and core control with self care, mobility, lifting, ambulation.  [] (01434) Provided verbal/tactile cueing for activities related to improving balance, coordination, kinesthetic sense, posture, motor skill, proprioception  to assist with LE, proximal hip, and core control in self care, mobility, lifting, ambulation and eccentric single leg control.      NMR and Therapeutic Activities:    [] (24938 or ) Provided verbal/tactile cueing for activities related to improving balance, coordination, kinesthetic sense, posture, motor skill, proprioception and motor activation to allow for proper function of core, proximal hip and LE with self care and ADLs  [] (66051) Gait Re-education- Provided training and instruction to the patient for proper LE, core and proximal hip recruitment and positioning and eccentric body weight control with ambulation re-education including up and down stairs     Home Exercise Program:    [x] (41677) Reviewed/Progressed HEP activities related to strengthening, flexibility, endurance, ROM of core, proximal hip and LE for functional self-care, mobility, lifting and ambulation/stair navigation   [] (68047)Reviewed/Progressed HEP activities related to improving balance, coordination, kinesthetic sense, posture, motor skill, proprioception of core, proximal hip and LE for self care, mobility, lifting, and ambulation/stair navigation      Manual Treatments:  PROM / STM / Oscillations-Mobs:  G-I, II, III, IV (PA's, Inf., Post.)  [] (72945) Provided manual therapy to mobilize LE, proximal hip and/or LS spine soft tissue/joints for the purpose of modulating pain, promoting relaxation,  increasing ROM, reducing/eliminating soft tissue swelling/inflammation/restriction, improving soft tissue extensibility and allowing for proper ROM for normal function with self care, mobility, lifting and ambulation. Modalities:  CP R knee x10' 10/14/21    Charges:  Timed Code Treatment Minutes: 61'   Total Treatment Minutes: 10:18-11:51  93'       [] EVAL (LOW) 20309 (typically 20 minutes face-to-face)  [] EVAL (MOD) 18006 (typically 30 minutes face-to-face)  [] EVAL (HIGH) 64836 (typically 45 minutes face-to-face)  [] RE-EVAL   [x] UP(64730) x 2    [] IONTO  [x] NMR (81069) x 1    [] VASO  [] Manual (56366) x      [] Other:  [x] TA x 1     [] Mech Traction (18161)  [] ES(attended) (82210)      [] ES (un) (96524):     GOALS:   Patient stated goal: Return to [employer]    [x]? Progressing: []? Met: []?  Not been slow d/t complexities associated with surgery but is continuing to progress with increase in strength, ROM and overall function. Based on surgical intervention, objective deficits and MD protocol she is appropriate for continued PT to further increase strength and stability allowing for protection of surgical intervention as well as increase in functional independence 9/29/21  [] Progression has been slowed due to co-morbidities. [] Plan just implemented, too soon to assess goals progression <30days   [] Goals require adjustment due to lack of progress  [] Patient is not progressing as expected and requires additional follow up with physician  [] Other    Prognosis for POC: [x] Good [] Fair  [] Poor      Patient requires continued skilled intervention: [x] Yes  [] No    Treatment/Activity Tolerance:  [x] Patient able to complete treatment  [] Patient limited by fatigue  [] Patient limited by some pain     [] Patient limited by other medical complications  [x] Other: 10/14 Decrease tightness noted at ITB d/t pt stretching and completing STM to area. Crepitation noted at R patella with PROM knee assessment but no instability or pain. Challenged and fatigued with increase in intensity of treatment that placed greater emphasis on eccentric strength. No reported adverse effects by end of treatment. Patient Education:           10/14 Educated on eccentric strengthening of quad. 9/29 Updated HEP based on tolerance to treatment  8/25 Updated HEP based on treatment  8/18 Updated HEP based on tolerance to treatment  8/2 Updated HEP   7/23 Reviewed proper gait sequencing and activity modification with walking around neighborhood to decrease risk of increase in symptoms. Updated HEP based on tolerance to treatment.    7/16 educated on decreasing ambulation with no AD and to revert to 1 crutch around the house if symptoms in R knee increase or increased antalgic gait with no AD.  7/13 Educated on proper use of unilateral crutch for community ambulation but to attempt ambulating around house without AD as tolerated. 7/6 Educated on activity modification in order to decrease swelling and improve quad activation. Pt to ambulate with unilateral crutch as tolerated. 6/29 Increased weight with HEP. Educated on self patellar mobilizations for home based on MD orders  6/23 Updated HEP, reviewed continued use of ice especially with increase time spent in standing. Stressed importance of decreasing swelling to improve knee ROM and quad activation. 6/11 Educated on 25% weight bearing and proper sequencing during gait. 5/27 Updated HEP and reviewed precuations. 5/25 Reviewed appropriate knee flexion ROM and proper fit of TROM. 5/19 Educated on precautions with knee flexion ROM and to not push through pain but work on gentle ROM. Educated on continued use of ice to decrease swelling and for pain control as she progresses off of pain medication   5/12 Educated on importance of ice/elevation to decrease swelling as well as proper stair negotiation with crutches and non weight bearing on R. Also reviewed importance of consistency with HEP. HEP instruction:   Access Code: Andrew Mathew           PLAN: See eval  [x] Continue per plan of care [] Alter current plan (see comments above)  [x] Plan of care initiated [] Hold pending MD visit [] Discharge  10/14 Monitor symptoms, hold on isokinetic training and continue to push strengthening and dynamic balance. 9/29: Continue with PT1x a week for 4 weeks to further increase strength and stability in weight bearing to improve function. Begin isokinetic training in order to complete testing in 2-4 weeks based on symptoms. Electronically signed by:  Dayanna Mustafa PT    Note: If patient does not return for scheduled/ recommended follow up visits, this note will serve as a discharge from care along with most recent update on progress.

## 2021-10-15 ENCOUNTER — TELEPHONE (OUTPATIENT)
Dept: ORTHOPEDIC SURGERY | Age: 31
End: 2021-10-15

## 2021-10-15 NOTE — TELEPHONE ENCOUNTER
RONEY MICHELLE / [facility] NOTES 03/01/2021 THRU 10/14/2021 IN TO  FOR PATIENT TO  AT [facility].   CALLED PATIENT LETTING HER KNOW RECORDS ARE READY FOR

## 2021-10-20 ENCOUNTER — HOSPITAL ENCOUNTER (OUTPATIENT)
Dept: PHYSICAL THERAPY | Age: 31
Setting detail: THERAPIES SERIES
Discharge: HOME | End: 2021-10-20
Payer: COMMERCIAL

## 2021-10-20 PROCEDURE — 97110 THERAPEUTIC EXERCISES: CPT

## 2021-10-20 PROCEDURE — 97112 NEUROMUSCULAR REEDUCATION: CPT

## 2021-10-20 PROCEDURE — 97530 THERAPEUTIC ACTIVITIES: CPT

## 2021-10-20 NOTE — FLOWSHEET NOTE
The [address] [place] [address],[address], [address]  Phone: [phone]   Fax:     [phone]      Physical Therapy Progress/Daily Treatment Note  Date:  10/20/2021    Patient Name:  Salma Villalta    :  1990  MRN: [id number]  Restrictions/Precautions:    Medical/Treatment Diagnosis Information:  · Diagnosis: S83.511A (ICD-10-CM) - Rupture of anterior cruciate ligament of right knee, initial encounter  · Treatment Diagnosis: M25.561 Right knee pain  Insurance/Certification information:  PT Insurance Information: [employer]  Physician Information:  Referring Practitioner: Marley Dumont MD  Has the plan of care been signed (Y/N):        [x]  Yes  []  No     Date of Patient follow up with Physician:       Is this a Progress Report:     []  Yes  [x]  No        If Yes:  Date Range for reporting period:  Beginning 21  Ending 21    Progress report will be due (10 Rx or 30 days whichever is less): 00/3/26      Recertification will be due (POC Duration  / 90 days whichever is less): 21         Visit # Insurance Allowable Auth Required   6  6 of 8 visits  21 visits total [employer]   8 visits  -  12 visits eval included end date 21 [x]  Yes []  No        Functional Scale:   LEFS 41% deficit Date assessed: 21  LEFS 46% deficit Date assessed: 21  LEFS 45% deficit Date assessed: 21  LEFS 59% deficit Date assessed: 21  LEFS 65% deficit Date assessed: 21  LEFS 89% deficit Date assessed:  21       Latex Allergy:  [x]NO      []YES  Preferred Language for Healthcare:   [x]English       []other:    Pain level:  0/10 10/20    SUBJECTIVE:  10/20 Pt reports symptoms are about the same since last visit. Still has stiffness in the morning. Attempting to complete eccentric strengthening in weight bearing but still feels a lot of pressure at anterior aspect of knee.       OBJECTIVE:   Observation: 9/292/21   DL squat demonstrated weight shift to L   SL squat 75% decrease in depth on R compared to L with valgus and excessive forward knee flexion noted   Test measurements: 9/29/21  ROM PROM AROM Overpressure Comment     L R   L R  L R     Flexion   152  153 150        Extension   +2 +5 +2          Strength L R Comment   Quad 5 5     Hamstring 5 5     Hip  flexion  5 5     Hip abd  5 5     Hip add 5 5    Hip ext 5 4+          Girth L R   Mid Patella 34.5 cm 34.5 cm     Gait: Independent and WNL after cueing. When getting up from sedentary position she demonstrates occasional hip circumduction that improves as she walks more. Incisions: Incision closed with no s/s of infection   Palpation: Tenderness Distal ITB at lateral femoral condyle   RESTRICTIONS/PRECAUTIONS: S/P R ACL with BPTB graft, medial and lateral meniscus tear 5/12/21    Exercises/Interventions:   Exercise/Equipment Resistance/Repetitions Other comments   Stretching/AROM       Ankle pumps 5/19 cont with HEP    Bike x5' AROM  7/2 able to complete full revolutions    Calf stretch 30\"hx3 R ^6/15 slant board with protected weight bearing, bilateral axillary crutches.     Hamstring  30\"hx3 R 5/25 Seated EOT, 1/2 roll under ankle    Prone quad strertch 30\"hx3 R7/30   ITB stretch 30\"hx3 R 10/6   Heel slides 7/2 reintroduced d/t decrease in squeak   SB roll ins 6/29 withheld d/t audible squeak at patellofemoral joint   Knee flexion PROM  6/29 completed with assist from L LE at EOT   Hip flexor stretch  7/2   Heel raises  6/15 at table with bilateral UE support   Strengthening      SLR Blue TB 3x10 10/14 emphasis on eccentric control with lower   SLR abd 10/14 cont with HEP   SLR add 10/14 cont with HEP   SLR ext 10/14 cont with HEP   SLR + 30\"hx5 R ^10/14 5#   ^9/29 full ROM   Stool scoots   9/15   Side stepping  Lal 3 laps9/29   Band walks Lal 3 laps9/29   Hamstring curl ^8/2 standing   SL bridge 7/30   Clams  ^7/30   Neuromuscular re-ed      Lateral lunge with slider 3x10 R ^10/20    SL running balance 3x10 R 10/20 R UE support and L toe support with eccentric lower on R    LTD 3x10 R Blue loop around knees ^10/20 completed on 4\" step   TRX  DL squat 30\"hx5 ^10/6 Gray TB around knees   SL closed chain [id number] R10/14 standing on airex, cone touch with R hand   9/29 completed on 4\" step    SLS with hip abd 3x10 blue versa loop ^10/20 completed on airex   Marching  7/13   Step up 3x10 R forward  3x10 R lateral ^9/29 10\" step   Plyo toss trampoline   8/3   Wall sits  9/15 withheld d/t fatigue    Quantum machines       Leg press  90# 3x10 SL R  110# 3x10 eccentric lower on R ^10/20  ^10/20    Leg extension 20# 3x10 ECC lower on R ^10/10 90<>45 deg,    Leg curl 30# 3x10 SL R ^10/20   ^8/2   10/6 Completed with R LE only   Manual interventions        [facility]  10/14 withheld d/t     Martin  R 7/16       Blood Flow Restriction Training  Smart Cuff Size: 4  LOP: 212  PTP (60-80% of LOP): 170 (80%)  Exercise Repetition Weight Repetitions                                      Therapeutic Exercise and NMR EXR  [x] (87408) Provided verbal/tactile cueing for activities related to strengthening, flexibility, endurance, ROM for improvements in LE, proximal hip, and core control with self care, mobility, lifting, ambulation.  [] (10883) Provided verbal/tactile cueing for activities related to improving balance, coordination, kinesthetic sense, posture, motor skill, proprioception  to assist with LE, proximal hip, and core control in self care, mobility, lifting, ambulation and eccentric single leg control.      NMR and Therapeutic Activities:    [] (82787 or 52079) Provided verbal/tactile cueing for activities related to improving balance, coordination, kinesthetic sense, posture, motor skill, proprioception and motor activation to allow for proper function of core, proximal hip and LE with self care and ADLs  [] (61868) Gait Re-education- Provided training and instruction to the patient for proper LE, core and proximal hip recruitment and positioning and eccentric body weight control with ambulation re-education including up and down stairs     Home Exercise Program:    [x] (64589) Reviewed/Progressed HEP activities related to strengthening, flexibility, endurance, ROM of core, proximal hip and LE for functional self-care, mobility, lifting and ambulation/stair navigation   [] (35770)Reviewed/Progressed HEP activities related to improving balance, coordination, kinesthetic sense, posture, motor skill, proprioception of core, proximal hip and LE for self care, mobility, lifting, and ambulation/stair navigation      Manual Treatments:  PROM / STM / Oscillations-Mobs:  G-I, II, III, IV (PA's, Inf., Post.)  [] (71860) Provided manual therapy to mobilize LE, proximal hip and/or LS spine soft tissue/joints for the purpose of modulating pain, promoting relaxation,  increasing ROM, reducing/eliminating soft tissue swelling/inflammation/restriction, improving soft tissue extensibility and allowing for proper ROM for normal function with self care, mobility, lifting and ambulation. Modalities:  CP R knee x10' 10/2021    Charges:  Timed Code Treatment Minutes: 48'   Total Treatment Minutes: 11:20-12:35  75'       [] EVAL (LOW) 96428 (typically 20 minutes face-to-face)  [] EVAL (MOD) 31348 (typically 30 minutes face-to-face)  [] EVAL (HIGH) 32019 (typically 45 minutes face-to-face)  [] RE-EVAL   [x] MZ(02035) x 2    [] IONTO  [x] NMR (28766) x 1    [] VASO  [] Manual (79281) x      [] Other:  [x] TA x 1     [] [facility] (41643[facility]  [] ES(attended) (47508)      [] ES (un) (30840):     GOALS:   Patient stated goal: Return to [employer]    [x]? Progressing: []? Met: []? Not Met: []? Adjusted     Therapist goals for Patient:   Short Term Goals: To be achieved in: 8 weeks  1. Independent in HEP and progression per patient tolerance, in order to prevent re-injury. []? Progressing: [x]?  Met: []? Not Met: []? Adjusted   2. Patient will have a decrease in pain to facilitate improvement in movement, function, and ADLs as indicated by Functional Deficits. []? Progressing: [x]? Met: []? Not Met: []? Adjusted     Long Term Goals: To be achieved in: 16 weeks  1. Disability index score of 20% or less for the LEFS to assist with reaching prior level of function. [x]? Progressing: []? Met: []? Not Met: []? Adjusted  2. Patient will demonstrate increased R knee AROM that is equal to L to allow for proper joint functioning as indicated by patients Functional Deficits. []? Progressing: [x]? Met: []? Not Met: []? Adjusted  3. Patient will demonstrate an increase in R knee strength that is equal to L good proximal hip strength and control  in LE to allow for proper functional mobility as indicated by patients Functional Deficits. [x]? Progressing: []? Met: []? Not Met: []? Adjusted  4. Patient will return to all functional activities without increased symptoms or restriction. [x]? Progressing: []? Met: []? Not Met: []? Adjusted  5. Patient will complete [employer] Isokinetic strength with 25% or less deficit of quadriceps and hamsrting strength compared to L as well as PKTQ/BW of 40% or greater of quadriceps and 30% of hamstring on L in order to begin return to running progression  []? Progressing: []? Met: []? Not Met: [x]? Adjusted     Have not yet tested d/t slower progression and will test in 2-4 weeks based on pt progress and presentation. Overall Progression Towards Functional goals/ Treatment Progress Update:  [] Patient is progressing as expected towards functional goals listed with increase strength and ROM that is appropriate based on MD orders. [x] Progression overall has been slow d/t complexities associated with surgery but is continuing to progress with increase in strength, ROM and overall function.  Based on surgical intervention, objective deficits and MD protocol she is appropriate for continued PT to further increase strength and stability allowing for protection of surgical intervention as well as increase in functional independence 9/29/21  [] Progression has been slowed due to co-morbidities. [] Plan just implemented, too soon to assess goals progression <30days   [] Goals require adjustment due to lack of progress  [] Patient is not progressing as expected and requires additional follow up with physician  [] Other    Prognosis for POC: [x] Good [] Fair  [] Poor      Patient requires continued skilled intervention: [x] Yes  [] No    Treatment/Activity Tolerance:  [x] Patient able to complete treatment  [] Patient limited by fatigue  [] Patient limited by some pain     [] Patient limited by other medical complications  [x] Other: 10/20: Fatigued with increase in intensity of treatment but demonstrated better control and awareness with eccentric strengthening and dynamic balance of R LE. Responded well to treatment with no adverse effects. Patient Education:           10/14 Educated on eccentric strengthening of quad. 9/29 Updated HEP based on tolerance to treatment  8/25 Updated HEP based on treatment  8/18 Updated HEP based on tolerance to treatment  8/2 Updated HEP   7/23 Reviewed proper gait sequencing and activity modification with walking around neighborhood to decrease risk of increase in symptoms. Updated HEP based on tolerance to treatment. 7/16 educated on decreasing ambulation with no AD and to revert to 1 crutch around the house if symptoms in R knee increase or increased antalgic gait with no AD.  7/13 Educated on proper use of unilateral crutch for community ambulation but to attempt ambulating around house without AD as tolerated. 7/6 Educated on activity modification in order to decrease swelling and improve quad activation. Pt to ambulate with unilateral crutch as tolerated. 6/29 Increased weight with HEP.  Educated on self patellar mobilizations for home based on MD orders  6/23 Updated HEP, reviewed continued use of ice especially with increase time spent in standing. Stressed importance of decreasing swelling to improve knee ROM and quad activation. 6/11 Educated on 25% weight bearing and proper sequencing during gait. 5/27 Updated HEP and reviewed precuations. 5/25 Reviewed appropriate knee flexion ROM and proper fit of TROM. 5/19 Educated on precautions with knee flexion ROM and to not push through pain but work on gentle ROM. Educated on continued use of ice to decrease swelling and for pain control as she progresses off of pain medication   5/12 Educated on importance of ice/elevation to decrease swelling as well as proper stair negotiation with crutches and non weight bearing on R. Also reviewed importance of consistency with HEP. HEP instruction:   Access Code: Rosalee Shaper           PLAN: See eval  [x] Continue per plan of care [] Alter current plan (see comments above)  [x] Plan of care initiated [] Hold pending MD visit [] Discharge  10/20 Monitor symptoms and continue to push strengthening and dynamic balance. Electronically signed by:  Augie Hi PT    Note: If patient does not return for scheduled/ recommended follow up visits, this note will serve as a discharge from care along with most recent update on progress.

## 2021-10-29 ENCOUNTER — HOSPITAL ENCOUNTER (OUTPATIENT)
Dept: PHYSICAL THERAPY | Age: 31
Setting detail: THERAPIES SERIES
Discharge: HOME | End: 2021-10-29
Payer: COMMERCIAL

## 2021-10-29 PROCEDURE — 97110 THERAPEUTIC EXERCISES: CPT

## 2021-10-29 PROCEDURE — 97750 PHYSICAL PERFORMANCE TEST: CPT

## 2021-10-29 PROCEDURE — 97530 THERAPEUTIC ACTIVITIES: CPT

## 2021-10-29 NOTE — PROGRESS NOTES
The [address] [place] [address] [facility], [address] [facility] [address], [address] [place] [address]  Phone: [phone]   Fax:     [phone]     Physical Therapy Re-Certification Plan of Care    Dear Dr. Israel Haley,    We had the pleasure of treating the following patient for physical therapy services at [address] [place] [address]. A summary of our findings can be found in the updated assessment below. This includes our plan of care. If you have any questions or concerns regarding these findings, please do not hesitate to contact me at the office phone number checked above. Thank you for the referral.     Physician Signature:________________________________Date:__________________  By signing above (or electronic signature), therapists plan is approved by physician      Overall Response to Treatment:   []Patient is responding well to treatment and improvement is noted with regards  to goals   []Patient should continue to improve in reasonable time if they continue HEP   []Patient has plateaued and is no longer responding to skilled PT intervention    []Patient is getting worse and would benefit from return to referring MD   []Patient unable to adhere to initial POC   [x]Other: Patient is progressing overall with increase motion and strength resulting in mild increase in function and return to work. She still has significant strength deficits noted during isokinetic testing as well as significant restrictions with SL squat causing functional limitations. She also has been experiencing soreness/ discomfort @ R lateral knee with work d/t increase time spent in weight bearing. Progress has been slow d/t complexity of surgery as well as complications after surgery causing delay in strengthening. Based on progress and continued limitations she is appropriate for further PT.        Physical Therapy Progress/Daily Treatment Note  Date:  10/29/2021    Patient Name: flexion noted   Test measurements: 10/29/21  ROM PROM AROM Overpressure Comment     L R   L R  L R     Flexion   152  153 150        Extension   +2 +5 +2          Strength L R Comment   Quad 5 5     Hamstring 5 5     Hip  flexion  5 5     Hip abd  5 5     Hip add 5 5    Hip ext 5 5          Girth L R   Mid Patella 34.5 cm 34.5 cm     Gait: Independent and WNL    Incisions: Incision closed with no s/s of infection   Palpation: Tenderness Distal ITB on L     ISOKINETIC TESTING  Bilateral Difference:  Quadricep 180 deg/sec: 63% [x] Deficit   [] Surplus 300 deg/sec: 48% [x] Deficit   [] Surplus   Hamstring 180 deg/sec: 24.7% [x] Deficit   [] Surplus 300 deg/sec: 21.5% [x] Deficit   [] Surplus     Normative Data, 180 degrees/second:  Quadricep Normal: 55-60% peak TQ/BW Patient: 20.5   Hamstring Normal: 45-55% peak TQ/BW Patient: 22.5     Normative Data, 300 degrees/second:  Quadricep Normal: 45-55% peak TQ/BW Patient: 21   Hamstring Normal: 40-45% peak TQ/BW Patient: 18.2       RESTRICTIONS/PRECAUTIONS: S/P R ACL with BPTB graft, medial and lateral meniscus tear 5/12/21    Exercises/Interventions:   Exercise/Equipment Resistance/Repetitions Other comments   Stretching/AROM       Ankle pumps 5/19 cont with HEP    Bike x5' AROM  7/2 able to complete full revolutions    Calf stretch 30\"hx3 R ^6/15 slant board with protected weight bearing, bilateral axillary crutches.     Hamstring  30\"hx3 R 5/25 Seated EOT, 1/2 roll under ankle    Prone quad strertch 30\"hx3 R7/30   ITB stretch 30\"hx3 R 10/6   Heel slides 7/2 reintroduced d/t decrease in squeak   SB roll ins 6/29 withheld d/t audible squeak at patellofemoral joint   Knee flexion PROM  6/29 completed with assist from L LE at EOT   Hip flexor stretch  7/2   Heel raises  6/15 at table with bilateral UE support   Strengthening      SLR  10/14 emphasis on eccentric control with lower   SLR abd 10/14 cont with HEP   SLR add 10/14 cont with HEP   SLR ext 10/14 cont with HEP   SLR + ^10/14 5#   ^9/29 full ROM   Stool scoots   9/15   Side stepping  Lal 3 laps9/29   Band walks Clevester Leah 3 laps9/29   Hamstring curl ^8/2 standing   SL bridge 7/30   Clams  ^7/30   Neuromuscular re-ed      Lateral lunge with slider 3x10 R ^10/20    SL running balance 3x10 R 10/20 R UE support and L toe support with eccentric lower on R    LTD 3x10 R Blue loop around knees ^10/20 completed on 4\" step   TRX  ^10/6 Gray TB around knees   SL closed chain [id number] standing on airex, cone touch with R hand   9/29 completed on 4\" step    SLS with hip abd  ^10/20 completed on airex   Marching  7/13   Step up  ^9/29 10\" step   Plyo toss trampoline   8/3   Wall sits  9/15 withheld d/t fatigue    Quantum machines       Leg press  90# 3x10 SL R  120# 3x10 eccentric lower on R ^10/20  ^10/29   Leg extension 20# 3x10 ECC lower on R ^10/10 90<>45 deg,    Leg curl 30# 3x10 SL R ^10/20   ^8/2   10/6 Completed with R LE only   Manual interventions        STM  10/14 withheld d/t     Martin  R 7/16                   Therapeutic Exercise and NMR EXR  [x] (19325) Provided verbal/tactile cueing for activities related to strengthening, flexibility, endurance, ROM for improvements in LE, proximal hip, and core control with self care, mobility, lifting, ambulation.  [] (78245) Provided verbal/tactile cueing for activities related to improving balance, coordination, kinesthetic sense, posture, motor skill, proprioception  to assist with LE, proximal hip, and core control in self care, mobility, lifting, ambulation and eccentric single leg control.      NMR and Therapeutic Activities:    [] (98267 or 04726) Provided verbal/tactile cueing for activities related to improving balance, coordination, kinesthetic sense, posture, motor skill, proprioception and motor activation to allow for proper function of core, proximal hip and LE with self care and ADLs  [] (54154) Gait Re-education- Provided training and instruction to the patient for proper LE, core and proximal hip recruitment and positioning and eccentric body weight control with ambulation re-education including up and down stairs     Home Exercise Program:    [x] (48548) Reviewed/Progressed HEP activities related to strengthening, flexibility, endurance, ROM of core, proximal hip and LE for functional self-care, mobility, lifting and ambulation/stair navigation   [] (03003)Reviewed/Progressed HEP activities related to improving balance, coordination, kinesthetic sense, posture, motor skill, proprioception of core, proximal hip and LE for self care, mobility, lifting, and ambulation/stair navigation      Manual Treatments:  PROM / STM / Oscillations-Mobs:  G-I, II, III, IV (PA's, Inf., Post.)  [] (24258) Provided manual therapy to mobilize LE, proximal hip and/or LS spine soft tissue/joints for the purpose of modulating pain, promoting relaxation,  increasing ROM, reducing/eliminating soft tissue swelling/inflammation/restriction, improving soft tissue extensibility and allowing for proper ROM for normal function with self care, mobility, lifting and ambulation. Modalities:      Charges:  Timed Code Treatment Minutes: 64'   Total Treatment Minutes: 9:54-11:20  80'       [] EVAL (LOW) 80611 (typically 20 minutes face-to-face)  [] EVAL (MOD) 41951 (typically 30 minutes face-to-face)  [] EVAL (HIGH) 48227 (typically 45 minutes face-to-face)  [] RE-EVAL   [x] XQ(19104) x 2    [] IONTO  [x] NMR (62688) x     [] VASO  [] Manual (25306) x      [x] Other: Performance test  [x] TA x 1     [] Mech Traction (88350)  [] ES(attended) (16893)      [] ES (un) (31978):     GOALS:   Patient stated goal: Return to [employer]    [x]? Progressing: []? Met: []? Not Met: []? Adjusted     Therapist goals for Patient:   Short Term Goals: To be achieved in: 8 weeks  1. Independent in HEP and progression per patient tolerance, in order to prevent re-injury. []? Progressing: [x]? Met: []? Not Met: []?  Adjusted 2. Patient will have a decrease in pain to facilitate improvement in movement, function, and ADLs as indicated by Functional Deficits. []? Progressing: [x]? Met: []? Not Met: []? Adjusted     Long Term Goals: To be achieved in: 16 weeks  1. Disability index score of 20% or less for the LEFS to assist with reaching prior level of function. [x]? Progressing: []? Met: []? Not Met: []? Adjusted  2. Patient will demonstrate increased R knee AROM that is equal to L to allow for proper joint functioning as indicated by patients Functional Deficits. []? Progressing: [x]? Met: []? Not Met: []? Adjusted  3. Patient will demonstrate an increase in R knee strength that is equal to L good proximal hip strength and control  in LE to allow for proper functional mobility as indicated by patients Functional Deficits. []? Progressing: [x]? Met: []? Not Met: []? Adjusted  4. Patient will return to all functional activities without increased symptoms or restriction. [x]? Progressing: []? Met: []? Not Met: []? Adjusted  5. Patient will complete Biodex Isokinetic strength with 25% or less deficit of quadriceps and hamsrting strength compared to L as well as PKTQ/BW of 40% or greater of quadriceps and 30% of hamstring on L in order to begin return to running progression  []? Progressing: []? Met: [x]? Not Met: []? Adjusted         Overall Progression Towards Functional goals/ Treatment Progress Update:  [] Patient is progressing as expected towards functional goals listed with increase strength and ROM that is appropriate based on MD orders. [x] Progression overall has been slow d/t complexities associated with surgery but is continuing to progress with increase in strength, ROM and overall function.  Based on surgical intervention, objective deficits and MD protocol she is appropriate for continued PT to further increase strength and stability allowing for protection of surgical intervention as well as increase in functional independence 10/29/21  [] Progression has been slowed due to co-morbidities. [] Plan just implemented, too soon to assess goals progression <30days   [] Goals require adjustment due to lack of progress  [] Patient is not progressing as expected and requires additional follow up with physician  [] Other    Prognosis for POC: [x] Good [] Fair  [] Poor      Patient requires continued skilled intervention: [x] Yes  [] No    Treatment/Activity Tolerance:  [x] Patient able to complete treatment  [] Patient limited by fatigue  [] Patient limited by some pain     [] Patient limited by other medical complications  [x] Other: 10/29 Mild increase in distal ITB tenderness d/t working three consecutive days. D/t discomfort she was challenged with SL squat activities but was able to complete without any increase in symptoms. Limited exercises during treatment d/t completing Biodex isokinetic testing. Patient Education:           10/14 Educated on eccentric strengthening of quad. 9/29 Updated HEP based on tolerance to treatment  8/25 Updated HEP based on treatment  8/18 Updated HEP based on tolerance to treatment  8/2 Updated HEP   7/23 Reviewed proper gait sequencing and activity modification with walking around neighborhood to decrease risk of increase in symptoms. Updated HEP based on tolerance to treatment. 7/16 educated on decreasing ambulation with no AD and to revert to 1 crutch around the house if symptoms in R knee increase or increased antalgic gait with no AD.  7/13 Educated on proper use of unilateral crutch for community ambulation but to attempt ambulating around house without AD as tolerated. 7/6 Educated on activity modification in order to decrease swelling and improve quad activation. Pt to ambulate with unilateral crutch as tolerated. 6/29 Increased weight with HEP.  Educated on self patellar mobilizations for home based on MD orders  6/23 Updated HEP, reviewed continued use of ice especially with increase time spent in standing. Stressed importance of decreasing swelling to improve knee ROM and quad activation. 6/11 Educated on 25% weight bearing and proper sequencing during gait. 5/27 Updated HEP and reviewed precuations. 5/25 Reviewed appropriate knee flexion ROM and proper fit of TROM. 5/19 Educated on precautions with knee flexion ROM and to not push through pain but work on gentle ROM. Educated on continued use of ice to decrease swelling and for pain control as she progresses off of pain medication   5/12 Educated on importance of ice/elevation to decrease swelling as well as proper stair negotiation with crutches and non weight bearing on R. Also reviewed importance of consistency with HEP. HEP instruction:   Access Code: Umm Maria           PLAN: See eval  [x] Continue per plan of care [] Alter current plan (see comments above)  [x] Plan of care initiated [] Hold pending MD visit [] Discharge  10/29 Pt is appropriate for continued PT 1x a week for 6 more weeks to progress strengthening program in order to protect surgical repair and progress overall function. She has exhausted insurance visits based on dates provided and will complete HEP independently while awaiting approval for more visits.              Electronically signed by:  Alverto Bailey, PT    Note: If patient does not return for scheduled/ recommended follow up visits, this note will serve as a discharge from care along with most recent update on progress

## 2021-11-12 ENCOUNTER — HOSPITAL ENCOUNTER (OUTPATIENT)
Dept: PHYSICAL THERAPY | Age: 31
Setting detail: THERAPIES SERIES
Discharge: HOME | End: 2021-11-12
Payer: COMMERCIAL

## 2021-11-12 PROCEDURE — 97530 THERAPEUTIC ACTIVITIES: CPT

## 2021-11-12 PROCEDURE — 97110 THERAPEUTIC EXERCISES: CPT

## 2021-11-12 PROCEDURE — 97112 NEUROMUSCULAR REEDUCATION: CPT

## 2021-11-12 NOTE — FLOWSHEET NOTE
no wegith shift and proper form noted. SL squat 75% decrease in depth on R compared to L with valgus and excessive forward knee flexion noted   Test measurements: 10/29/21  ROM PROM AROM Overpressure Comment     L R   L R  L R     Flexion   152  153 150        Extension   +2 +5 +2          Strength L R Comment   Quad 5 5     Hamstring 5 5     Hip  flexion  5 5     Hip abd  5 5     Hip add 5 5    Hip ext 5 5          Girth L R   Mid Patella 34.5 cm 34.5 cm     Gait: Independent and WNL    Incisions: Incision closed with no s/s of infection   Palpation: Tenderness Distal ITB on L     ISOKINETIC TESTING  Bilateral Difference:  Quadricep 180 deg/sec: 63% [x] Deficit   [] Surplus 300 deg/sec: 48% [x] Deficit   [] Surplus   Hamstring 180 deg/sec: 24.7% [x] Deficit   [] Surplus 300 deg/sec: 21.5% [x] Deficit   [] Surplus     Normative Data, 180 degrees/second:  Quadricep Normal: 55-60% peak TQ/BW Patient: 20.5   Hamstring Normal: 45-55% peak TQ/BW Patient: 22.5     Normative Data, 300 degrees/second:  Quadricep Normal: 45-55% peak TQ/BW Patient: 21   Hamstring Normal: 40-45% peak TQ/BW Patient: 18.2       RESTRICTIONS/PRECAUTIONS: S/P R ACL with BPTB graft, medial and lateral meniscus tear 5/12/21    Exercises/Interventions:   Exercise/Equipment Resistance/Repetitions Other comments   Stretching/AROM       Ankle pumps 5/19 cont with HEP    Bike x5' AROM  7/2 able to complete full revolutions    Calf stretch 30\"hx3 R ^6/15 slant board with protected weight bearing, bilateral axillary crutches.     Hamstring  30\"hx3 R 5/25 Seated EOT, 1/2 roll under ankle    Prone quad strertch 30\"hx3 R7/30   ITB stretch  10/6   Heel slides 7/2 reintroduced d/t decrease in squeak   SB roll ins 6/29 withheld d/t audible squeak at patellofemoral joint   Knee flexion PROM  6/29 completed with assist from L LE at EOT   Hip flexor stretch  7/2   Heel raises  6/15 at table with bilateral UE support   Strengthening      SLR  10/14 emphasis on eccentric control with lower   SLR abd 10/14 cont with HEP   SLR add 10/14 cont with HEP   SLR ext 10/14 cont with HEP   SLR + 30\"hx5 R 11/11 5#   ^9/29 full ROM   Stool scoots   9/15   Side stepping  9/29   Band walks 9/29   Hamstring curl ^8/2 standing   SL bridge 7/30   Clams  ^7/30   Neuromuscular re-ed      SL Y reach Attempted in front of mirror and with TRX but withheld d/t fatigue and improve sequencing. 11/12   Lateral lunge with slider 3x10 R ^10/20    SL running balance 3x10 R 10/20 R UE support and L toe support with eccentric lower on R    LTD 3x10 R  ^11/12 4\" step, cues for proper activation    TRX  ^10/6 Nestor Reel TB around knees   SL closed chain [id number] standing on airex, cone touch with R hand   9/29 completed on 4\" step    SLS with hip abd  ^10/20 completed on airex   Marching  7/13   Step up 3x10 R forward 11/12 completed on [employer]  2# med ball  11/12   Wall sits  9/15 withheld d/t fatigue    Quantum machines       Leg press  100# 3x10 SL R  120# 3x10 eccentric lower on R ^11/12  ^10/29   Leg extension 15# 5x5 SL R 11/12 90<>45   Leg curl 35# 5x5 SL R ^11/12   ^8/2   10/6 Completed with R LE only   Manual interventions        STM  10/14 withheld d/t     Mario  R 7/16                   Therapeutic Exercise and NMR EXR  [x] (92754) Provided verbal/tactile cueing for activities related to strengthening, flexibility, endurance, ROM for improvements in LE, proximal hip, and core control with self care, mobility, lifting, ambulation.  [] (50594) Provided verbal/tactile cueing for activities related to improving balance, coordination, kinesthetic sense, posture, motor skill, proprioception  to assist with LE, proximal hip, and core control in self care, mobility, lifting, ambulation and eccentric single leg control.      NMR and Therapeutic Activities:    [] (38196 or 58645) Provided verbal/tactile cueing for activities related to improving balance, coordination, kinesthetic sense, posture, motor skill, proprioception and motor activation to allow for proper function of core, proximal hip and LE with self care and ADLs  [] (38814) Gait Re-education- Provided training and instruction to the patient for proper LE, core and proximal hip recruitment and positioning and eccentric body weight control with ambulation re-education including up and down stairs     Home Exercise Program:    [x] (30980) Reviewed/Progressed HEP activities related to strengthening, flexibility, endurance, ROM of core, proximal hip and LE for functional self-care, mobility, lifting and ambulation/stair navigation   [] (02379)Reviewed/Progressed HEP activities related to improving balance, coordination, kinesthetic sense, posture, motor skill, proprioception of core, proximal hip and LE for self care, mobility, lifting, and ambulation/stair navigation      Manual Treatments:  PROM / STM / Oscillations-Mobs:  G-I, II, III, IV (PA's, Inf., Post.)  [] (14763) Provided manual therapy to mobilize LE, proximal hip and/or LS spine soft tissue/joints for the purpose of modulating pain, promoting relaxation,  increasing ROM, reducing/eliminating soft tissue swelling/inflammation/restriction, improving soft tissue extensibility and allowing for proper ROM for normal function with self care, mobility, lifting and ambulation. Modalities:  CP L knee x10' 11/12    Charges:  Timed Code Treatment Minutes: 54'   Total Treatment Minutes: 8:23-9:40  68'       [] EVAL (LOW) 23692 (typically 20 minutes face-to-face)  [] EVAL (MOD) 91719 (typically 30 minutes face-to-face)  [] EVAL (HIGH) 12566 (typically 45 minutes face-to-face)  [] RE-EVAL   [x] XZ(67535) x 1    [] IONTO  [x] NMR (02051) x 1    [] VASO  [] Manual (35064) x      [] Other: Performance test  [x] TA x 2     [] Mech Traction (94030)  [] ES(attended) (19980)      [] ES (un) (82126):     GOALS:   Patient stated goal: Return to [employer]    [x]? Progressing: []? Met: []?  Not Met: []? Adjusted     Therapist goals for Patient:   Short Term Goals: To be achieved in: 8 weeks  1. Independent in HEP and progression per patient tolerance, in order to prevent re-injury. []? Progressing: [x]? Met: []? Not Met: []? Adjusted   2. Patient will have a decrease in pain to facilitate improvement in movement, function, and ADLs as indicated by Functional Deficits. []? Progressing: [x]? Met: []? Not Met: []? Adjusted     Long Term Goals: To be achieved in: 16 weeks  1. Disability index score of 20% or less for the LEFS to assist with reaching prior level of function. [x]? Progressing: []? Met: []? Not Met: []? Adjusted  2. Patient will demonstrate increased R knee AROM that is equal to L to allow for proper joint functioning as indicated by patients Functional Deficits. []? Progressing: [x]? Met: []? Not Met: []? Adjusted  3. Patient will demonstrate an increase in R knee strength that is equal to L good proximal hip strength and control  in LE to allow for proper functional mobility as indicated by patients Functional Deficits. []? Progressing: [x]? Met: []? Not Met: []? Adjusted  4. Patient will return to all functional activities without increased symptoms or restriction. [x]? Progressing: []? Met: []? Not Met: []? Adjusted  5. Patient will complete [employer] Isokinetic strength with 25% or less deficit of quadriceps and hamsrting strength compared to L as well as PKTQ/BW of 40% or greater of quadriceps and 30% of hamstring on L in order to begin return to running progression  []? Progressing: []? Met: [x]? Not Met: []? Adjusted         Overall Progression Towards Functional goals/ Treatment Progress Update:  [] Patient is progressing as expected towards functional goals listed with increase strength and ROM that is appropriate based on MD orders.      [x] Progression overall has been slow d/t complexities associated with surgery but is continuing to progress with increase in strength, ROM and overall function. Based on surgical intervention, objective deficits and MD protocol she is appropriate for continued PT to further increase strength and stability allowing for protection of surgical intervention as well as increase in functional independence 10/29/21  [] Progression has been slowed due to co-morbidities. [] Plan just implemented, too soon to assess goals progression <30days   [] Goals require adjustment due to lack of progress  [] Patient is not progressing as expected and requires additional follow up with physician  [] Other    Prognosis for POC: [x] Good [] Fair  [] Poor      Patient requires continued skilled intervention: [x] Yes  [] No    Treatment/Activity Tolerance:  [x] Patient able to complete treatment  [] Patient limited by fatigue  [] Patient limited by some pain     [] Patient limited by other medical complications  [x] Other: 11/12 Pt continues to be challenged with eccentric strengthening of quad in weight bearing. Attempted SL Y reach but pt was apprehensive and could not complete without significant compensation. Cues and unilateral UE support required with LTD but pt was able to complete with appropriate fatigue and no c/o pain. Pt experienced mild discomfort at distal ITB and vastus lateralis after weight machines that improved with pt administering active release to area. Overall responded well to increase in intensity of treatment and encouraged to focus on proper form at home with LTD. Patient Education:           10/14 Educated on eccentric strengthening of quad. 9/29 Updated HEP based on tolerance to treatment  8/25 Updated HEP based on treatment  8/18 Updated HEP based on tolerance to treatment  8/2 Updated HEP   7/23 Reviewed proper gait sequencing and activity modification with walking around neighborhood to decrease risk of increase in symptoms. Updated HEP based on tolerance to treatment.    7/16 educated on decreasing ambulation with no AD and to revert to 1 crutch around the house if symptoms in R knee increase or increased antalgic gait with no AD.  7/13 Educated on proper use of unilateral crutch for community ambulation but to attempt ambulating around house without AD as tolerated. 7/6 Educated on activity modification in order to decrease swelling and improve quad activation. Pt to ambulate with unilateral crutch as tolerated. 6/29 Increased weight with HEP. Educated on self patellar mobilizations for home based on MD orders  6/23 Updated HEP, reviewed continued use of ice especially with increase time spent in standing. Stressed importance of decreasing swelling to improve knee ROM and quad activation. 6/11 Educated on 25% weight bearing and proper sequencing during gait. 5/27 Updated HEP and reviewed precuations. 5/25 Reviewed appropriate knee flexion ROM and proper fit of TROM. 5/19 Educated on precautions with knee flexion ROM and to not push through pain but work on gentle ROM. Educated on continued use of ice to decrease swelling and for pain control as she progresses off of pain medication   5/12 Educated on importance of ice/elevation to decrease swelling as well as proper stair negotiation with crutches and non weight bearing on R. Also reviewed importance of consistency with HEP. HEP instruction:   Access Code: Candelario Roman           PLAN: See eval  [x] Continue per plan of care [] Alter current plan (see comments above)  [x] Plan of care initiated [] Hold pending MD visit [] Discharge  11/12 Continue to push eccentric strengthening in weight bearing as tolerated. 10/29 Pt is appropriate for continued PT 1x a week for 6 more weeks to progress strengthening program in order to protect surgical repair and progress overall function. She has exhausted insurance visits based on dates provided and will complete HEP independently while awaiting approval for more visits.              Electronically signed by:  Aracelis Valdez PT    Note: If patient does not return for scheduled/ recommended follow up visits, this note will serve as a discharge from care along with most recent update on progress

## 2021-11-16 ENCOUNTER — HOSPITAL ENCOUNTER (OUTPATIENT)
Dept: PHYSICAL THERAPY | Age: 31
Setting detail: THERAPIES SERIES
Discharge: HOME | End: 2021-11-16
Payer: COMMERCIAL

## 2021-11-16 NOTE — FLOWSHEET NOTE
Physical Therapy  Cancellation/No-show Note  Patient Name:  Jordan Armstrong  :  1990   Date:  2021  Cancelled visits to date: 1  No-shows to date: 0    For today's appointment patient:  [x]  Cancelled  []  Rescheduled appointment  []  No-show     Reason given by patient:  [x]  Patient ill  []  Conflicting appointment  []  No transportation    []  Conflict with work  []  No reason given  []  Other:     Comments:      Electronically signed by:  Ren Burgess PT, PT

## 2021-11-30 ENCOUNTER — HOSPITAL ENCOUNTER (OUTPATIENT)
Dept: PHYSICAL THERAPY | Age: 31
Setting detail: THERAPIES SERIES
Discharge: HOME | End: 2021-11-30
Payer: COMMERCIAL

## 2021-11-30 ENCOUNTER — OFFICE VISIT (OUTPATIENT)
Dept: ORTHOPEDIC SURGERY | Age: 31
End: 2021-11-30
Payer: COMMERCIAL

## 2021-11-30 VITALS — HEIGHT: 63 IN | BODY MASS INDEX: 25.69 KG/M2 | WEIGHT: 145 LBS

## 2021-11-30 DIAGNOSIS — Z98.890 S/P ACL RECONSTRUCTION: Primary | ICD-10-CM

## 2021-11-30 PROCEDURE — 97110 THERAPEUTIC EXERCISES: CPT

## 2021-11-30 PROCEDURE — 99214 OFFICE O/P EST MOD 30 MIN: CPT | Performed by: ORTHOPAEDIC SURGERY

## 2021-11-30 PROCEDURE — 97112 NEUROMUSCULAR REEDUCATION: CPT

## 2021-11-30 PROCEDURE — 97530 THERAPEUTIC ACTIVITIES: CPT

## 2021-11-30 NOTE — PROGRESS NOTES
Chief Complaint  Follow-up (right knee. s/p acl )      History of Present Illness:  Salina Alegria is a pleasant 32 y.o. female who presents today for follow up evaluation of right knee pain. She is 6 months status post right arthroscopy with ACL reconstruction with patellar tendon autograft medial meniscus repair lateral meniscus repair on 5/11/2021. She has been compliant with post operative physical therapy. She reports some continued quad weakness compared contralaterally but overall doing well. No new injuries reported. Medical History:  Patient's medications, allergies, past medical, surgical, social and family histories were reviewed and updated as appropriate. Pertinent items are noted in HPI  Review of systems reviewed from Patient History Form completed today and available in the patient's chart under the Media tab. Pain Assessment  Location of Pain: Knee  Location Modifiers: Right  Severity of Pain: 0  Quality of Pain: Dull, Aching  Duration of Pain: A few minutes  Frequency of Pain: Intermittent  Aggravating Factors: Squatting  Limiting Behavior: Yes  Relieving Factors: Rest  Result of Injury: Yes  Work-Related Injury: No  Are there other pain locations you wish to document?: No    Past Medical History:   Diagnosis Date    Uncomplicated asthma         Past Surgical History:   Procedure Laterality Date    ANTERIOR CRUCIATE LIGAMENT REPAIR Right 5/11/2021    RIGHT KNEE ARTHROSOCPY, ANTERIOR CRUCIATE LIGAMENT RECONSTRUCTION PATELLA TENDON, MEDIAL AND LATERAL MENISCUS REPAIR performed by Jocelyn Menendez MD at [address] [facility] Right     orf right wrist        History reviewed. No pertinent family history.     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[address] Use  Smoking status: Never Smoker    Smokeless tobacco: Never Used   Vaping Use    Vaping Use: Never used   Substance and Sexual Activity    Alcohol use: Yes     Alcohol/week: 1.0 standard drink     Types: 1 Glasses of wine per week     Comment: 2-3 a week     Drug use: Not Currently    Sexual activity: None   Other Topics Concern    None   Social History Narrative    None     Social Determinants of Health     Financial Resource Strain:     Difficulty of Paying Living Expenses: Not on file   Food Insecurity:     Worried About Running Out of Food in the Last Year: Not on file    Anatoly of Food in the Last Year: Not on file   Transportation Needs:     Lack of Transportation (Medical): Not on file    Lack of Transportation (Non-Medical):  Not on file   Physical Activity:     Days of Exercise per Week: Not on file    Minutes of Exercise per Session: Not on file   Stress:     Feeling of Stress : Not on file   Social Connections:     Frequency of Communication with Friends and Family: Not on file    Frequency of Social Gatherings with Friends and Family: Not on file    Attends Gnosticism Services: Not on file    Active Member of [address] [place] [address] or Organizations: Not on file    Attends Club or Organization Meetings: Not on file    Marital Status: Not on file   Intimate Partner Violence:     Fear of Current or Ex-Partner: Not on file    Emotionally Abused: Not on file    Physically Abused: Not on file    Sexually Abused: Not on file   Housing Stability:     Unable to Pay for Housing in the Last Year: Not on file    Number of [place] in the Last Year: Not on file    Unstable Housing in the Last Year: Not on file       Current Outpatient Medications   Medication Sig Dispense Refill    ondansetron (ZOFRAN) 4 MG tablet Take 1 tablet by mouth every 8 hours as needed for Nausea or Vomiting 30 tablet 0    senna-docusate (PERICOLACE) 8.6-50 MG per tablet Take 2 tablets by mouth daily as needed for Constipation 30 tablet 0    aspirin 325 MG EC tablet Take 1 tablet by mouth daily for 21 days 21 tablet 0     No current facility-administered medications for this visit. Allergies   Allergen Reactions    Amoxicillin      Hives    Hydrocodone      Facial rash       Vital signs:  Ht 5' 3\" (1.6 m)   Wt 145 lb (65.8 kg)   BMI 25.69 kg/m²              Right knee examination:     Gait: No use of assistive devices. No antalgic gait.     Alignment: normal alignment.     Inspection/skin: Healed surgical incision     Palpation: Mild crepitus     Range of Motion: There is full range of motion.      Strength: Weak quadriceps     Effusion: No effusion or swelling present.      Ligamentous stability: No cruciate or collateral ligament instability.      Neurologic and vascular: Skin is warm and well-perfused. Sensation is intact to light-touch.      Special tests: Negative Kristine sign. Radiology:     Pertinent imaging was interpreted and reviewed with the patient. No new imaging was obtained during today's visit. Assessment :  35-year-old female 6 months status post right ACL reconstruction with patella tendon autograft medial meniscus repair lateral meniscus repair on 5/11/2021    Impression:  Encounter Diagnosis   Name Primary?  S/P ACL reconstruction Yes       Office Procedures:  No orders of the defined types were placed in this encounter. Plan: Pertinent imaging was reviewed. The etiology, natural history, and treatment options for the disorder were discussed. The roles of activity medication, antiinflammatories, injections, bracing, physical therapy, and surgical interventions were all described to the patient and questions were answered. She continues to have residual quad weakness. I do not feel she is ready for GAP at this time. She will continue strengthening in post operative physical therapy transitioning to [facility] when her strength improves.      I will see her back in 3 months, sooner if symptoms worsen. [facility] is in agreement with this plan. All questions were answered to patient's satisfaction and was encouraged to call with any further questions. Total time spent for evaluation, education and development of treatment plan: 30 minutes        IDonell ATC, am scribing for and in the presence of Dr. Sheba Neely. 11/30/21 5:17 PM Donell Madden ATC    I attest that I met personally with the patient, performed the described exam, reviewed the radiographic studies and medical records associated with this patient and supervised the services that are described above.      Do Tilley MD

## 2021-11-30 NOTE — PROGRESS NOTES
[address], [address] [place] [address], [address] [place] [address]  Phone: [phone]   Fax:     [phone]       Physical Therapy Progress/Daily Treatment Note  Date:  2021    Patient Name:  Margy Johnson    :  1990  MRN: [id number]  Restrictions/Precautions:    Medical/Treatment Diagnosis Information:  · Diagnosis: S83.511A (ICD-10-CM) - Rupture of anterior cruciate ligament of right knee, initial encounter  · Treatment Diagnosis: M25.561 Right knee pain  Insurance/Certification information:  PT Insurance Information: [employer]  Physician Information:  Referring Practitioner: Gabino Davis MD  Has the plan of care been signed (Y/N):        [x]  Yes  []  No     Date of Patient follow up with Physician:       Is this a Progress Report:     [x]  Yes  []  No        If Yes:  Date Range for reporting period:  Beginning 21  Ending 10/29/21    Progress report will be due (10 Rx or 30 days whichever is less): 15/28/55      Recertification will be due (POC Duration  / 90 days whichever is less): 21         Visit # Insurance Allowable Auth Required   2  2 of 6  24 visits total [employer]   6 visits   -12/3  8 visits  -  12 visits eval included end date 21 [x]  Yes []  No        Functional Scale:   LEFS 36% deficit Date assessed: 21   LEFS 36% deficit Date assessed: 10/29/21  LEFS 41% deficit Date assessed: 21  LEFS 46% deficit Date assessed: 21  LEFS 45% deficit Date assessed: 21  LEFS 59% deficit Date assessed: 21  LEFS 65% deficit Date assessed: 21  LEFS 89% deficit Date assessed:  21       Latex Allergy:  [x]NO      []YES  Preferred Language for Healthcare:   [x]English       []other:    Pain level:  0/10     SUBJECTIVE:   Pt has not been to PT d/t testing positive for COVID-19. Overall her R LE is doing well but no real change d/t not being able to lift.  Saw MD today and pleased with progress overall. OBJECTIVE:   Observation: 11/30/21   DL squat mild weight shift to L noted at end range    SL squat 50% decrease in depth on R compared to L with valgus and excessive forward knee flexion noted   Test measurements: 11/30/21  ROM PROM AROM Overpressure Comment     L R   L R  L R     Flexion   152  153 150        Extension   +2 +5 +2          Strength L R Comment   Quad 5 5     Hamstring 5 5     Hip  flexion  5 5     Hip abd  5 5     Hip add 5 5    Hip ext 5 5          Girth L R   Mid Patella 34.5 cm 34.5 cm     Gait: Independent and WNL    Incisions: Incision closed with no s/s of infection   Palpation: Tenderness Distal ITB on L, patella tendon L, medial/ lateral joint line         RESTRICTIONS/PRECAUTIONS: S/P R ACL with BPTB graft, medial and lateral meniscus tear 5/12/21    Exercises/Interventions:   Exercise/Equipment Resistance/Repetitions Other comments   Stretching/AROM       Ankle pumps 5/19 cont with HEP    Bike x5' AROM  7/2 able to complete full revolutions    Calf stretch 30\"hx3 R ^6/15 slant board with protected weight bearing, bilateral axillary crutches.     Hamstring  30\"hx3 R 5/25 Seated EOT, 1/2 roll under ankle    Prone quad strertch 30\"hx3 R7/30   ITB stretch  10/6   Heel slides 7/2 reintroduced d/t decrease in squeak   SB roll ins 6/29 withheld d/t audible squeak at patellofemoral joint   Knee flexion PROM  6/29 completed with assist from L LE at EOT   Hip flexor stretch  7/2   Heel raises  6/15 at table with bilateral UE support   Strengthening      SLR  10/14 emphasis on eccentric control with lower   SLR abd 10/14 cont with HEP   SLR add 10/14 cont with HEP   SLR ext 10/14 cont with HEP   SLR + 30\"hx5 R 11/11 5#   ^9/29 full ROM   Stool scoots   9/15   Side stepping  9/29   Band walks 9/29   Hamstring curl ^8/2 standing   SL bridge 7/30   Clams  ^7/30   Neuromuscular re-ed      SL Y reach 11/12   Lateral lunge with slider 3x10 R 11/30 cues to avoid excessive trunk flexion    SL running balance 3x10 R 10/20 R UE support and L toe support with eccentric lower on R    LTD 3x10 R  ^11/12 4\" step, cues for proper activation    TRX  ^10/6 Gray TB around knees   SL closed chain [id number] R 5# bilat UE  1x10 no weight ^11/30 completed last set without weight d/t lumbar compensation   9/29 completed on 4\" step    SLS with hip abd 3x10 blue versa loop ^10/20 completed on airex   Marching  7/13   Step up  11/12 completed on Rubbie Coffin Escobar's  2# med ball  ^11/30 airex    SLB on Biodex 30\"hx5 R 11/30 L4   Wall sits  9/15 withheld d/t fatigue    Quantum machines       Leg press  100# 5x5 SL R    120# 3x10 eccentric lower on R ^11/30 changed sets d/t fatigue  ^10/29   Leg extension 15# 5x5 SL R 11/12 90<>45   Leg curl 30# 5x5 SL R 11/30 adjusted weight based on form and fatigue    ^8/2   10/6 Completed with R LE only   Manual interventions        STM  10/14 withheld d/t     Martin  R 7/16                   Therapeutic Exercise and NMR EXR  [x] (21333) Provided verbal/tactile cueing for activities related to strengthening, flexibility, endurance, ROM for improvements in LE, proximal hip, and core control with self care, mobility, lifting, ambulation.  [] (72268) Provided verbal/tactile cueing for activities related to improving balance, coordination, kinesthetic sense, posture, motor skill, proprioception  to assist with LE, proximal hip, and core control in self care, mobility, lifting, ambulation and eccentric single leg control.      NMR and Therapeutic Activities:    [] (75664 or 63718) Provided verbal/tactile cueing for activities related to improving balance, coordination, kinesthetic sense, posture, motor skill, proprioception and motor activation to allow for proper function of core, proximal hip and LE with self care and ADLs  [] (93488) Gait Re-education- Provided training and instruction to the patient for proper LE, core and proximal hip recruitment and positioning and eccentric body weight control with ambulation re-education including up and down stairs     Home Exercise Program:    [x] (90546) Reviewed/Progressed HEP activities related to strengthening, flexibility, endurance, ROM of core, proximal hip and LE for functional self-care, mobility, lifting and ambulation/stair navigation   [] (53467)Reviewed/Progressed HEP activities related to improving balance, coordination, kinesthetic sense, posture, motor skill, proprioception of core, proximal hip and LE for self care, mobility, lifting, and ambulation/stair navigation      Manual Treatments:  PROM / STM / Oscillations-Mobs:  G-I, II, III, IV (PA's, Inf., Post.)  [] (95057) Provided manual therapy to mobilize LE, proximal hip and/or LS spine soft tissue/joints for the purpose of modulating pain, promoting relaxation,  increasing ROM, reducing/eliminating soft tissue swelling/inflammation/restriction, improving soft tissue extensibility and allowing for proper ROM for normal function with self care, mobility, lifting and ambulation. Modalities:      Charges:  Timed Code Treatment Minutes: 62'   Total Treatment Minutes: 4:52-6:04  67'       [] EVAL (LOW) 19611 (typically 20 minutes face-to-face)  [] EVAL (MOD) 88390 (typically 30 minutes face-to-face)  [] EVAL (HIGH) 63848 (typically 45 minutes face-to-face)  [] RE-EVAL   [x] AZ(98955) x 1    [] IONTO  [x] NMR (94256) x 1    [] VASO  [] Manual (50544) x      [] Other: Performance test  [x] TA x 2     [] Mech Traction (69836)  [] ES(attended) (41716)      [] ES (un) (94298):     GOALS:   Patient stated goal: Return to [employer]    [x]? Progressing: []? Met: []? Not Met: []? Adjusted     Therapist goals for Patient:   Short Term Goals: To be achieved in: 8 weeks  1. Independent in HEP and progression per patient tolerance, in order to prevent re-injury. []? Progressing: [x]? Met: []? Not Met: []? Adjusted   2.  Patient will have a decrease in pain to facilitate improvement in movement, function, and ADLs as indicated by Functional Deficits. []? Progressing: [x]? Met: []? Not Met: []? Adjusted     Long Term Goals: To be achieved in: 16 weeks  1. Disability index score of 20% or less for the LEFS to assist with reaching prior level of function. [x]? Progressing: []? Met: []? Not Met: []? Adjusted  2. Patient will demonstrate increased R knee AROM that is equal to L to allow for proper joint functioning as indicated by patients Functional Deficits. []? Progressing: [x]? Met: []? Not Met: []? Adjusted  3. Patient will demonstrate an increase in R knee strength that is equal to L good proximal hip strength and control  in LE to allow for proper functional mobility as indicated by patients Functional Deficits. []? Progressing: [x]? Met: []? Not Met: []? Adjusted  4. Patient will return to all functional activities without increased symptoms or restriction. [x]? Progressing: []? Met: []? Not Met: []? Adjusted  5. Patient will complete Biodex Isokinetic strength with 25% or less deficit of quadriceps and hamsrting strength compared to L as well as PKTQ/BW of 40% or greater of quadriceps and 30% of hamstring on L in order to begin return to running progression  []? Progressing: []? Met: [x]? Not Met: []? Adjusted         Overall Progression Towards Functional goals/ Treatment Progress Update:  [] Patient is progressing as expected towards functional goals listed with increase strength and ROM that is appropriate based on MD orders. [x] Progression overall has been slow d/t complexities associated with surgery and recently has missed two weeks d/t testing positive for COVID-19.  Based on surgical intervention, objective deficits and MD protocol she is appropriate for continued PT to further increase strength and stability allowing for protection of surgical intervention as well as increase in functional independence 11/30/21  [] Progression has been slowed due to co-morbidities. [] Plan just implemented, too soon to assess goals progression <30days   [] Goals require adjustment due to lack of progress  [] Patient is not progressing as expected and requires additional follow up with physician  [] Other    Prognosis for POC: [x] Good [] Fair  [] Poor      Patient requires continued skilled intervention: [x] Yes  [] No    Treatment/Activity Tolerance:  [x] Patient able to complete treatment  [] Patient limited by fatigue  [] Patient limited by some pain     [] Patient limited by other medical complications  [x] Other: 11/30: Pt fatigued with treatment d/t being sick for the past two weeks but tolerated treatment with no increase in symptoms and no knee instability noted. Time spent on avoiding compensation with squatting activities especially in the form of excessive forward trunk. Patient Education:           10/14 Educated on eccentric strengthening of quad. 9/29 Updated HEP based on tolerance to treatment  8/25 Updated HEP based on treatment  8/18 Updated HEP based on tolerance to treatment  8/2 Updated HEP   7/23 Reviewed proper gait sequencing and activity modification with walking around neighborhood to decrease risk of increase in symptoms. Updated HEP based on tolerance to treatment. 7/16 educated on decreasing ambulation with no AD and to revert to 1 crutch around the house if symptoms in R knee increase or increased antalgic gait with no AD.  7/13 Educated on proper use of unilateral crutch for community ambulation but to attempt ambulating around house without AD as tolerated. 7/6 Educated on activity modification in order to decrease swelling and improve quad activation. Pt to ambulate with unilateral crutch as tolerated. 6/29 Increased weight with HEP. Educated on self patellar mobilizations for home based on MD orders  6/23 Updated HEP, reviewed continued use of ice especially with increase time spent in standing.  Stressed importance of decreasing swelling to improve knee ROM and quad activation. 6/11 Educated on 25% weight bearing and proper sequencing during gait. 5/27 Updated HEP and reviewed precuations. 5/25 Reviewed appropriate knee flexion ROM and proper fit of TROM. 5/19 Educated on precautions with knee flexion ROM and to not push through pain but work on gentle ROM. Educated on continued use of ice to decrease swelling and for pain control as she progresses off of pain medication   5/12 Educated on importance of ice/elevation to decrease swelling as well as proper stair negotiation with crutches and non weight bearing on R. Also reviewed importance of consistency with HEP. HEP instruction:   Access Code: Devon Conway           PLAN: See eval  [x] Continue per plan of care [] Alter current plan (see comments above)  [x] Plan of care initiated [] Hold pending MD visit [] Discharge  11/30 Continue with PT 1x a week for 4 more weeks to further increase LE strength and stability before re-attempting isokinetic testing. 10/29 Pt is appropriate for continued PT 1x a week for 6 more weeks to progress strengthening program in order to protect surgical repair and progress overall function. She has exhausted insurance visits based on dates provided and will complete HEP independently while awaiting approval for more visits.              Electronically signed by:  Martin Lorenzo PT    Note: If patient does not return for scheduled/ recommended follow up visits, this note will serve as a discharge from care along with most recent update on progress

## 2021-12-08 ENCOUNTER — HOSPITAL ENCOUNTER (OUTPATIENT)
Dept: PHYSICAL THERAPY | Age: 31
Setting detail: THERAPIES SERIES
Discharge: HOME | End: 2021-12-08
Payer: COMMERCIAL

## 2021-12-08 PROCEDURE — 97110 THERAPEUTIC EXERCISES: CPT

## 2021-12-08 PROCEDURE — 97530 THERAPEUTIC ACTIVITIES: CPT

## 2021-12-08 PROCEDURE — 97112 NEUROMUSCULAR REEDUCATION: CPT

## 2021-12-08 NOTE — FLOWSHEET NOTE
since last visit and having discomfort with squatting. Pt states she was working Monday night into Tuesday and noticed medial knee discomfort with walking. Having some lateral knee discomfort today. States she has not been compliant with stretching and has been working more which may be contributing to symptoms. OBJECTIVE:   Observation: 11/30/21   DL squat mild weight shift to L noted at end range    SL squat 50% decrease in depth on R compared to L with valgus and excessive forward knee flexion noted   Test measurements: 11/30/21  ROM PROM AROM Overpressure Comment     L R   L R  L R     Flexion   152  153 150        Extension   +2 +5 +2          Strength L R Comment   Quad 5 5     Hamstring 5 5     Hip  flexion  5 5     Hip abd  5 5     Hip add 5 5    Hip ext 5 5          Girth L R   Mid Patella 34.5 cm 34.5 cm     Gait: Independent and WNL    Incisions: Incision closed with no s/s of infection   Palpation: Tenderness Distal ITB on L, patella tendon L, medial/ lateral joint line       ISOKINETIC TESTING completed on 10/29/21  Bilateral Difference:  Quadricep 180 deg/sec: 63% [x]? Deficit   []? Surplus 300 deg/sec: 48% [x]? Deficit   []? Surplus   Hamstring 180 deg/sec: 24.7% [x]? Deficit   []? Surplus 300 deg/sec: 21.5% [x]? Deficit   []? Surplus      Normative Data, 180 degrees/second:  Quadricep Normal: 55-60% peak TQ/BW Patient: 20.5   Hamstring Normal: 45-55% peak TQ/BW Patient: 22.5      Normative Data, 300 degrees/second:  Quadricep Normal: 45-55% peak TQ/BW Patient: 21   Hamstring Normal: 40-45% peak TQ/BW Patient: 18.2           RESTRICTIONS/PRECAUTIONS: S/P R ACL with BPTB graft, medial and lateral meniscus tear 5/12/21    Exercises/Interventions:   Exercise/Equipment Resistance/Repetitions Other comments   Stretching/AROM       Bike x5' AROM  7/2 able to complete full revolutions    Calf stretch 30\"hx3 R ^6/15 slant board with protected weight bearing, bilateral axillary crutches.     Hamstring 30\"hx3 R 5/25 Seated EOT, 1/2 roll under ankle    Prone quad strertch 30\"hx3 R7/30   ITB stretch 30\"hx3 R12/8 reintroduced d/t symptoms and pt not being compliant with stretch for HEP   Strengthening      SLR abd with hip flex/ext 5# 3x10 R 12/8 side lying    SLR + 30\"hx5 R 11/11 5#   SL bridge 3x10 R 12/8 with BS to maintain level hips   Clams Gray loop 10\"hx10 R ^12/8   Neuromuscular re-ed      SL squat at table 3x10 R 12/8 bilat UE support, decrease depth to 45 deg knee flexion   SL closed chain hamstringGreen TB 3x10 R ^12/8   SLS with hip abd 3x10  12/8 withheld band to focus on proper form, completed on airex   Quantum machines       Leg press  80# 5x5 SL R    120# 3x10 eccentric lower on R 12/8 decreased weight to focus on form  12/8 blue loop around knees to avoid valgus   Leg extension 15# 3x10 SL R ^12/8 did not complete on machine d/t pain but able to complete with ankle weights and no issues. Leg curl 30# 5x5 SL R 11/30 adjusted weight based on form and fatigue    ^8/2   10/6 Completed with R LE only   Manual interventions        STM  10/14 withheld d/t     Martin  R 7/16                   Therapeutic Exercise and NMR EXR  [x] (96535) Provided verbal/tactile cueing for activities related to strengthening, flexibility, endurance, ROM for improvements in LE, proximal hip, and core control with self care, mobility, lifting, ambulation.  [] (94943) Provided verbal/tactile cueing for activities related to improving balance, coordination, kinesthetic sense, posture, motor skill, proprioception  to assist with LE, proximal hip, and core control in self care, mobility, lifting, ambulation and eccentric single leg control.      NMR and Therapeutic Activities:    [] (76485 or 15776) Provided verbal/tactile cueing for activities related to improving balance, coordination, kinesthetic sense, posture, motor skill, proprioception and motor activation to allow for proper function of core, proximal hip and LE with self care and ADLs  [] (09549) Gait Re-education- Provided training and instruction to the patient for proper LE, core and proximal hip recruitment and positioning and eccentric body weight control with ambulation re-education including up and down stairs     Home Exercise Program:    [x] (65522) Reviewed/Progressed HEP activities related to strengthening, flexibility, endurance, ROM of core, proximal hip and LE for functional self-care, mobility, lifting and ambulation/stair navigation   [] (29077)Reviewed/Progressed HEP activities related to improving balance, coordination, kinesthetic sense, posture, motor skill, proprioception of core, proximal hip and LE for self care, mobility, lifting, and ambulation/stair navigation      Manual Treatments:  PROM / STM / Oscillations-Mobs:  G-I, II, III, IV (PA's, Inf., Post.)  [] (16413) Provided manual therapy to mobilize LE, proximal hip and/or LS spine soft tissue/joints for the purpose of modulating pain, promoting relaxation,  increasing ROM, reducing/eliminating soft tissue swelling/inflammation/restriction, improving soft tissue extensibility and allowing for proper ROM for normal function with self care, mobility, lifting and ambulation. Modalities:  CP R knee x10' 12/8/21    Charges:  Timed Code Treatment Minutes: 54'   Total Treatment Minutes: 10:04-11:21  77'       [] EVAL (LOW) 25265 (typically 20 minutes face-to-face)  [] EVAL (MOD) 64604 (typically 30 minutes face-to-face)  [] EVAL (HIGH) 65016 (typically 45 minutes face-to-face)  [] RE-EVAL   [x] YF(59338) x 2    [] IONTO  [x] NMR (72509) x 1    [] VASO  [] Manual (06463) x      [] Other: Performance test  [x] TA x 1    [] Mech Traction (92049)  [] ES(attended) (25145)      [] ES (un) (82088):     GOALS:   Patient stated goal: Return to [employer]    [x]? Progressing: []? Met: []? Not Met: []? Adjusted     Therapist goals for Patient:   Short Term Goals: To be achieved in: 8 weeks  1.  Independent in HEP and progression per patient tolerance, in order to prevent re-injury. []? Progressing: [x]? Met: []? Not Met: []? Adjusted   2. Patient will have a decrease in pain to facilitate improvement in movement, function, and ADLs as indicated by Functional Deficits. []? Progressing: [x]? Met: []? Not Met: []? Adjusted     Long Term Goals: To be achieved in: 16 weeks  1. Disability index score of 20% or less for the LEFS to assist with reaching prior level of function. [x]? Progressing: []? Met: []? Not Met: []? Adjusted  2. Patient will demonstrate increased R knee AROM that is equal to L to allow for proper joint functioning as indicated by patients Functional Deficits. []? Progressing: [x]? Met: []? Not Met: []? Adjusted  3. Patient will demonstrate an increase in R knee strength that is equal to L good proximal hip strength and control  in LE to allow for proper functional mobility as indicated by patients Functional Deficits. []? Progressing: [x]? Met: []? Not Met: []? Adjusted  4. Patient will return to all functional activities without increased symptoms or restriction. [x]? Progressing: []? Met: []? Not Met: []? Adjusted  5. Patient will complete [employer] Isokinetic strength with 25% or less deficit of quadriceps and hamsrting strength compared to L as well as PKTQ/BW of 40% or greater of quadriceps and 30% of hamstring on L in order to begin return to running progression  []? Progressing: []? Met: [x]? Not Met: []? Adjusted         Overall Progression Towards Functional goals/ Treatment Progress Update:  [] Patient is progressing as expected towards functional goals listed with increase strength and ROM that is appropriate based on MD orders. [x] Progression overall has been slow d/t complexities associated with surgery and recently has missed two weeks d/t testing positive for COVID-19.  Based on surgical intervention, objective deficits and MD protocol she is appropriate for continued PT to further increase strength and stability allowing for protection of surgical intervention as well as increase in functional independence 11/30/21  [] Progression has been slowed due to co-morbidities. [] Plan just implemented, too soon to assess goals progression <30days   [] Goals require adjustment due to lack of progress  [] Patient is not progressing as expected and requires additional follow up with physician  [] Other    Prognosis for POC: [x] Good [] Fair  [] Poor      Patient requires continued skilled intervention: [x] Yes  [] No    Treatment/Activity Tolerance:  [x] Patient able to complete treatment  [] Patient limited by fatigue  [] Patient limited by some pain     [] Patient limited by other medical complications  [x] Other: 12/8 Pt presented with tightness/ tenderness at distal ITB d/t walking more with work and not stretching. Continues to have strength deficits causing knee valgus during leg press and SL squat. Responded well to use of TB to prevent knee valgus during leg press. Pt as demonstrated improved form and tolerance with SL squat completed at table with bilateral UE support. Fatigued by end of treatment but no adverse effects noted or reported. Patient Education:           12/8 Updated HEP based on tolerance to treatment   10/14 Educated on eccentric strengthening of quad. 9/29 Updated HEP based on tolerance to treatment  8/25 Updated HEP based on treatment  8/18 Updated HEP based on tolerance to treatment  8/2 Updated HEP   7/23 Reviewed proper gait sequencing and activity modification with walking around neighborhood to decrease risk of increase in symptoms. Updated HEP based on tolerance to treatment.    7/16 educated on decreasing ambulation with no AD and to revert to 1 crutch around the house if symptoms in R knee increase or increased antalgic gait with no AD.  7/13 Educated on proper use of unilateral crutch for community ambulation but to attempt ambulating around house without AD as tolerated. 7/6 Educated on activity modification in order to decrease swelling and improve quad activation. Pt to ambulate with unilateral crutch as tolerated. 6/29 Increased weight with HEP. Educated on self patellar mobilizations for home based on MD orders  6/23 Updated HEP, reviewed continued use of ice especially with increase time spent in standing. Stressed importance of decreasing swelling to improve knee ROM and quad activation. 6/11 Educated on 25% weight bearing and proper sequencing during gait. 5/27 Updated HEP and reviewed precuations. 5/25 Reviewed appropriate knee flexion ROM and proper fit of TROM. 5/19 Educated on precautions with knee flexion ROM and to not push through pain but work on gentle ROM. Educated on continued use of ice to decrease swelling and for pain control as she progresses off of pain medication   5/12 Educated on importance of ice/elevation to decrease swelling as well as proper stair negotiation with crutches and non weight bearing on R. Also reviewed importance of consistency with HEP. HEP instruction:   Access Code: Greg Richard           PLAN: See eval  [x] Continue per plan of care [] Alter current plan (see comments above)  [x] Plan of care initiated [] Hold pending MD visit [] Discharge  12/8 Monitor symptoms as distal ITB continue to push eccentric quad strengthening as tolerated based on symptoms. 11/30 Continue with PT 1x a week for 4 more weeks to further increase LE strength and stability before re-attempting isokinetic testing.              Electronically signed by:  Joshua Angeles PT    Note: If patient does not return for scheduled/ recommended follow up visits, this note will serve as a discharge from care along with most recent update on progress

## 2021-12-13 ENCOUNTER — HOSPITAL ENCOUNTER (OUTPATIENT)
Dept: PHYSICAL THERAPY | Age: 31
Setting detail: THERAPIES SERIES
Discharge: HOME | End: 2021-12-13
Payer: COMMERCIAL

## 2021-12-13 PROCEDURE — 97112 NEUROMUSCULAR REEDUCATION: CPT | Performed by: PHYSICAL THERAPIST

## 2021-12-13 PROCEDURE — 97530 THERAPEUTIC ACTIVITIES: CPT | Performed by: PHYSICAL THERAPIST

## 2021-12-13 PROCEDURE — 97110 THERAPEUTIC EXERCISES: CPT | Performed by: PHYSICAL THERAPIST

## 2021-12-13 NOTE — FLOWSHEET NOTE
Apurva [address] 83, [address] [place] [address]  Phone: [phone]   Fax:     [phone]       Physical Therapy Progress/Daily Treatment Note  Date:  2021    Patient Name:  Gianna Walsh    :  1990  MRN: [id number]  Restrictions/Precautions:    Medical/Treatment Diagnosis Information:  · Diagnosis: S83.511A (ICD-10-CM) - Rupture of anterior cruciate ligament of right knee, initial encounter  · Treatment Diagnosis: M25.561 Right knee pain  Insurance/Certification information:  PT Insurance Information: [employer]  Physician Information:  Referring Practitioner: Juan Francisco Del Cid MD  Has the plan of care been signed (Y/N):        [x]  Yes  []  No     Date of Patient follow up with Physician:       Is this a Progress Report:     []  Yes  [x]  No        If Yes:  Date Range for reporting period:  Beginning 21  Ending 10/29/21    Progress report will be due (10 Rx or 30 days whichever is less):       Recertification will be due (POC Duration  / 90 days whichever is less): 21         Visit # Insurance Allowable Auth Required   4  4 of 6  25 visits total [employer]   6 visits   -  8 visits  -  12 visits eval included end date 21 [x]  Yes []  No        Functional Scale:   LEFS 36% deficit Date assessed: 21   LEFS 36% deficit Date assessed: 10/29/21  LEFS 41% deficit Date assessed: 21  LEFS 46% deficit Date assessed: 21  LEFS 45% deficit Date assessed: 21  LEFS 59% deficit Date assessed: 21  LEFS 65% deficit Date assessed: 21  LEFS 89% deficit Date assessed:  21       Latex Allergy:  [x]NO      []YES  Preferred Language for Healthcare:   [x]English       []other:    Pain level:  0/10     SUBJECTIVE:   feeling better. Think I have turned a quarter. Was able to wear heels for a short period over the weekend.       OBJECTIVE:   Observation: 11/30/21   DL squat mild weight shift to L noted at end range    SL squat 50% decrease in depth on R compared to L with valgus and excessive forward knee flexion noted   Test measurements: 11/30/21  ROM PROM AROM Overpressure Comment     L R   L R  L R     Flexion   152  153 150        Extension   +2 +5 +2          Strength L R Comment   Quad 5 5     Hamstring 5 5     Hip  flexion  5 5     Hip abd  5 5     Hip add 5 5    Hip ext 5 5          Girth L R   Mid Patella 34.5 cm 34.5 cm     Gait: Independent and WNL    Incisions: Incision closed with no s/s of infection   Palpation: Tenderness Distal ITB on L, patella tendon L, medial/ lateral joint line       ISOKINETIC TESTING completed on 10/29/21  Bilateral Difference:  Quadricep 180 deg/sec: 63% [x]? Deficit   []? Surplus 300 deg/sec: 48% [x]? Deficit   []? Surplus   Hamstring 180 deg/sec: 24.7% [x]? Deficit   []? Surplus 300 deg/sec: 21.5% [x]? Deficit   []? Surplus      Normative Data, 180 degrees/second:  Quadricep Normal: 55-60% peak TQ/BW Patient: 20.5   Hamstring Normal: 45-55% peak TQ/BW Patient: 22.5      Normative Data, 300 degrees/second:  Quadricep Normal: 45-55% peak TQ/BW Patient: 21   Hamstring Normal: 40-45% peak TQ/BW Patient: 18.2           RESTRICTIONS/PRECAUTIONS: S/P R ACL with BPTB graft, medial and lateral meniscus tear 5/12/21    Exercises/Interventions:   Exercise/Equipment Resistance/Repetitions Other comments   Stretching/AROM       Bike x5' AROM  7/2 able to complete full revolutions    Calf stretch 30\"hx3 R ^6/15 slant board with protected weight bearing, bilateral axillary crutches.     Hamstring  30\"hx3 R 5/25 Seated EOT, 1/2 roll under ankle    Prone quad strertch 30\"hx3 R7/30   ITB stretch 30\"hx3 R12/8 reintroduced d/t symptoms and pt not being compliant with stretch for HEP   Strengthening      SLR abd with hip flex/ext 5# 3x10 R 12/8 side lying    SLR + 30\"hx5 R 11/11 5#   SL bridge 3x10 R 12/8 with BS to maintain level hips   Clams [place] loop 10\"hx10 R ^12/8   Neuromuscular re-ed      SL squat at table 3x10 R 12/8 bilat UE support, decrease depth to 45 deg knee flexion   SL closed chain hamstringGreen TB 3x10 R ^12/8   SLS with hip abd 3x10  12/8 withheld band to focus on proper form, completed on airex   Quantum machines       Leg press  100# 5x5 SL R 10 holes show at bottom    120# 3x10 eccentric lower on R   ^ 12/13   Leg extension 10/15# 3x10 SL R (D,4, 1/2 hole)     Leg curl 30# 5x5 SL R  See 1/2 hole on seat 11/30 adjusted weight based on form and fatigue    ^8/2   10/6 Completed with R LE only   Manual interventions        STM  10/14 withheld d/t     Martin  R 7/16                   Therapeutic Exercise and NMR EXR  [x] (38172) Provided verbal/tactile cueing for activities related to strengthening, flexibility, endurance, ROM for improvements in LE, proximal hip, and core control with self care, mobility, lifting, ambulation.  [] (19794) Provided verbal/tactile cueing for activities related to improving balance, coordination, kinesthetic sense, posture, motor skill, proprioception  to assist with LE, proximal hip, and core control in self care, mobility, lifting, ambulation and eccentric single leg control.      NMR and Therapeutic Activities:    [] (79645 or 62929) Provided verbal/tactile cueing for activities related to improving balance, coordination, kinesthetic sense, posture, motor skill, proprioception and motor activation to allow for proper function of core, proximal hip and LE with self care and ADLs  [] (32521) Gait Re-education- Provided training and instruction to the patient for proper LE, core and proximal hip recruitment and positioning and eccentric body weight control with ambulation re-education including up and down stairs     Home Exercise Program:    [x] (74075) Reviewed/Progressed HEP activities related to strengthening, flexibility, endurance, ROM of core, proximal hip and LE for functional self-care, mobility, lifting and ambulation/stair navigation   [] (39352)Reviewed/Progressed HEP activities related to improving balance, coordination, kinesthetic sense, posture, motor skill, proprioception of core, proximal hip and LE for self care, mobility, lifting, and ambulation/stair navigation      Manual Treatments:  PROM / STM / Oscillations-Mobs:  G-I, II, III, IV (PA's, Inf., Post.)  [] (98112) Provided manual therapy to mobilize LE, proximal hip and/or LS spine soft tissue/joints for the purpose of modulating pain, promoting relaxation,  increasing ROM, reducing/eliminating soft tissue swelling/inflammation/restriction, improving soft tissue extensibility and allowing for proper ROM for normal function with self care, mobility, lifting and ambulation. Modalities:  CP R knee x10' 12/13/21    Charges:  Timed Code Treatment Minutes: 60   Total Treatment Minutes: 205--338       [] EVAL (LOW) 96866 (typically 20 minutes face-to-face)  [] EVAL (MOD) 31253 (typically 30 minutes face-to-face)  [] EVAL (HIGH) 55463 (typically 45 minutes face-to-face)  [] RE-EVAL   [x] NB(99096) x 2    [] IONTO  [x] NMR (77446) x 1    [] VASO  [] Manual (76450) x      [] Other: Performance test  [x] TA x 1    [] Mech Traction (33884)  [] ES(attended) (68338)      [] ES (un) (38042):     GOALS:   Patient stated goal: Return to [employer]    [x]? Progressing: []? Met: []? Not Met: []? Adjusted     Therapist goals for Patient:   Short Term Goals: To be achieved in: 8 weeks  1. Independent in HEP and progression per patient tolerance, in order to prevent re-injury. []? Progressing: [x]? Met: []? Not Met: []? Adjusted   2. Patient will have a decrease in pain to facilitate improvement in movement, function, and ADLs as indicated by Functional Deficits. []? Progressing: [x]? Met: []? Not Met: []? Adjusted     Long Term Goals: To be achieved in: 16 weeks  1.  Disability index score of 20% or less for the LEFS to assist with reaching prior level of function. [x]? Progressing: []? Met: []? Not Met: []? Adjusted  2. Patient will demonstrate increased R knee AROM that is equal to L to allow for proper joint functioning as indicated by patients Functional Deficits. []? Progressing: [x]? Met: []? Not Met: []? Adjusted  3. Patient will demonstrate an increase in R knee strength that is equal to L good proximal hip strength and control  in LE to allow for proper functional mobility as indicated by patients Functional Deficits. []? Progressing: [x]? Met: []? Not Met: []? Adjusted  4. Patient will return to all functional activities without increased symptoms or restriction. [x]? Progressing: []? Met: []? Not Met: []? Adjusted  5. Patient will complete Biodex Isokinetic strength with 25% or less deficit of quadriceps and hamsrting strength compared to L as well as PKTQ/BW of 40% or greater of quadriceps and 30% of hamstring on L in order to begin return to running progression  []? Progressing: []? Met: [x]? Not Met: []? Adjusted         Overall Progression Towards Functional goals/ Treatment Progress Update:  [] Patient is progressing as expected towards functional goals listed with increase strength and ROM that is appropriate based on MD orders. [x] Progression overall has been slow d/t complexities associated with surgery and recently has missed two weeks d/t testing positive for COVID-19. Based on surgical intervention, objective deficits and MD protocol she is appropriate for continued PT to further increase strength and stability allowing for protection of surgical intervention as well as increase in functional independence 11/30/21  [] Progression has been slowed due to co-morbidities.   [] Plan just implemented, too soon to assess goals progression <30days   [] Goals require adjustment due to lack of progress  [] Patient is not progressing as expected and requires additional follow up with physician  [] Other    Prognosis for POC: [x] Good [] Fair  [] Poor      Patient requires continued skilled intervention: [x] Yes  [] No    Treatment/Activity Tolerance:  [x] Patient able to complete treatment  [] Patient limited by fatigue  [] Patient limited by some pain     [] Patient limited by other medical complications  [x] Other: 12/13 . Pt as demonstrated improved form and tolerance with all exercise today. Fatigued by end of treatment but no adverse effects noted or reported. Able to perform LAQ on quantum with some seat adjustments. Patient Education:           12/8 Updated HEP based on tolerance to treatment   10/14 Educated on eccentric strengthening of quad. 9/29 Updated HEP based on tolerance to treatment  8/25 Updated HEP based on treatment  8/18 Updated HEP based on tolerance to treatment  8/2 Updated HEP   7/23 Reviewed proper gait sequencing and activity modification with walking around neighborhood to decrease risk of increase in symptoms. Updated HEP based on tolerance to treatment. 7/16 educated on decreasing ambulation with no AD and to revert to 1 crutch around the house if symptoms in R knee increase or increased antalgic gait with no AD.  7/13 Educated on proper use of unilateral crutch for community ambulation but to attempt ambulating around house without AD as tolerated. 7/6 Educated on activity modification in order to decrease swelling and improve quad activation. Pt to ambulate with unilateral crutch as tolerated. 6/29 Increased weight with HEP. Educated on self patellar mobilizations for home based on MD orders  6/23 Updated HEP, reviewed continued use of ice especially with increase time spent in standing. Stressed importance of decreasing swelling to improve knee ROM and quad activation. 6/11 Educated on 25% weight bearing and proper sequencing during gait. 5/27 Updated HEP and reviewed precuations. 5/25 Reviewed appropriate knee flexion ROM and proper fit of TROM.    5/19 Educated on precautions with knee flexion ROM and to not push through pain but work on gentle ROM. Educated on continued use of ice to decrease swelling and for pain control as she progresses off of pain medication   5/12 Educated on importance of ice/elevation to decrease swelling as well as proper stair negotiation with crutches and non weight bearing on R. Also reviewed importance of consistency with HEP. HEP instruction:   Access Code: Candelario Roman           PLAN: See eval  [x] Continue per plan of care [] Alter current plan (see comments above)  [x] Plan of care initiated [] Hold pending MD visit [] Discharge  12/8 Monitor symptoms as distal ITB continue to push eccentric quad strengthening as tolerated based on symptoms. 11/30 Continue with PT 1x a week for 4 more weeks to further increase LE strength and stability before re-attempting isokinetic testing.              Electronically signed by:  Meaghan Barraza PT    Note: If patient does not return for scheduled/ recommended follow up visits, this note will serve as a discharge from care along with most recent update on progress

## 2021-12-15 ENCOUNTER — APPOINTMENT (OUTPATIENT)
Dept: PHYSICAL THERAPY | Age: 31
End: 2021-12-15
Payer: COMMERCIAL

## 2021-12-20 ENCOUNTER — HOSPITAL ENCOUNTER (OUTPATIENT)
Dept: PHYSICAL THERAPY | Age: 31
Setting detail: THERAPIES SERIES
Discharge: HOME | End: 2021-12-20
Payer: COMMERCIAL

## 2021-12-20 PROCEDURE — 97110 THERAPEUTIC EXERCISES: CPT | Performed by: PHYSICAL THERAPIST

## 2021-12-20 PROCEDURE — 97112 NEUROMUSCULAR REEDUCATION: CPT | Performed by: PHYSICAL THERAPIST

## 2021-12-20 PROCEDURE — 97530 THERAPEUTIC ACTIVITIES: CPT | Performed by: PHYSICAL THERAPIST

## 2021-12-20 NOTE — FLOWSHEET NOTE
shift to L noted at end range    SL squat 50% decrease in depth on R compared to L with valgus and excessive forward knee flexion noted   Test measurements: 11/30/21  ROM PROM AROM Overpressure Comment     L R   L R  L R     Flexion   152  153 150        Extension   +2 +5 +2          Strength L R Comment   Quad 5 5     Hamstring 5 5     Hip  flexion  5 5     Hip abd  5 5     Hip add 5 5    Hip ext 5 5          Girth L R   Mid Patella 34.5 cm 34.5 cm     Gait: Independent and WNL    Incisions: Incision closed with no s/s of infection   Palpation: Tenderness Distal ITB on L, patella tendon L, medial/ lateral joint line       ISOKINETIC TESTING completed on 10/29/21  Bilateral Difference:  Quadricep 180 deg/sec: 63% [x]? Deficit   []? Surplus 300 deg/sec: 48% [x]? Deficit   []? Surplus   Hamstring 180 deg/sec: 24.7% [x]? Deficit   []? Surplus 300 deg/sec: 21.5% [x]? Deficit   []? Surplus      Normative Data, 180 degrees/second:  Quadricep Normal: 55-60% peak TQ/BW Patient: 20.5   Hamstring Normal: 45-55% peak TQ/BW Patient: 22.5      Normative Data, 300 degrees/second:  Quadricep Normal: 45-55% peak TQ/BW Patient: 21   Hamstring Normal: 40-45% peak TQ/BW Patient: 18.2           RESTRICTIONS/PRECAUTIONS: S/P R ACL with BPTB graft, medial and lateral meniscus tear 5/12/21    Exercises/Interventions:   Exercise/Equipment Resistance/Repetitions Other comments   Stretching/AROM       Bike x5' AROM  7/2 able to complete full revolutions    Calf stretch 30\"hx3 R ^6/15 slant board with protected weight bearing, bilateral axillary crutches.     Hamstring  30\"hx3 R 5/25 Seated EOT, 1/2 roll under ankle    Prone quad strertch 30\"hx3 R7/30   ITB stretch 30\"hx3 R12/8 reintroduced d/t symptoms and pt not being compliant with stretch for HEP   Strengthening      SLR abd with hip flex/ext 5# 3x10 R 12/8 side lying    SLR + 30\"hx5 R 11/11 5#   SL bridge      3x threat 3x10 R      3x10 12/8 with BS to maintain level hips   Sheets [place] loop 10\"hx10 R ^12/8   Neuromuscular re-ed      SL squat at table 3x10 R 12/8 bilat UE support, decrease depth to 45 deg knee flexion   SL closed chain hamstringGreen TB 3x10 R ^12/8   SLS with hip abd 3x10  12/8 withheld band to focus on proper form, completed on airex   Quantum machines       Leg press  100# 5x5 SL R 10 holes show at bottom    120# 3x10 eccentric lower on R   ^ 12/13   Leg extension 10/15# 3x10 SL R (D,4, 1/2 hole)     Leg curl 30# 5x5 SL R  See 1/2 hole on seat 11/30 adjusted weight based on form and fatigue    ^8/2   10/6 Completed with R LE only   Manual interventions        STM  10/14 withheld d/t     Martin  R 7/16                   Therapeutic Exercise and NMR EXR  [x] (09882) Provided verbal/tactile cueing for activities related to strengthening, flexibility, endurance, ROM for improvements in LE, proximal hip, and core control with self care, mobility, lifting, ambulation.  [] (00848) Provided verbal/tactile cueing for activities related to improving balance, coordination, kinesthetic sense, posture, motor skill, proprioception  to assist with LE, proximal hip, and core control in self care, mobility, lifting, ambulation and eccentric single leg control.      NMR and Therapeutic Activities:    [] (21163 or 99050) Provided verbal/tactile cueing for activities related to improving balance, coordination, kinesthetic sense, posture, motor skill, proprioception and motor activation to allow for proper function of core, proximal hip and LE with self care and ADLs  [] (21018) Gait Re-education- Provided training and instruction to the patient for proper LE, core and proximal hip recruitment and positioning and eccentric body weight control with ambulation re-education including up and down stairs     Home Exercise Program:    [x] (02786) Reviewed/Progressed HEP activities related to strengthening, flexibility, endurance, ROM of core, proximal hip and LE for functional self-care, mobility, function. [x]? Progressing: []? Met: []? Not Met: []? Adjusted  2. Patient will demonstrate increased R knee AROM that is equal to L to allow for proper joint functioning as indicated by patients Functional Deficits. []? Progressing: [x]? Met: []? Not Met: []? Adjusted  3. Patient will demonstrate an increase in R knee strength that is equal to L good proximal hip strength and control  in LE to allow for proper functional mobility as indicated by patients Functional Deficits. []? Progressing: [x]? Met: []? Not Met: []? Adjusted  4. Patient will return to all functional activities without increased symptoms or restriction. [x]? Progressing: []? Met: []? Not Met: []? Adjusted  5. Patient will complete Biodex Isokinetic strength with 25% or less deficit of quadriceps and hamsrting strength compared to L as well as PKTQ/BW of 40% or greater of quadriceps and 30% of hamstring on L in order to begin return to running progression  []? Progressing: []? Met: [x]? Not Met: []? Adjusted         Overall Progression Towards Functional goals/ Treatment Progress Update:  [] Patient is progressing as expected towards functional goals listed with increase strength and ROM that is appropriate based on MD orders. [x] Progression overall has been slow d/t complexities associated with surgery and recently has missed two weeks d/t testing positive for COVID-19. Based on surgical intervention, objective deficits and MD protocol she is appropriate for continued PT to further increase strength and stability allowing for protection of surgical intervention as well as increase in functional independence 11/30/21  [] Progression has been slowed due to co-morbidities.   [] Plan just implemented, too soon to assess goals progression <30days   [] Goals require adjustment due to lack of progress  [] Patient is not progressing as expected and requires additional follow up with physician  [] Other    Prognosis for POC: [x] Good [] Fair  [] Poor      Patient requires continued skilled intervention: [x] Yes  [] No    Treatment/Activity Tolerance:  [x] Patient able to complete treatment  [] Patient limited by fatigue  [] Patient limited by some pain     [] Patient limited by other medical complications  [x] Other: 12/20 trial of fluk brace decrease patellar tendon pain with leg press, modified squats. Pt is approaching exhausting insurance. Reviewed GAP program for continued isolated straightening, PF protection  due to insurance limits. DC planning       Patient Education:           12/8 Updated HEP based on tolerance to treatment   10/14 Educated on eccentric strengthening of quad. 9/29 Updated HEP based on tolerance to treatment  8/25 Updated HEP based on treatment  8/18 Updated HEP based on tolerance to treatment  8/2 Updated HEP   7/23 Reviewed proper gait sequencing and activity modification with walking around neighborhood to decrease risk of increase in symptoms. Updated HEP based on tolerance to treatment. 7/16 educated on decreasing ambulation with no AD and to revert to 1 crutch around the house if symptoms in R knee increase or increased antalgic gait with no AD.  7/13 Educated on proper use of unilateral crutch for community ambulation but to attempt ambulating around house without AD as tolerated. 7/6 Educated on activity modification in order to decrease swelling and improve quad activation. Pt to ambulate with unilateral crutch as tolerated. 6/29 Increased weight with HEP. Educated on self patellar mobilizations for home based on MD orders  6/23 Updated HEP, reviewed continued use of ice especially with increase time spent in standing. Stressed importance of decreasing swelling to improve knee ROM and quad activation. 6/11 Educated on 25% weight bearing and proper sequencing during gait. 5/27 Updated HEP and reviewed precuations. 5/25 Reviewed appropriate knee flexion ROM and proper fit of TROM.    5/19 Educated on precautions with knee flexion ROM and to not push through pain but work on gentle ROM. Educated on continued use of ice to decrease swelling and for pain control as she progresses off of pain medication   5/12 Educated on importance of ice/elevation to decrease swelling as well as proper stair negotiation with crutches and non weight bearing on R. Also reviewed importance of consistency with HEP. HEP instruction:   Access Code: Rush Ceja           PLAN: See ev  [x] Continue per plan of care [] Alter current plan (see comments above)  [x] Plan of care initiated [] Hold pending MD visit [] Discharge  12/8 Monitor symptoms as distal ITB continue to push eccentric quad strengthening as tolerated based on symptoms. 11/30 Continue with PT 1x a week for 4 more weeks to further increase LE strength and stability before re-attempting isokinetic testing.              Electronically signed by:  Jodee Kurtz PT    Note: If patient does not return for scheduled/ recommended follow up visits, this note will serve as a discharge from care along with most recent update on progress

## 2022-01-06 ENCOUNTER — HOSPITAL ENCOUNTER (OUTPATIENT)
Dept: PHYSICAL THERAPY | Age: 32
Discharge: HOME | End: 2022-01-06

## 2022-01-06 PROCEDURE — 9990000029 HC GAP PACKAGE

## 2022-01-06 NOTE — FLOWSHEET NOTE
The [address] [place] [address], [address] [place] [address], [address] [place] [address]  Phone: [phone]   Fax:     [phone]      Lower Extremity Daily Performance Training Note  Date:  2022    Patient Name:  Kaylee Huynh    :  1990  MRN: [id number]  Restrictions/Precautions:   Medical/Treatment Diagnosis Information:   ·    ·   Diagnosis: B49.805T (ICD-10-CM) - Rupture of anterior cruciate ligament of right knee, initial encounter  · Treatment Diagnosis: M25.561 Right knee pain  ·   Insurance/Certification information:   [facility]    Physician Information:    Referring Practitioner: Baron Blake MD      Pain level: 010     Visit Number: 1 (1 )     Subjective: Knee sore after working back to back shifts, but otherwise feeling good. Objective:  Observation:     Exercises/Interventions:   Exercise/Equipment Resistance/Repetitions Other comments   Stretching/AROM       Bike x5' AROM  7/2 able to complete full revolutions    Calf stretch 30\"hx3 R ^6/15 slant board with protected weight bearing, bilateral axillary crutches.     Hamstring  30\"hx3 R 5/25 Seated EOT, 1/2 roll under ankle    Prone quad strertch 30\"hx3 R 7/30   ITB stretch 30\"hx3 R 12/8 reintroduced d/t symptoms and pt not being compliant with stretch for HEP   Strengthening       SLR abd with hip flex/ext 5# 3x10 R 12/8 side lying    SLR + 30\"hx5 R 11/11 5#   SL bridge        3x threat 3x10 R        3x10 12/8 with BS to maintain level hips   Clams Gray loop 10\"hx10 R ^12/8   Neuromuscular re-ed       SL squat at table 3x10 R 12/8 bilat UE support, decrease depth to 45 deg knee flexion   SL closed chain hamstring Green TB 3x10 R ^12/8   SLS with hip abd 3x10  12/8 withheld band to focus on proper form, completed on airex   Quantum machines       Leg press  120# 5x5 SL R 10 holes show at bottom     140# 3x10 eccentric lower on R   ^ 12/13   Leg extension 20# 3x10 SL R (D,4, 1/2 hole) 30# 3x10 ECC      Leg curl 35# 5x5 SL R  See 1/2 hole on seat 11/30 adjusted weight based on form and fatigue      ^8/2       10/6 Completed with R LE only   Manual interventions        STM  10/14 withheld d/t     Martin  R   7/16              Other Therapeutic Activities: Ice 15'    Home Exercise Program: Update as needed. Patient Education:      (1/6): Discussed [employer] program and pt stated goals of getting back into working out and doing classes and being able to be active. Assessment:  [x] Patient tolerated treatment well [] Patient limited by fatigue  [] Patient limited by pain  [] Patient limited by other medical complications  [] Other:     Prognosis: [x] Good [] Fair  [] Poor    Patient Requires Follow-up: [x] Yes  [] No    Plan:   [x] Continue per plan of care [] Alter current plan (see comments)  [] Plan of care initiated [] Hold pending MD visit [] Discharge  Plan for Next Session:  Progress as tolerated. MD Follow-up: 4+ weeks     Electronically signed by:  Tra Allan ATC     Tx was performed by TISHA as a part of the [facility] program following PT's recommendations/guidance.        Cosigned by:

## 2022-01-14 ENCOUNTER — HOSPITAL ENCOUNTER (OUTPATIENT)
Dept: PHYSICAL THERAPY | Age: 32
Discharge: HOME | End: 2022-01-14

## 2022-01-14 NOTE — FLOWSHEET NOTE
The [address] [place] [address],[address], [address], [address] [place] [address]  Phone: [phone]   Fax:     [phone]      Lower Extremity Daily Performance Training Note  Date:  2022    Patient Name:  Tushar Jerry    :  1990  MRN: [id number]  Restrictions/Precautions:   Medical/Treatment Diagnosis Information:      ·   Diagnosis: V91.421P (ICD-10-CM) - Rupture of anterior cruciate ligament of right knee, initial encounter  · Treatment Diagnosis: M25.561 Right knee pain  ·   Insurance/Certification information:   [facility]    Physician Information:    Referring Practitioner: Syed Cheatham MD      Pain level: 0/10     Visit Number: 2 (2 of 7)     Subjective: Knee sore after working back to back shifts, but otherwise feeling good. Objective:  Observation:     Exercises/Interventions:   Exercise/Equipment Resistance/Repetitions Other comments   Stretching/AROM       Bike x5' AROM  7/ able to complete full revolutions    Calf stretch 30\"hx3 R ^6/15 slant board with protected weight bearing, bilateral axillary crutches.     Hamstring  30\"hx3 R 5/25 Seated EOT, 1/2 roll under ankle    Prone quad strertch 30\"hx3 R 7/30   ITB stretch 30\"hx3 R 12/8 reintroduced d/t symptoms and pt not being compliant with stretch for HEP   Strengthening       SLR abd with hip flex/ext 5# 3x10 R 12/8 side lying    SLR + 30\"hx5 R 11/11 5#   SL bridge        3x threat 3x10 R        3x10 12/8 with BS to maintain level hips   Clams Gray loop 10\"hx10 R ^12/8   Neuromuscular re-ed       SL squat at table 3x10 R 12/8 bilat UE support, decrease depth to 45 deg knee flexion   SL closed chain hamstring Green TB 3x10 R ^12/8   SLS with hip abd 3x10  12/8 withheld band to focus on proper form, completed on airex   Quantum machines       Leg press  120# 5x5 SL R 10 holes show at bottom     140# 3x10 eccentric lower on R   ^ 12/13   Leg extension 20# 3x10 SL R (D,4, 1/2 hole)   30# 3x10 ECC      Leg curl 35# 5x5 SL R  See 1/2 hole on seat 11/30 adjusted weight based on form and fatigue      ^8/2       10/6 Completed with R LE only   Manual interventions        10/14 withheld d/t    R   7/16               Other Therapeutic Activities: Ice 15'    Home Exercise Program: Update as needed. Patient Education:      (1/6): Discussed [employer] program and pt stated goals of getting back into working out and doing classes and being able to be active. Assessment:  [x] Patient tolerated treatment well [] Patient limited by fatigue  [] Patient limited by pain  [] Patient limited by other medical complications  [] Other:     Prognosis: [x] Good [] Fair  [] Poor    Patient Requires Follow-up: [x] Yes  [] No    Plan:   [x] Continue per plan of care [] Alter current plan (see comments)  [] Plan of care initiated [] Hold pending MD visit [] Discharge  Plan for Next Session:  Progress as tolerated. MD Follow-up: 4+ weeks     Electronically signed by:  Nava Turpin ATC     Tx was performed by TISHA as a part of the [facility] program following PT's recommendations/guidance.        Cosigned by:

## 2022-01-19 ENCOUNTER — HOSPITAL ENCOUNTER (OUTPATIENT)
Dept: PHYSICAL THERAPY | Age: 32
Discharge: HOME | End: 2022-01-19

## 2022-01-19 NOTE — FLOWSHEET NOTE
The [address] [place] [address], [address] [place] [address], [address] [place] [address]  Phone: [phone]   Fax:     [phone]      Lower Extremity Daily Performance Training Note  Date:  2022    Patient Name:  Susan Campos    :  1990  MRN: [id number]  Restrictions/Precautions:   Medical/Treatment Diagnosis Information:      ·   Diagnosis: K87.669D (ICD-10-CM) - Rupture of anterior cruciate ligament of right knee, initial encounter  · Treatment Diagnosis: M25.561 Right knee pain  ·   Insurance/Certification information:   [facility]    Physician Information:    Referring Practitioner: Deidra Ortiz MD      Pain level: 0/10     Visit Number: 3 (3 of 7)     Subjective: Knee sore after working back to back shifts, but otherwise feeling good. Objective:  Observation:     Exercises/Interventions:   Exercise/Equipment Resistance/Repetitions Other comments   Stretching/AROM       Bike x5' AROM  7/2 able to complete full revolutions    Calf stretch 30\"hx3 R ^6/15 slant board with protected weight bearing, bilateral axillary crutches.     Hamstring  30\"hx3 R 5/25 Seated EOT, 1/2 roll under ankle    Prone quad strertch 30\"hx3 R 7/30   ITB stretch 30\"hx3 R 12/8 reintroduced d/t symptoms and pt not being compliant with stretch for HEP   Strengthening       SLR abd with hip flex/ext 5# 3x10 R 12/8 side lying    SLR + 30\"hx5 R 11/11 5#   SL bridge        3x threat 3x10 R        3x10 12/8 with BS to maintain level hips   Clams Gray loop 10\"hx10 R ^12/8   Neuromuscular re-ed       SL squat at table 3x10 R 12/8 bilat UE support, decrease depth to 45 deg knee flexion   SL closed chain hamstring Green TB 3x10 R ^12/8   SLS with hip abd 3x10  12/8 withheld band to focus on proper form, completed on airex   Quantum machines       Leg press  120# 5x5 SL R 10 holes show at bottom     140# 3x10 eccentric lower on R   ^ 12/13   Leg extension 25# 3x10 SL R (D,4, 1/2 hole)   35# 3x10 ECC      Leg curl 35# 5x5 SL R  See 1/2 hole on seat 11/30 adjusted weight based on form and fatigue      ^8/2       10/6 Completed with R LE only   Manual interventions        10/14 withheld d/t    R   7/16               Other Therapeutic Activities: Ice 15'    Home Exercise Program: Update as needed. Patient Education:      (1/6): Discussed [facility] program and pt stated goals of getting back into working out and doing classes and being able to be active. Assessment:  [x] Patient tolerated treatment well [] Patient limited by fatigue  [] Patient limited by pain  [] Patient limited by other medical complications  [] Other:     Prognosis: [x] Good [] Fair  [] Poor    Patient Requires Follow-up: [x] Yes  [] No    Plan:   [x] Continue per plan of care [] Alter current plan (see comments)  [] Plan of care initiated [] Hold pending MD visit [] Discharge  Plan for Next Session:  Progress as tolerated. MD Follow-up: 4+ weeks     Electronically signed by:  Yasmani Hale ATC     Tx was performed by TISHA as a part of the [facility] program following PT's recommendations/guidance.        Cosigned by:

## 2022-01-25 ENCOUNTER — HOSPITAL ENCOUNTER (OUTPATIENT)
Dept: PHYSICAL THERAPY | Age: 32
Discharge: HOME | End: 2022-01-25

## 2022-01-25 NOTE — FLOWSHEET NOTE
The [address] [place] [address],[address], 800 [facility] [address] [place] [address], [address] [place] [address]  Phone: [phone]   Fax:     [phone]      Lower Extremity Daily Performance Training Note  Date:  2022    Patient Name:  Makayla Saenz    :  1990  MRN: [id number]  Restrictions/Precautions:   Medical/Treatment Diagnosis Information:      ·   Diagnosis: F13.110X (ICD-10-CM) - Rupture of anterior cruciate ligament of right knee, initial encounter  · Treatment Diagnosis: M25.561 Right knee pain  ·   Insurance/Certification information:   [facility]    Physician Information:    Referring Practitioner: Allyn Luna MD      Pain level: 0/10     Visit Number: 4 (4 of 7)     Subjective: Knee sore after working back to back shifts, but otherwise feeling good. Objective:  Observation:     Exercises/Interventions:   Exercise/Equipment Resistance/Repetitions Other comments   Stretching/AROM       Bike x5' AROM  7/ able to complete full revolutions    Calf stretch 30\"hx3 R ^6/15 slant board with protected weight bearing, bilateral axillary crutches.     Hamstring  30\"hx3 R 5/25 Seated EOT, 1/2 roll under ankle    Prone quad strertch 30\"hx3 R 7/30   ITB stretch 30\"hx3 R 12/8 reintroduced d/t symptoms and pt not being compliant with stretch for HEP   Strengthening       SLR abd with hip flex/ext 5# 3x10 R 12/8 side lying    SLR + 30\"hx5 R 11/11 5#   SL bridge        3x threat 3x10 R        3x10 12/8 with BS to maintain level hips   Clams Gray loop 10\"hx10 R ^12/8   Neuromuscular re-ed       SL squat at table 3x10 R 12/8 bilat UE support, decrease depth to 45 deg knee flexion   SL closed chain hamstring Green TB 3x10 R ^12/8   SLS with hip abd 3x10  12/8 withheld band to focus on proper form, completed on airex   Quantum machines       Leg press  120# 5x5 SL R 10 holes show at bottom     140# 3x10 eccentric lower on R   ^ 12/13   Leg extension 25# 3x10 SL R (D,4, 2 hole)   35# 3x10 ECC      Leg curl 35# 5x5 SL R  See 1/2 hole on seat 11/30 adjusted weight based on form and fatigue      ^8/2       10/6 Completed with R LE only   Manual interventions        10/14 withheld d/t    R   7/16               Other Therapeutic Activities: Ice 15'    Home Exercise Program: Update as needed. Patient Education:      (1/6): Discussed [facility] program and pt stated goals of getting back into working out and doing classes and being able to be active. Assessment:  [x] Patient tolerated treatment well [] Patient limited by fatigue  [] Patient limited by pain  [] Patient limited by other medical complications  [] Other:     Prognosis: [x] Good [] Fair  [] Poor    Patient Requires Follow-up: [x] Yes  [] No    Plan:   [x] Continue per plan of care [] Alter current plan (see comments)  [] Plan of care initiated [] Hold pending MD visit [] Discharge  Plan for Next Session:  Progress as tolerated. MD Follow-up: 4+ weeks     Electronically signed by:  Rosanne Gomez ATC     Tx was performed by TISHA as a part of the [facility] program following PT's recommendations/guidance.        Cosigned by:

## 2022-02-01 ENCOUNTER — HOSPITAL ENCOUNTER (OUTPATIENT)
Dept: PHYSICAL THERAPY | Age: 32
Discharge: HOME | End: 2022-02-01

## 2022-02-01 NOTE — FLOWSHEET NOTE
The [address] [place] [address],[address], [address], [address] [place] [address]  Phone: [phone]   Fax:     [phone]      Lower Extremity Daily Performance Training Note  Date:  2022    Patient Name:  Alex Sellers    :  1990  MRN: [id number]  Restrictions/Precautions:   Medical/Treatment Diagnosis Information:      ·   Diagnosis: F91.380D (ICD-10-CM) - Rupture of anterior cruciate ligament of right knee, initial encounter  · Treatment Diagnosis: M25.561 Right knee pain  ·   Insurance/Certification information:   [facility]    Physician Information:    Referring Practitioner: Fuad Murillo MD      Pain level: 010     Visit Number: 5 (5 of 7)     Subjective: Pt feeling good. Objective:  Observation:     Exercises/Interventions:   Exercise/Equipment Resistance/Repetitions Other comments   Stretching/AROM       Bike x5' AROM  / able to complete full revolutions    Calf stretch 30\"hx3 R ^6/15 slant board with protected weight bearing, bilateral axillary crutches.     Hamstring  30\"hx3 R 5/25 Seated EOT, 1/2 roll under ankle    Prone quad strertch 30\"hx3 R 7/30   ITB stretch 30\"hx3 R 12/8 reintroduced d/t symptoms and pt not being compliant with stretch for HEP   Strengthening       SLR abd with hip flex/ext 5# 3x10 R 12/8 side lying    SLR + 30\"hx5 R 11/11 5#   SL bridge        3x threat 3x10 R        3x10 12/8 with BS to maintain level hips   Clams Gray loop 10\"hx10 R ^12/8   Neuromuscular re-ed       SL squat at table 3x10 R 12/8 bilat UE support, decrease depth to 45 deg knee flexion   SL closed chain hamstring Green TB 3x10 R ^12/8   SLS with hip abd 3x10  12/8 withheld band to focus on proper form, completed on airex   Quantum machines       Leg press  120# 5x5 SL R 10 holes show at bottom     140# 3x10 eccentric lower on R   ^ 12/13   Leg extension 25# 3x10 SL R (D,4, 1/2 hole)   35# 3x10 ECC      Leg curl 35# 5x5 SL R  See 1/2 hole on seat 11/30 adjusted weight based on form and fatigue      ^8/2       10/6 Completed with R LE only   Manual interventions        10/14 withheld d/t    R   7/16               Other Therapeutic Activities: Ice 15'    Home Exercise Program: Update as needed. Patient Education:      (1/6): Discussed [facility] program and pt stated goals of getting back into working out and doing classes and being able to be active. Assessment:  [x] Patient tolerated treatment well [] Patient limited by fatigue  [] Patient limited by pain  [] Patient limited by other medical complications  [] Other:     Prognosis: [x] Good [] Fair  [] Poor    Patient Requires Follow-up: [x] Yes  [] No    Plan:   [x] Continue per plan of care [] Alter current plan (see comments)  [] Plan of care initiated [] Hold pending MD visit [] Discharge  Plan for Next Session:  Progress as tolerated. MD Follow-up: 4+ weeks     Electronically signed by:  Sri Raymundo ATC     Tx was performed by TISHA as a part of the [facility] program following PT's recommendations/guidance.        Cosigned by:

## 2022-02-10 ENCOUNTER — HOSPITAL ENCOUNTER (OUTPATIENT)
Dept: PHYSICAL THERAPY | Age: 32
Discharge: HOME | End: 2022-02-10

## 2022-02-10 NOTE — FLOWSHEET NOTE
The [address] [place], [place] [address], [address], [address] [place] [address]  Phone: [phone]   Fax:     [phone]      Lower Extremity Daily Performance Training Note  Date:  2/10/2022    Patient Name:  Susan Campos    :  1990  MRN: [id number]  Restrictions/Precautions:   Medical/Treatment Diagnosis Information:      ·   Diagnosis: D95.893N (ICD-10-CM) - Rupture of anterior cruciate ligament of right knee, initial encounter  · Treatment Diagnosis: M25.561 Right knee pain  ·   Insurance/Certification information:   [facility]    Physician Information:    Referring Practitioner: Deidra Ortiz MD      Pain level: 010     Visit Number: 6 (6 of 7) 2/10     Subjective: Pt feeling good. Objective:  Observation:     Exercises/Interventions:   Exercise/Equipment Resistance/Repetitions Other comments   Stretching/AROM       Bike x5' AROM  7/ able to complete full revolutions    Calf stretch 30\"hx3 R ^6/15 slant board with protected weight bearing, bilateral axillary crutches.     Hamstring  30\"hx3 R 5/25 Seated EOT, 1/2 roll under ankle    Prone quad strertch 30\"hx3 R 7/30   ITB stretch 30\"hx3 R 12/8 reintroduced d/t symptoms and pt not being compliant with stretch for HEP   Strengthening       SLR abd with hip flex/ext 5# 3x10 R 12/8 side lying    SLR + 30\"hx5 R 11/11 5#   SL bridge        3x threat 3x10 R        3x10 12/8 with BS to maintain level hips   Clams Gray loop 10\"hx10 R ^12/8   Neuromuscular re-ed       SL squat at table 3x10 R 12/8 bilat UE support, decrease depth to 45 deg knee flexion   SL closed chain hamstring Green TB 3x10 R ^12/8   SLS with hip abd 3x10  12/8 withheld band to focus on proper form, completed on airex   Lunges  3x10 30#    BOSU Squats  3x10 gray               Quantum machines       Leg press  130# 5x5 SL R 10 holes show at bottom      150# 3x10 eccentric lower on R   ^ 12/13   Leg extension Warm up 20# 10x  35# 3x10 SL R (D,4, 1/2 hole)   45# 3x10 ECC      Leg curl 45# 5x5 SL R  See 1/2 hole on seat 11/30 adjusted weight based on form and fatigue      ^8/2       10/6 Completed with R LE only   Manual interventions        10/14 withheld d/t    R   7/16               Other Therapeutic Activities: Ice 15'    Home Exercise Program: Update as needed. Patient Education:      (1/6): Discussed [facility] program and pt stated goals of getting back into working out and doing classes and being able to be active. Assessment:  [x] Patient tolerated treatment well [] Patient limited by fatigue  [] Patient limited by pain  [] Patient limited by other medical complications  [] Other:     Prognosis: [x] Good [] Fair  [] Poor    Patient Requires Follow-up: [x] Yes  [] No    Plan:   [x] Continue per plan of care [] Alter current plan (see comments)  [] Plan of care initiated [] Hold pending MD visit [] Discharge  Plan for Next Session:  Progress as tolerated. MD Follow-up: 4+ weeks     Electronically signed by:  Rosanne Gomez ATC     Tx was performed by TISHA as a part of the [facility] program following PT's recommendations/guidance.        Cosigned by:

## 2022-02-15 ENCOUNTER — HOSPITAL ENCOUNTER (OUTPATIENT)
Dept: PHYSICAL THERAPY | Age: 32
Discharge: HOME | End: 2022-02-15

## 2022-02-21 ENCOUNTER — HOSPITAL ENCOUNTER (OUTPATIENT)
Dept: PHYSICAL THERAPY | Age: 32
Discharge: HOME | End: 2022-02-21

## 2022-02-21 NOTE — FLOWSHEET NOTE
[address], [address] [place] [address], [address] [place] [address]  Phone: [phone]   Fax:     [phone]      Lower Extremity Daily Performance Training Note  Date: 2/15/2022  Patient Name:  Jorene Crigler    :  1990  MRN: [id number]  Restrictions/Precautions:   Medical/Treatment Diagnosis Information:      ·   Diagnosis: H58.366E (ICD-10-CM) - Rupture of anterior cruciate ligament of right knee, initial encounter  · Treatment Diagnosis: M25.561 Right knee pain  ·   Insurance/Certification information:   [facility]    Physician Information:    Referring Practitioner: Earnest Cole MD      Pain level: 010     Visit Number: 7 (7 of 7) 2/15     Subjective: Pt feeling good. Objective:  Observation:     Exercises/Interventions:   Exercise/Equipment Resistance/Repetitions Other comments   Stretching/AROM       Bike x5' AROM  / able to complete full revolutions    Calf stretch 30\"hx3 R ^6/15 slant board with protected weight bearing, bilateral axillary crutches.     Hamstring  30\"hx3 R 5/25 Seated EOT, 1/2 roll under ankle    Prone quad strertch 30\"hx3 R 7/30   ITB stretch 30\"hx3 R 12/8 reintroduced d/t symptoms and pt not being compliant with stretch for HEP   Strengthening       SLR abd with hip flex/ext 5# 3x10 R 12/8 side lying    SLR + 30\"hx5 R 11/11 5#   SL bridge        3x threat 3x10 R        3x10 12/8 with BS to maintain level hips   Clams Gray loop 10\"hx10 R ^12/8   Neuromuscular re-ed       SL squat at table 3x10 R 12/8 bilat UE support, decrease depth to 45 deg knee flexion   SL closed chain hamstring Green TB 3x10 R ^12/8   SLS with hip abd 3x10  12/8 withheld band to focus on proper form, completed on airex   Lunges  3x10 30#    BOSU Squats  3x10 gray               Quantum machines       Leg press  130# 5x5 SL R 10 holes show at bottom      150# 3x10 eccentric lower on R   ^ 12/13   Leg extension Warm up 20# 10x  35# 3x10 SL R (D,4, 1/2 hole)   45# 3x10 ECC      Leg curl 45# 5x5 SL R  See 1/2 hole on seat 11/30 adjusted weight based on form and fatigue      ^8/2       10/6 Completed with R LE only   Manual interventions        10/14 withheld d/t    R   7/16               Other Therapeutic Activities: Ice 15'    Home Exercise Program: Update as needed. Patient Education:      (1/6): Discussed [facility] program and pt stated goals of getting back into working out and doing classes and being able to be active. Assessment:  [x] Patient tolerated treatment well [] Patient limited by fatigue  [] Patient limited by pain  [] Patient limited by other medical complications  [] Other:     Prognosis: [x] Good [] Fair  [] Poor    Patient Requires Follow-up: [x] Yes  [] No    Plan:   [x] Continue per plan of care [] Alter current plan (see comments)  [] Plan of care initiated [] Hold pending MD visit [] Discharge  Plan for Next Session:  Progress as tolerated. MD Follow-up: 4+ weeks     Electronically signed by:  Gerald Simon ATC     Tx was performed by TISHA as a part of the [employer] program following PT's recommendations/guidance.        Cosigned by:

## 2022-02-21 NOTE — FLOWSHEET NOTE
[facility], [address] [place] [address], [address] [place] [address]  Phone: [phone]   Fax:     [phone]      Lower Extremity Daily Performance Training Note  Date: Date:  2022  Patient Name:  Tushar Jerry    :  1990  MRN: [id number]  Restrictions/Precautions:   Medical/Treatment Diagnosis Information:      ·   Diagnosis: S33.665C (ICD-10-CM) - Rupture of anterior cruciate ligament of right knee, initial encounter  · Treatment Diagnosis: M25.561 Right knee pain  ·   Insurance/Certification information:   [facility]    Physician Information:    Referring Practitioner: Syed Cheatham MD      Pain level: 0/10     Visit Number: 8 (1 of 7)  (not paid due NPV)    Subjective: Pt feeling good. Objective:  Observation:     Exercises/Interventions:   Exercise/Equipment Resistance/Repetitions Other comments   Stretching/AROM       Bike x5' AROM  7/ able to complete full revolutions    Calf stretch 30\"hx3 R ^6/15 slant board with protected weight bearing, bilateral axillary crutches.     Hamstring  30\"hx3 R 5/25 Seated EOT, 1/2 roll under ankle    Prone quad strertch 30\"hx3 R 7/30   ITB stretch 30\"hx3 R 12/8 reintroduced d/t symptoms and pt not being compliant with stretch for HEP   Strengthening       SLR abd with hip flex/ext 5# 3x10 R 12/8 side lying    SLR + 30\"hx5 R 11/11 5#   SL bridge        3x threat 3x10 R        3x10 12/8 with BS to maintain level hips   Clams Gray loop 10\"hx10 R ^12/8   Neuromuscular re-ed       SL squat at table 3x10 R 12/8 bilat UE support, decrease depth to 45 deg knee flexion   SL closed chain hamstring Green TB 3x10 R ^12/8   SLS with hip abd 3x10  12/8 withheld band to focus on proper form, completed on airex   Lunges  3x10 30#    BOSU Squats  3x10 gray               Quantum machines       Leg press  130# 5x5 SL R 10 holes show at bottom      150# 3x10 eccentric lower on R   ^ 12/13   Leg extension Warm up 20# 10x  35# 3x10 SL R (D,4, 1/2 hole)   45# 3x10 ECC      Leg curl 45# 5x5 SL R  See 1/2 hole on seat 11/30 adjusted weight based on form and fatigue      ^8/2       10/6 Completed with R LE only   Manual interventions        10/14 withheld d/t    R   7/16               Other Therapeutic Activities: Ice 15'    Home Exercise Program: Update as needed. Patient Education:      (1/6): Discussed [employer] program and pt stated goals of getting back into working out and doing classes and being able to be active. Assessment:  [x] Patient tolerated treatment well [] Patient limited by fatigue  [] Patient limited by pain  [] Patient limited by other medical complications  [] Other:     Prognosis: [x] Good [] Fair  [] Poor    Patient Requires Follow-up: [x] Yes  [] No    Plan:   [x] Continue per plan of care [] Alter current plan (see comments)  [] Plan of care initiated [] Hold pending MD visit [] Discharge  Plan for Next Session:  Progress as tolerated. MD Follow-up: 4+ weeks     Electronically signed by:  Umer Parham ATC     Tx was performed by TISHA as a part of the [facility] program following PT's recommendations/guidance.        Cosigned by:

## 2022-03-01 ENCOUNTER — HOSPITAL ENCOUNTER (OUTPATIENT)
Dept: PHYSICAL THERAPY | Age: 32
Discharge: HOME | End: 2022-03-01

## 2022-03-01 NOTE — FLOWSHEET NOTE
The [address] [place] [address],[address], [address], [address] [place] [address]  Phone: [phone]   Fax:     [phone]      Lower Extremity Daily Performance Training Note  Date: Date:  3/1/2022  Patient Name:  Akhil Moncada    :  1990  MRN: [id number]  Restrictions/Precautions:   Medical/Treatment Diagnosis Information:      ·   Diagnosis: A38.228O (ICD-10-CM) - Rupture of anterior cruciate ligament of right knee, initial encounter  · Treatment Diagnosis: M25.561 Right knee pain  ·   Insurance/Certification information:   [facility]    Physician Information:    Referring Practitioner: Leonor Mendez MD      Pain level: 0/10     Visit Number: 9 (2 of 7) 3/1 (not paid due NPV)     Subjective: Pt feeling good. Objective:  Observation:     Exercises/Interventions:   Exercise/Equipment Resistance/Repetitions Other comments   Stretching/AROM       Bike x5' AROM  7/2 able to complete full revolutions    Calf stretch 30\"hx3 R ^6/15 slant board with protected weight bearing, bilateral axillary crutches.     Hamstring  30\"hx3 R 5/25 Seated EOT, 1/2 roll under ankle    Prone quad strertch 30\"hx3 R 7/30   ITB stretch 30\"hx3 R 12/8 reintroduced d/t symptoms and pt not being compliant with stretch for HEP   Strengthening       SLR abd with hip flex/ext 5# 3x10 R 12/8 side lying    SLR + 30\"hx5 R 11/11 5#   SL bridge        3x threat 3x10 R        3x10 12/8 with BS to maintain level hips   Clams Gray loop 10\"hx10 R ^12/8   Neuromuscular re-ed       SL squat at table 3x10 R 12/8 bilat UE support, decrease depth to 45 deg knee flexion   SL closed chain hamstring Green TB 3x10 R ^12/8   SLS with hip abd 3x10  12/8 withheld band to focus on proper form, completed on airex   Lunges  3x10 30#    BOSU Squats  3x10 gray               Quantum machines       Leg press  130# 5x5 SL R 10 holes show at bottom      150# 3x10 eccentric lower on R   ^ 12/13   Leg extension Warm up 20# 10x  35# 3x10 SL R (D,4, 1/2 hole)   45# 3x10 ECC      Leg curl 45# 5x5 SL R  See 1/2 hole on seat 11/30 adjusted weight based on form and fatigue      ^8/2       10/6 Completed with R LE only   Manual interventions        10/14 withheld d/t    R   7/16               Other Therapeutic Activities: Ice 15'    Home Exercise Program: Update as needed. Patient Education:      (1/6): Discussed [employer] program and pt stated goals of getting back into working out and doing classes and being able to be active. Assessment:  [x] Patient tolerated treatment well [] Patient limited by fatigue  [] Patient limited by pain  [] Patient limited by other medical complications  [] Other:     Prognosis: [x] Good [] Fair  [] Poor    Patient Requires Follow-up: [x] Yes  [] No    Plan:   [x] Continue per plan of care [] Alter current plan (see comments)  [] Plan of care initiated [] Hold pending MD visit [] Discharge  Plan for Next Session:  Progress as tolerated. MD Follow-up: 4+ weeks     Electronically signed by:  Yuki Orozco ATC     Tx was performed by TISHA as a part of the [employer] program following PT's recommendations/guidance.        Cosigned by:

## 2022-03-10 ENCOUNTER — HOSPITAL ENCOUNTER (OUTPATIENT)
Dept: PHYSICAL THERAPY | Age: 32
Discharge: HOME | End: 2022-03-10

## 2022-03-10 PROCEDURE — 9990000029 HC GAP PACKAGE

## 2022-03-10 NOTE — FLOWSHEET NOTE
The [address] [place] [address], [address] [place] [address], [address] [place] [address]  Phone: [phone]   Fax:     [phone]      Lower Extremity Daily Performance Training Note  Date: Date:  3/10/2022  Patient Name:  Chano Redding    :  1990  MRN: [id number]  Restrictions/Precautions:   Medical/Treatment Diagnosis Information:      ·   Diagnosis: I22.894S (ICD-10-CM) - Rupture of anterior cruciate ligament of right knee, initial encounter  · Treatment Diagnosis: M25.561 Right knee pain  ·   Insurance/Certification information:   [facility]    Physician Information:    Referring Practitioner: John Georges MD      Pain level: 010     Visit Number: 10 (3 of 7) 3/10    Subjective: Pt feeling good. Objective:  Observation:     Exercises/Interventions:   Exercise/Equipment Resistance/Repetitions Other comments   Stretching/AROM       Bike x5' AROM  7/ able to complete full revolutions    Calf stretch 30\"hx3 R ^6/15 slant board with protected weight bearing, bilateral axillary crutches.     Hamstring  30\"hx3 R 5/25 Seated EOT, 1/2 roll under ankle    Prone quad strertch 30\"hx3 R 7/30   ITB stretch 30\"hx3 R 12/8 reintroduced d/t symptoms and pt not being compliant with stretch for HEP   Strengthening       SLR abd with hip flex/ext 5# 3x10 R 12/8 side lying    SLR + 30\"hx5 R 11/11 5#   SL bridge        3x threat 3x10 R        3x10 12/8 with BS to maintain level hips   Clams Gray loop 10\"hx10 R ^12/8   Neuromuscular re-ed       SL squat at table 3x10 R 12/8 bilat UE support, decrease depth to 45 deg knee flexion   SL closed chain hamstring Green TB 3x10 R ^12/8   SLS with hip abd 3x10  12/8 withheld band to focus on proper form, completed on airex   Lunges  3x10 30#    BOSU Squats  3x10 gray               Quantum machines       Leg press  130# 5x5 SL R 10 holes show at bottom      180# 3x10 eccentric lower on R   ^ 12/13   Leg extension Warm up 20# 10x  35# 3x10 SL R (D,4, 1/2 hole)   45# 3x10 ECC      Leg curl 45# 5x5 SL R  See 1/2 hole on seat 11/30 adjusted weight based on form and fatigue      ^8/2       10/6 Completed with R LE only   Manual interventions        10/14 withheld d/t    R   7/16               Other Therapeutic Activities: Ice 15'    Home Exercise Program: Update as needed. Patient Education:      (1/6): Discussed [employer] program and pt stated goals of getting back into working out and doing classes and being able to be active. Assessment:  [x] Patient tolerated treatment well [] Patient limited by fatigue  [] Patient limited by pain  [] Patient limited by other medical complications  [] Other:     Prognosis: [x] Good [] Fair  [] Poor    Patient Requires Follow-up: [x] Yes  [] No    Plan:   [x] Continue per plan of care [] Alter current plan (see comments)  [] Plan of care initiated [] Hold pending MD visit [] Discharge  Plan for Next Session:  Progress as tolerated. MD Follow-up: 4+ weeks     Electronically signed by:  Eric Pinto ATC     Tx was performed by TISHA as a part of the [facility] program following PT's recommendations/guidance.        Cosigned by:

## 2022-04-12 ENCOUNTER — HOSPITAL ENCOUNTER (OUTPATIENT)
Dept: PHYSICAL THERAPY | Age: 32
Discharge: HOME | End: 2022-04-12

## 2022-04-12 NOTE — FLOWSHEET NOTE
The [address] [place] [address],[address], [address], [address] [place] [address]  Phone: [phone]   Fax:     [phone]      Lower Extremity Daily Performance Training Note  Date: Date:  2022  Patient Name:  Sourav Kim    :  1990  MRN: [id number]  Restrictions/Precautions:   Medical/Treatment Diagnosis Information:      ·   Diagnosis: H91.393L (ICD-10-CM) - Rupture of anterior cruciate ligament of right knee, initial encounter  · Treatment Diagnosis: M25.561 Right knee pain  ·   Insurance/Certification information:   [facility]    Physician Information:    Referring Practitioner: Kimberly Claudio MD      Pain level: 010     Visit Number: 33 (4 of 7)     Subjective: Pt feeling good. Objective:  Observation:     Exercises/Interventions:   Exercise/Equipment Resistance/Repetitions Other comments   Stretching/AROM       Bike x5' AROM  7/ able to complete full revolutions    Calf stretch 30\"hx3 R ^6/15 slant board with protected weight bearing, bilateral axillary crutches.     Hamstring  30\"hx3 R 5/25 Seated EOT, 1/2 roll under ankle    Prone quad strertch 30\"hx3 R 7/30   ITB stretch 30\"hx3 R 12/8 reintroduced d/t symptoms and pt not being compliant with stretch for HEP   Strengthening       SLR abd with hip flex/ext 5# 3x10 R 12/8 side lying    SLR + 30\"hx5 R 11/11 5#   SL bridge        3x threat 3x10 R        3x10 12/8 with BS to maintain level hips   Clams Gray loop 10\"hx10 R ^12/8   Neuromuscular re-ed       SL squat at table 3x10 R 12/8 bilat UE support, decrease depth to 45 deg knee flexion   SL closed chain hamstring Green TB 3x10 R ^12/8   SLS with hip abd 3x10  12/8 withheld band to focus on proper form, completed on airex   Lunges  3x10 30#    BOSU Squats  3x10 gray               Quantum machines       Leg press  130# 5x5 SL R 10 holes show at bottom      180# 3x10 eccentric lower on R   ^ 12/13   Leg extension Warm up 20# 10x  35# 3x10 SL R (D,4, 1/2 hole)   45# 3x10 ECC      Leg curl 45# 5x5 SL R  See 1/2 hole on seat 11/30 adjusted weight based on form and fatigue      ^8/2       10/6 Completed with R LE only   Manual interventions        10/14 withheld d/t    R   7/16               Other Therapeutic Activities: Ice 15'    Home Exercise Program: Update as needed. Patient Education:      (1/6): Discussed [facility] program and pt stated goals of getting back into working out and doing classes and being able to be active. Assessment:  [x] Patient tolerated treatment well [] Patient limited by fatigue  [] Patient limited by pain  [] Patient limited by other medical complications  [] Other:     Prognosis: [x] Good [] Fair  [] Poor    Patient Requires Follow-up: [x] Yes  [] No    Plan:   [x] Continue per plan of care [] Alter current plan (see comments)  [] Plan of care initiated [] Hold pending MD visit [] Discharge  Plan for Next Session:  Progress as tolerated. MD Follow-up: 4+ weeks     Electronically signed by:  Shari Garcia ATC     Tx was performed by TISHA as a part of the [facility] program following PT's recommendations/guidance.        Cosigned by:

## 2022-04-19 ENCOUNTER — HOSPITAL ENCOUNTER (OUTPATIENT)
Dept: PHYSICAL THERAPY | Age: 32
Discharge: HOME | End: 2022-04-19

## 2022-04-19 NOTE — FLOWSHEET NOTE
The [address] [place] [address],[address], [address], [address] [place] [address]  Phone: [phone]   Fax:     [phone]      Lower Extremity Daily Performance Training Note  Date: Date:  2022  Patient Name:  Cristiane Sample    :  1990  MRN: [id number]  Restrictions/Precautions:   Medical/Treatment Diagnosis Information:      ·   Diagnosis: Q52.206U (ICD-10-CM) - Rupture of anterior cruciate ligament of right knee, initial encounter  · Treatment Diagnosis: M25.561 Right knee pain  ·   Insurance/Certification information:   [facility]    Physician Information:    Referring Practitioner: Clemente Garcia MD      Pain level: 0/10     Visit Number: 12 (5 of 7)     Subjective: Pt feeling good. Objective:  Observation:     Exercises/Interventions:   Exercise/Equipment Resistance/Repetitions Other comments   Stretching/AROM       Bike x5' AROM  7/ able to complete full revolutions    Calf stretch 30\"hx3 R ^6/15 slant board with protected weight bearing, bilateral axillary crutches.     Hamstring  30\"hx3 R 5/25 Seated EOT, 1/2 roll under ankle    Prone quad strertch 30\"hx3 R 7/30   ITB stretch 30\"hx3 R 12/8 reintroduced d/t symptoms and pt not being compliant with stretch for HEP   Strengthening       SLR abd with hip flex/ext 5# 3x10 R 12/8 side lying    SLR + 30\"hx5 R 11/11 5#   SL bridge        3x threat 3x10 R        3x10 12/8 with BS to maintain level hips   Clams Gray loop 10\"hx10 R ^12/8   Neuromuscular re-ed       SL squat at table 3x10 R 12/8 bilat UE support, decrease depth to 45 deg knee flexion   SL closed chain hamstring Green TB 3x10 R ^12/8   SLS with hip abd 3x10  12/8 withheld band to focus on proper form, completed on airex   Lunges  3x10 30#    BOSU Squats  3x10 gray               Quantum machines       Leg press  160# 5x5 SL R 10 holes show at bottom      200# 3x10 eccentric lower on R   ^ 12/13   Leg extension Warm up 20# 10x  35# 3x10 SL R (D,4, 1/2 hole)   45# 3x10 ECC      Leg curl 45# 5x5 SL R  See 1/2 hole on seat 11/30 adjusted weight based on form and fatigue      ^8/2       10/6 Completed with R LE only   Manual interventions        10/14 withheld d/t    R   7/16               Other Therapeutic Activities: Ice 15'    Home Exercise Program: Update as needed. Patient Education:      (1/6): Discussed [facility] program and pt stated goals of getting back into working out and doing classes and being able to be active. Assessment:  [x] Patient tolerated treatment well [] Patient limited by fatigue  [] Patient limited by pain  [] Patient limited by other medical complications  [] Other:     Prognosis: [x] Good [] Fair  [] Poor    Patient Requires Follow-up: [x] Yes  [] No    Plan:   [x] Continue per plan of care [] Alter current plan (see comments)  [] Plan of care initiated [] Hold pending MD visit [] Discharge  Plan for Next Session:  Progress as tolerated. MD Follow-up: 4+ weeks     Electronically signed by:  Luis Miguel Lazo ATC     Tx was performed by TISHA as a part of the [employer] program following PT's recommendations/guidance.        Cosigned by:

## 2022-04-27 ENCOUNTER — HOSPITAL ENCOUNTER (OUTPATIENT)
Dept: PHYSICAL THERAPY | Age: 32
Discharge: HOME | End: 2022-04-27

## 2022-04-27 NOTE — FLOWSHEET NOTE
3x10 SL R (D,4, 1/2 hole)   45# 3x10 ECC      Leg curl 45# 5x5 SL R  See 1/2 hole on seat 11/30 adjusted weight based on form and fatigue      ^8/2       10/6 Completed with R LE only   Manual interventions        10/14 withheld d/t    R   7/16               Other Therapeutic Activities: Ice 15'    Home Exercise Program: Update as needed. Patient Education:      (1/6): Discussed [facility] program and pt stated goals of getting back into working out and doing classes and being able to be active. Assessment:  [x] Patient tolerated treatment well [] Patient limited by fatigue  [] Patient limited by pain  [] Patient limited by other medical complications  [] Other:     Prognosis: [x] Good [] Fair  [] Poor    Patient Requires Follow-up: [x] Yes  [] No    Plan:   [x] Continue per plan of care [] Alter current plan (see comments)  [] Plan of care initiated [] Hold pending MD visit [] Discharge  Plan for Next Session:  Progress as tolerated. MD Follow-up: 4+ weeks     Electronically signed by:  Sunita Contreras ATC     Tx was performed by TISHA as a part of the [facility] program following PT's recommendations/guidance.        Cosigned by:

## 2022-05-09 ENCOUNTER — HOSPITAL ENCOUNTER (OUTPATIENT)
Dept: PHYSICAL THERAPY | Age: 32
Discharge: HOME | End: 2022-05-09

## 2022-05-09 NOTE — FLOWSHEET NOTE
[facility] 77, [address] [place] [address], [address] [place] [address]  Phone: [phone]   Fax:     [phone]      Lower Extremity Daily Performance Training Note  Date: Date:  2022  Patient Name:  Vaughn Marie    :  1990  MRN: [id number]  Restrictions/Precautions:   Medical/Treatment Diagnosis Information:      ·   Diagnosis: X24.542F (ICD-10-CM) - Rupture of anterior cruciate ligament of right knee, initial encounter  · Treatment Diagnosis: M25.561 Right knee pain  ·   Insurance/Certification information:   [facility]    Physician Information:    Referring Practitioner: Jose Alberto Fernandez MD      Pain level: 010     Visit Number: 41 (7 of 7)     Subjective: Pt feeling good. Objective:  Observation:     Exercises/Interventions:   Exercise/Equipment Resistance/Repetitions Other comments   Stretching/AROM       Bike x5' AROM  7/ able to complete full revolutions    AlterG (100%) 10' (1' walk, 1'jog)         Calf stretch 30\"hx3 R ^6/15 slant board with protected weight bearing, bilateral axillary crutches.     Hamstring  30\"hx3 R 5/ Seated EOT, 1/2 roll under ankle    Prone quad strertch 30\"hx3 R 7/30   ITB stretch 30\"hx3 R 12/8 reintroduced d/t symptoms and pt not being compliant with stretch for HEP   Strengthening       SLR abd with hip flex/ext 5# 3x10 R 12/8 side lying    SLR + 30\"hx5 R 11/11 5#   SL bridge        3x threat 3x10 R        3x10 12/8 with BS to maintain level hips   Clams Gray loop 10\"hx10 R ^12/8   Neuromuscular re-ed       SL squat at table 3x10 R 12/8 bilat UE support, decrease depth to 45 deg knee flexion   SL closed chain hamstring Green TB 3x10 R ^12/8   SLS with hip abd 3x10  12/8 withheld band to focus on proper form, completed on airex   Lunges  3x10 30#    BOSU Squats  3x10 gray               Quantum machines       Leg press  160# 5x5 SL R 10 holes show at bottom      200# 3x10 eccentric lower on R ^ 12/13   Leg extension Warm up 20# 10x  35# 3x10 SL R (D,4, 1/2 hole)   45# 3x10 ECC      Leg curl 45# 5x5 SL R  See 1/2 hole on seat 11/30 adjusted weight based on form and fatigue      ^8/2       10/6 Completed with R LE only   Plyo's:      BLEDepth 2x10  S/S 2x10  FWD/REV 2x10            Agility:    Ladder8'                        Other Therapeutic Activities: Ice 15'    Home Exercise Program: Update as needed. Patient Education:      (1/6): Discussed [employer] program and pt stated goals of getting back into working out and doing classes and being able to be active. Assessment:  [x] Patient tolerated treatment well [] Patient limited by fatigue  [] Patient limited by pain  [] Patient limited by other medical complications  [] Other:     Prognosis: [x] Good [] Fair  [] Poor    Patient Requires Follow-up: [x] Yes  [] No    Plan:   [x] Continue per plan of care [] Alter current plan (see comments)  [] Plan of care initiated [] Hold pending MD visit [] Discharge  Plan for Next Session:  Progress as tolerated. MD Follow-up: 4+ weeks     Electronically signed by:  Santi Gonzales ATC     Tx was performed by TISHA as a part of the [facility] program following PT's recommendations/guidance.        Cosigned by:

## 2022-05-13 ENCOUNTER — HOSPITAL ENCOUNTER (OUTPATIENT)
Dept: PHYSICAL THERAPY | Age: 32
Discharge: HOME | End: 2022-05-13

## 2022-05-13 ENCOUNTER — OFFICE VISIT (OUTPATIENT)
Dept: ORTHOPEDIC SURGERY | Age: 32
End: 2022-05-13
Payer: COMMERCIAL

## 2022-05-13 DIAGNOSIS — M25.561 RIGHT KNEE PAIN, UNSPECIFIED CHRONICITY: Primary | ICD-10-CM

## 2022-05-13 PROCEDURE — 99214 OFFICE O/P EST MOD 30 MIN: CPT | Performed by: ORTHOPAEDIC SURGERY

## 2022-05-13 NOTE — PROGRESS NOTES
Chief Complaint  Follow-up (left knee ACL recon 05/11/2021)      History of Present Illness:  Mona Modi is a pleasant 32 y.o. female who presents today for follow up evaluation of left knee pain. She is 1 year status post right arthroscopy with ACL reconstruction with patellar tendon autograft medial meniscus repair lateral meniscus repair on 5/11/2021. She has been compliant with post operative physical therapy with transition to the [facility] program. She states her progress has been slow but feels she is doing really well. She was able to run recently and feels good but does not have a functional brace yet. Denies any new injuries. Medical History:  Patient's medications, allergies, past medical, surgical, social and family histories were reviewed and updated as appropriate. Pertinent items are noted in HPI  Review of systems reviewed from Patient History Form completed today and available in the patient's chart under the Media tab. Past Medical History:   Diagnosis Date    Uncomplicated asthma         Past Surgical History:   Procedure Laterality Date    ANTERIOR CRUCIATE LIGAMENT REPAIR Right 5/11/2021    RIGHT KNEE ARTHROSOCPY, ANTERIOR CRUCIATE LIGAMENT RECONSTRUCTION PATELLA TENDON, MEDIAL AND LATERAL MENISCUS REPAIR performed by Kd Nieves MD at [address] Right     orf right wrist        No family history on file. Social History     Socioeconomic History    Marital status: Single     Spouse name: Not on file    Number of children: Not on file    Years of education: Not on file    Highest education level: Not on file   Occupational History    Occupation: Nurse Practitioner at [address] Use    Smoking status: Never Smoker    Smokeless tobacco: Never Used   Vaping Use    Vaping Use: Never used   Substance and Sexual Activity    Alcohol use:  Yes     Alcohol/week: 1.0 standard drink     Types: 1 Glasses of wine per week     Comment: 2-3 a week     Drug use: Not Currently    Sexual activity: Not on file   Other Topics Concern    Not on file   Social History Narrative    Not on file     Social Determinants of Health     Financial Resource Strain:     Difficulty of Paying Living Expenses: Not on file   Food Insecurity:     Worried About Running Out of Food in the Last Year: Not on file    Anatoly of Food in the Last Year: Not on file   Transportation Needs:     Lack of Transportation (Medical): Not on file    Lack of Transportation (Non-Medical): Not on file   Physical Activity:     Days of Exercise per Week: Not on file    Minutes of Exercise per Session: Not on file   Stress:     Feeling of Stress : Not on file   Social Connections:     Frequency of Communication with Friends and Family: Not on file    Frequency of Social Gatherings with Friends and Family: Not on file    Attends Yarsanism Services: Not on file    Active Member of [address] [place] [address] Notizza or Organizations: Not on file    Attends Club or Organization Meetings: Not on file    Marital Status: Not on file   Intimate Partner Violence:     Fear of Current or Ex-Partner: Not on file    Emotionally Abused: Not on file    Physically Abused: Not on file    Sexually Abused: Not on file   Housing Stability:     Unable to Pay for Housing in the Last Year: Not on file    [facility] in [facility]: Not on file    Unstable Housing in the Last Year: Not on file       Current Outpatient Medications   Medication Sig Dispense Refill    ondansetron (ZOFRAN) 4 MG tablet Take 1 tablet by mouth every 8 hours as needed for Nausea or Vomiting 30 tablet 0    senna-docusate (PERICOLACE) 8.6-50 MG per tablet Take 2 tablets by mouth daily as needed for Constipation 30 tablet 0    aspirin 325 MG EC tablet Take 1 tablet by mouth daily for 21 days 21 tablet 0     No current facility-administered medications for this visit.        Allergies   Allergen Reactions    Amoxicillin      Hives    Hydrocodone      Facial rash       Vital signs: There were no vitals taken for this visit. RIGHT Knee Exam:    Gait: No use of assistive devices. No antalgic gait. Alignment: normal alignment. Inspection/skin: Skin is intact without erythema or ecchymosis. No gross deformity. Healed surgical incisions. Palpation: no crepitus. no joint line tenderness present. Range of Motion: There is full range of motion. Strength: Normal quadriceps development. Effusion: No effusion or swelling present. Ligamentous stability: No cruciate or collateral ligament instability. Neurologic and vascular: Skin is warm and well-perfused. Sensation is intact to light-touch. Special tests: Negative Kristine sign. LEFT Knee Exam:    Gait: No use of assistive devices. No antalgic gait. Alignment: normal alignment. Inspection/skin: Skin is intact without erythema or ecchymosis. No gross deformity. Palpation: no crepitus. no joint line tenderness present. Range of Motion: There is full range of motion. Strength: Normal quadriceps development. Effusion: No effusion or swelling present. Ligamentous stability: No cruciate or collateral ligament instability. Neurologic and vascular: Skin is warm and well-perfused. Sensation is intact to light-touch. Special tests: Negative Kristine sign. Radiology:     Pertinent imaging was interpreted and reviewed with the patient. Radiographs were obtained, interpreted and reviewed with the patient in the office; 4 views: bilateral PA, bilateral Leon, bilateral Merchants AND right lateral    Impression: No acute bony abnormalities appreciated on radiographic examination. Hardware in good position.            Assessment :  1 year status post right arthroscopy with ACL reconstruction with patellar tendon autograft medial meniscus repair lateral meniscus repair on 5/11/2021    Impression:  Encounter Diagnosis   Name Primary?  Right knee pain, unspecified chronicity Yes       Office Procedures:  Orders Placed This Encounter   Procedures    XR KNEE RIGHT (MIN 4 VIEWS)     Standing Status:   Future     Number of Occurrences:   1     Standing Expiration Date:   5/12/2023     Order Specific Question:   Reason for exam:     Answer:   pain    XR KNEE LEFT (3 VIEWS)     [id number]     Standing Status:   Future     Number of Occurrences:   1     Standing Expiration Date:   5/12/2023     Order Specific Question:   Reason for exam:     Answer:   Pain         Plan: Pertinent imaging was reviewed. The etiology, natural history, and treatment options for the disorder were discussed. The roles of activity medication, antiinflammatories, injections, bracing, physical therapy, and surgical interventions were all described to the patient and questions were answered. Patient is 1 year out from surgery and doing very well. Exam is normal today and she is able to progress back into all her activity as tolerated. We will also measure her to set her up with a functional ACL brace today. I will see her back if symptoms persist or worsen. [facility] is in agreement with this plan. All questions were answered to patient's satisfaction and was encouraged to call with any further questions. Total time spent for evaluation, education and development of treatment plan: 35 minutes        Rachel CONTRERAS ATC, am scribing for and in the presence of Dr. Divina Owens. 05/13/22 10:27 AM Rachel Cohen ATC    I attest that I met personally with the patient, performed the described exam, reviewed the radiographic studies and medical records associated with this patient and supervised the services that are described above.      Johanna Fajardo MD

## 2022-05-13 NOTE — FLOWSHEET NOTE
The [address] [place] [address],[address], 800 [facility] 3360 [facility], [address] [place] [address]  Phone: [phone]   Fax:     [phone]      Lower Extremity Daily Performance Training Note  Date: Date:  2022  Patient Name:  Mona Modi    :  1990  MRN: [id number]  Restrictions/Precautions:   Medical/Treatment Diagnosis Information:      ·   Diagnosis: X61.112A (ICD-10-CM) - Rupture of anterior cruciate ligament of right knee, initial encounter  · Treatment Diagnosis: M25.561 Right knee pain  ·   Insurance/Certification information:   [facility]    Physician Information:    Referring Practitioner: Joyce Lyon MD      Pain level: 0/10     Visit Number: 15 (1 of 7)  (not paid due NPV)    Subjective: Pt feeling good. Objective:  Observation:     Exercises/Interventions:   Exercise/Equipment Resistance/Repetitions Other comments   Stretching/AROM       Bike x5' AROM  7/2 able to complete full revolutions    AlterG (100%) 10' (1' walk, 1'jog) 5        Calf stretch 30\"hx3 R ^6/15 slant board with protected weight bearing, bilateral axillary crutches.     Hamstring  30\"hx3 R 5/25 Seated EOT, 1/2 roll under ankle    Prone quad strertch 30\"hx3 R 7/30   ITB stretch 30\"hx3 R 12/8 reintroduced d/t symptoms and pt not being compliant with stretch for HEP   Strengthening       SLR abd with hip flex/ext 5# 3x10 R 12/8 side lying    SLR + 30\"hx5 R 11/11 5#   SL bridge        3x threat 3x10 R        3x10 12/8 with BS to maintain level hips   Clams Gray loop 10\"hx10 R ^12/8   Neuromuscular re-ed       SL squat at table 3x10 R 12/8 bilat UE support, decrease depth to 45 deg knee flexion   SL closed chain hamstring Green TB 3x10 R ^12/8   SLS with hip abd 3x10  12/8 withheld band to focus on proper form, completed on airex   Lunges  3x10 30#    BOSU Squats  3x10 gray               Quantum machines       Leg press  160# 5x5 SL R 10 holes show at bottom      200# 3x10 eccentric lower on R   ^ 12/13   Leg extension Warm up 20# 10x  35# 3x10 SL R (D,4, 1/2 hole)   45# 3x10 ECC      Leg curl 45# 5x5 SL R  See 1/2 hole on seat 11/30 adjusted weight based on form and fatigue      ^8/2       10/6 Completed with R LE only   Plyo's:      BLEDepth 2x10  S/S 2x10  FWD/REV 2x10            Agility:    Ladder8'                        Other Therapeutic Activities: Ice 15'    Home Exercise Program: Update as needed. Patient Education:      (1/6): Discussed [facility] program and pt stated goals of getting back into working out and doing classes and being able to be active. Assessment:  [x] Patient tolerated treatment well [] Patient limited by fatigue  [] Patient limited by pain  [] Patient limited by other medical complications  [] Other:     Prognosis: [x] Good [] Fair  [] Poor    Patient Requires Follow-up: [x] Yes  [] No    Plan:   [x] Continue per plan of care [] Alter current plan (see comments)  [] Plan of care initiated [] Hold pending MD visit [] Discharge  Plan for Next Session:  Progress as tolerated. MD Follow-up: 4+ weeks     Electronically signed by:  Yvonne Oconnor ATC     Tx was performed by TISHA as a part of the [employer] program following PT's recommendations/guidance.        Cosigned by:

## 2022-05-16 DIAGNOSIS — Z98.890 STATUS POST RECONSTRUCTION OF ANTERIOR CRUCIATE LIGAMENT: Primary | ICD-10-CM

## 2022-06-01 ENCOUNTER — TELEPHONE (OUTPATIENT)
Dept: ORTHOPEDIC SURGERY | Age: 32
End: 2022-06-01

## 2022-06-01 NOTE — TELEPHONE ENCOUNTER
General Question     Subject: PATIENT WOULD LIKE TO SPEAK WITH SOMEONE REGARDING BRACE.  PLEASE ADVISE   Patient: Vijaya Manzanares  Contact Number: [phone]

## 2022-06-02 ENCOUNTER — HOSPITAL ENCOUNTER (OUTPATIENT)
Dept: PHYSICAL THERAPY | Age: 32
Discharge: HOME | End: 2022-06-02

## 2022-06-02 NOTE — FLOWSHEET NOTE
The [address] [place], [place] [address], [address], [address] [place] [address]  Phone: [phone]   Fax:     [phone]      Lower Extremity Daily Performance Training Note  Date: Date:  2022  Patient Name:  Elise Haile    :  1990  MRN: [id number]  Restrictions/Precautions:   Medical/Treatment Diagnosis Information:      ·   Diagnosis: F33.426J (ICD-10-CM) - Rupture of anterior cruciate ligament of right knee, initial encounter  · Treatment Diagnosis: M25.561 Right knee pain  ·   Insurance/Certification information:   [facility]    Physician Information:    Referring Practitioner: Ahmet Madera MD      Pain level: 010     Visit Number: 15 (1 of 7)  (not paid due NPV)    Subjective: Pt feeling good. Objective:  Observation:     Exercises/Interventions:   Exercise/Equipment Resistance/Repetitions Other comments   Stretching/AROM       Bike x5' AROM  7/2 able to complete full revolutions    AlterG (100%) 10' (1' walk, 1'jog) 5/        Calf stretch 30\"hx3 R ^6/15 slant board with protected weight bearing, bilateral axillary crutches.     Hamstring  30\"hx3 R 5/25 Seated EOT, 1/2 roll under ankle    Prone quad strertch 30\"hx3 R 7/30   ITB stretch 30\"hx3 R 12/8 reintroduced d/t symptoms and pt not being compliant with stretch for HEP   Strengthening       SLR abd with hip flex/ext 5# 3x10 R 12/8 side lying    SLR + 30\"hx5 R 11/11 5#   SL bridge        3x threat 3x10 R        3x10 12/8 with BS to maintain level hips   Clams Gray loop 10\"hx10 R ^12/8   Neuromuscular re-ed       SL squat at table 3x10 R 12/8 bilat UE support, decrease depth to 45 deg knee flexion   SL closed chain hamstring Green TB 3x10 R ^12/8   SLS with hip abd 3x10  12/8 withheld band to focus on proper form, completed on airex   Lunges  3x10 30#    BOSU Squats  3x10 gray               Quantum machines       Leg press  170# 5x5 SL R 10 holes show at bottom      200# 3x10 eccentric lower on R   ^ 12/13   Leg extension Warm up 20# 10x  35# 3x10 SL R (D,4, 1/2 hole)   45# 3x10 ECC      Leg curl 45# 5x5 SL R  See 1/2 hole on seat 11/30 adjusted weight based on form and fatigue      ^8/2       10/6 Completed with R LE only   Plyo's:      BLEDepth 2x10  S/S 2x10  FWD/REV 2x10            Agility:    Ladder8'                        Other Therapeutic Activities: Ice 15'    Home Exercise Program: Update as needed. Patient Education:      (1/6): Discussed [employer] program and pt stated goals of getting back into working out and doing classes and being able to be active. Assessment:  [x] Patient tolerated treatment well [] Patient limited by fatigue  [] Patient limited by pain  [] Patient limited by other medical complications  [] Other:     Prognosis: [x] Good [] Fair  [] Poor    Patient Requires Follow-up: [x] Yes  [] No    Plan:   [x] Continue per plan of care [] Alter current plan (see comments)  [] Plan of care initiated [] Hold pending MD visit [] Discharge  Plan for Next Session:  Progress as tolerated. MD Follow-up: 4+ weeks     Electronically signed by:  Paul Villalobos ATC     Tx was performed by TISHA as a part of the [facility] [place] program following PT's recommendations/guidance.        Cosigned by:

## 2022-06-08 ENCOUNTER — HOSPITAL ENCOUNTER (OUTPATIENT)
Dept: PHYSICAL THERAPY | Age: 32
Discharge: HOME | End: 2022-06-08

## 2022-06-08 DIAGNOSIS — Z98.890 STATUS POST RECONSTRUCTION OF ANTERIOR CRUCIATE LIGAMENT: ICD-10-CM

## 2022-06-08 PROCEDURE — L1845 KO DOUBLE UPRIGHT PRE CST: HCPCS | Performed by: ORTHOPAEDIC SURGERY

## 2022-06-08 NOTE — FLOWSHEET NOTE
The [address] [place] [address] 200, [address] [place] [address]  Phone: [phone]   Fax:     [phone]      Lower Extremity Daily Performance Training Note  Date: Date:  2022  Patient Name:  Lorraine Phelps    :  1990  MRN: [id number]  Restrictions/Precautions:   Medical/Treatment Diagnosis Information:      ·   Diagnosis: O42.964J (ICD-10-CM) - Rupture of anterior cruciate ligament of right knee, initial encounter  · Treatment Diagnosis: M25.561 Right knee pain  ·   Insurance/Certification information:   [facility]    Physician Information:    Referring Practitioner: Giuliano Son MD      Pain level: 0/10     Visit Number: 16 (2 of 7)  (not paid due NPV)    Subjective: Pt feeling good. Objective:  Observation:     Exercises/Interventions:   Exercise/Equipment Resistance/Repetitions Other comments   Stretching/AROM       Bike x5' AROM  7/2 able to complete full revolutions    AlterG (100%) 10' (1' walk, 1'jog) 5/9        Calf stretch 30\"hx3 R ^6/15 slant board with protected weight bearing, bilateral axillary crutches.     Hamstring  30\"hx3 R 5/25 Seated EOT, 1/2 roll under ankle    Prone quad strertch 30\"hx3 R 7/30   ITB stretch 30\"hx3 R 12/8 reintroduced d/t symptoms and pt not being compliant with stretch for HEP   Strengthening       SLR abd with hip flex/ext 5# 3x10 R 12/8 side lying    SLR + 30\"hx5 R 11/11 5#   SL bridge        3x threat 3x10 R        3x10 12/8 with BS to maintain level hips   Clams Gray loop 10\"hx10 R ^12/8   Neuromuscular re-ed       SL squat at table 3x10 R 12/8 bilat UE support, decrease depth to 45 deg knee flexion   SL closed chain hamstring Green TB 3x10 R ^12/8   SLS with hip abd 3x10  12/8 withheld band to focus on proper form, completed on airex   Lunges  3x10 30#    BOSU Squats  3x10 gray               Quantum machines       Leg press  170# 5x5 SL R 10 holes show at bottom      220# 3x10 eccentric lower on R   ^ 12/13   Leg extension Warm up 20# 10x  40# 3x10 SL R (D,4, 1/2 hole)   50# 3x10 ECC      Leg curl 50# 5x5 SL R  See 1/2 hole on seat 11/30 adjusted weight based on form and fatigue      ^8/2       10/6 Completed with R LE only   Plyo's:      BLEDepth 2x10  S/S 2x10  FWD/REV 2x10            Agility:    Ladder8'                        Other Therapeutic Activities: Ice 15'    Home Exercise Program: Update as needed. Patient Education:      (1/6): Discussed [facility] program and pt stated goals of getting back into working out and doing classes and being able to be active. Assessment:  [x] Patient tolerated treatment well [] Patient limited by fatigue  [] Patient limited by pain  [] Patient limited by other medical complications  [] Other:     Prognosis: [x] Good [] Fair  [] Poor    Patient Requires Follow-up: [x] Yes  [] No    Plan:   [x] Continue per plan of care [] Alter current plan (see comments)  [] Plan of care initiated [] Hold pending MD visit [] Discharge  Plan for Next Session:  Progress as tolerated. MD Follow-up: 4+ weeks     Electronically signed by:  Javi Maciel ATC     Tx was performed by TISHA as a part of the [facility] program following PT's recommendations/guidance.        Cosigned by:

## 2022-06-20 ENCOUNTER — HOSPITAL ENCOUNTER (OUTPATIENT)
Dept: PHYSICAL THERAPY | Age: 32
Discharge: HOME | End: 2022-06-20

## 2022-06-20 NOTE — FLOWSHEET NOTE
The [facility] 77, [address] [email or web address] [address] [place] [address], [address] [place] [address]  Phone: [phone]   Fax:     [phone]      Lower Extremity Daily Performance Training Note  Date: Date:  2022  Patient Name:  Juan A Griffith    :  1990  MRN: [id number]  Restrictions/Precautions:   Medical/Treatment Diagnosis Information:      ·   Diagnosis: D70.258S (ICD-10-CM) - Rupture of anterior cruciate ligament of right knee, initial encounter  · Treatment Diagnosis: M25.561 Right knee pain  ·   Insurance/Certification information:   [facility]    Physician Information:    Referring Practitioner: Antonio Ventura MD      Pain level: 0/10     Visit Number: 61 (3 of 7)  (not paid due NPV)    Subjective: Pt feeling good. Objective:  Observation:     Exercises/Interventions:   Exercise/Equipment Resistance/Repetitions Other comments   Stretching/AROM       Bike x5' AROM  7/2 able to complete full revolutions    AlterG (100%) 10' (1' walk, 1'jog) 5/9        Calf stretch 30\"hx3 R ^6/15 slant board with protected weight bearing, bilateral axillary crutches.     Hamstring  30\"hx3 R 5/25 Seated EOT, 1/2 roll under ankle    Prone quad strertch 30\"hx3 R 7/30   ITB stretch 30\"hx3 R 12/8 reintroduced d/t symptoms and pt not being compliant with stretch for HEP   Strengthening       SLR abd with hip flex/ext 5# 3x10 R 12/8 side lying    SLR + 30\"hx5 R 11/11 5#   SL bridge        3x threat 3x10 R        3x10 12/8 with BS to maintain level hips   Clams Gray loop 10\"hx10 R ^12/8   Neuromuscular re-ed       SL squat at table 3x10 R 12/8 bilat UE support, decrease depth to 45 deg knee flexion   SL closed chain hamstring Green TB 3x10 R ^12/8   SLS with hip abd 3x10  12/8 withheld band to focus on proper form, completed on airex   Lunges  3x10 30#    BOSU Squats  3x10 gray               Quantum machines       Leg press  170# 5x5 SL R 10 holes show at bottom      220# 3x10 eccentric lower on R   ^ 12/13   Leg extension Warm up 20# 10x  40# 3x10 SL R (D,4, 1/2 hole)   50# 3x10 ECC      Leg curl 50# 5x5 SL R  See 1/2 hole on seat 11/30 adjusted weight based on form and fatigue      ^8/2       10/6 Completed with R LE only   Plyo's:      BLEDepth 2x10  S/S 2x10  FWD/REV 2x10            Agility:    Ladder8'                        Other Therapeutic Activities: Ice 15'    Home Exercise Program: Update as needed. Patient Education:      (1/6): Discussed [facility] program and pt stated goals of getting back into working out and doing classes and being able to be active. Assessment:  [x] Patient tolerated treatment well [] Patient limited by fatigue  [] Patient limited by pain  [] Patient limited by other medical complications  [] Other:     Prognosis: [x] Good [] Fair  [] Poor    Patient Requires Follow-up: [x] Yes  [] No    Plan:   [x] Continue per plan of care [] Alter current plan (see comments)  [] Plan of care initiated [] Hold pending MD visit [] Discharge  Plan for Next Session:  Progress as tolerated. MD Follow-up: 4+ weeks     Electronically signed by:  Luis Miguel Lazo ATC     Tx was performed by TISHA as a part of the [facility] program following PT's recommendations/guidance.        Cosigned by:

## 2022-06-28 ENCOUNTER — HOSPITAL ENCOUNTER (OUTPATIENT)
Dept: PHYSICAL THERAPY | Age: 32
Discharge: HOME | End: 2022-06-28

## 2022-06-28 PROCEDURE — 9990000029 HC GAP PACKAGE

## 2022-06-28 NOTE — FLOWSHEET NOTE
The [address] [place], [place] [address], 800 [facility] 3360 [facility], [address] [place] [address]  Phone: [phone]   Fax:     [phone]      Lower Extremity Daily Performance Training Note  Date: Date:  2022  Patient Name:  Imelda Siu    :  1990  MRN: [id number]  Restrictions/Precautions:   Medical/Treatment Diagnosis Information:      ·   Diagnosis: D97.132C (ICD-10-CM) - Rupture of anterior cruciate ligament of right knee, initial encounter  · Treatment Diagnosis: M25.561 Right knee pain  ·   Insurance/Certification information:   [facility]    Physician Information:    Referring Practitioner: Donnell Anglin MD      Pain level: 0/10     Visit Number: 94 (4 of 7)     Subjective: Pt feeling good. Objective:  Observation:     Exercises/Interventions:   Exercise/Equipment Resistance/Repetitions Other comments   Stretching/AROM       Bike x5' AROM  7/2 able to complete full revolutions    AlterG (100%) 10' (1' walk, 1'jog) 5/        Calf stretch 30\"hx3 R ^6/15 slant board with protected weight bearing, bilateral axillary crutches.     Hamstring  30\"hx3 R 5/ Seated EOT, 1/2 roll under ankle    Prone quad strertch 30\"hx3 R 7/30   ITB stretch 30\"hx3 R 12/8 reintroduced d/t symptoms and pt not being compliant with stretch for HEP   Strengthening       SLR abd with hip flex/ext 5# 3x10 R 12/8 side lying    SLR + 30\"hx5 R 11/11 5#   SL bridge        3x threat 3x10 R        3x10 12/8 with BS to maintain level hips   Clams Gray loop 10\"hx10 R ^12/8   Neuromuscular re-ed       SL squat at table 3x10 R 12/8 bilat UE support, decrease depth to 45 deg knee flexion   SL closed chain hamstring Green TB 3x10 R ^12/8   SLS with hip abd 3x10  12/8 withheld band to focus on proper form, completed on airex   Lunges  3x10 30#    BOSU Squats  3x10 gray               Quantum machines       Leg press  170# 5x5 SL R 10 holes show at bottom      220# 3x10 eccentric lower on R ^ 12/13   Leg extension Warm up 20# 10x  40# 3x10 SL R (D,4, 1/2 hole)   50# 3x10 ECC      Leg curl 50# 5x5 SL R  See 1/2 hole on seat 11/30 adjusted weight based on form and fatigue      ^8/2       10/6 Completed with R LE only   Plyo's:      BLEDepth 2x10  S/S 2x10  FWD/REV 2x10            Agility:    Ladder8'                        Other Therapeutic Activities: Ice 15'    Home Exercise Program: Update as needed. Patient Education:      (1/6): Discussed [facility] program and pt stated goals of getting back into working out and doing classes and being able to be active. Assessment:  [x] Patient tolerated treatment well [] Patient limited by fatigue  [] Patient limited by pain  [] Patient limited by other medical complications  [] Other:     Prognosis: [x] Good [] Fair  [] Poor    Patient Requires Follow-up: [x] Yes  [] No    Plan:   [x] Continue per plan of care [] Alter current plan (see comments)  [] Plan of care initiated [] Hold pending MD visit [] Discharge  Plan for Next Session:  Progress as tolerated. MD Follow-up: 4+ weeks     Electronically signed by:  Sapna Do ATC     Tx was performed by TISHA as a part of the [facility] program following PT's recommendations/guidance.        Cosigned by:

## 2022-07-06 ENCOUNTER — HOSPITAL ENCOUNTER (OUTPATIENT)
Dept: PHYSICAL THERAPY | Age: 32
Discharge: HOME | End: 2022-07-06

## 2022-07-06 NOTE — FLOWSHEET NOTE
The [address] [place] [address],[address], 800 [facility] 3360 [facility], [address] [place] [address]  Phone: [phone]   Fax:     [phone]      Lower Extremity Daily Performance Training Note  Date: Date:  2022  Patient Name:  Delvin Gibson    :  1990  MRN: [id number]  Restrictions/Precautions:   Medical/Treatment Diagnosis Information:      ·   Diagnosis: S17.311B (ICD-10-CM) - Rupture of anterior cruciate ligament of right knee, initial encounter  · Treatment Diagnosis: M25.561 Right knee pain  ·   Insurance/Certification information:   [facility]    Physician Information:    Referring Practitioner: Danial Kong MD      Pain level: 0/10     Visit Number: 35 (5 of 7)     Subjective: Pt feeling good. Objective:  Observation:     Exercises/Interventions:   Exercise/Equipment Resistance/Repetitions Other comments   Stretching/AROM       Bike x5' AROM  7/ able to complete full revolutions    AlterG (100%) 10' (1' walk, 1'jog) 5        Calf stretch 30\"hx3 R ^6/15 slant board with protected weight bearing, bilateral axillary crutches.     Hamstring  30\"hx3 R 5/ Seated EOT, 1/2 roll under ankle    Prone quad strertch 30\"hx3 R 7/30   ITB stretch 30\"hx3 R 12/8 reintroduced d/t symptoms and pt not being compliant with stretch for HEP   Strengthening       SLR abd with hip flex/ext 5# 3x10 R 12/8 side lying    SLR + 30\"hx5 R 11/11 5#   SL bridge        3x threat 3x10 R        3x10 12/8 with BS to maintain level hips   Clams Gray loop 10\"hx10 R ^12/8   Neuromuscular re-ed       SL squat at table 3x10 R 12/8 bilat UE support, decrease depth to 45 deg knee flexion   SL closed chain hamstring Green TB 3x10 R ^12/8   SLS with hip abd 3x10  12/8 withheld band to focus on proper form, completed on airex   Lunges  3x10 30#    BOSU Squats  3x10 gray               Quantum machines       Leg press  170# 5x5 SL R 10 holes show at bottom      220# 3x10 eccentric lower on R ^ 12/13   Leg extension Warm up 20# 10x  40# 3x10 SL R (D,4, 1/2 hole)   50# 3x10 ECC      Leg curl 50# 5x5 SL R  See 1/2 hole on seat 11/30 adjusted weight based on form and fatigue      ^8/2       10/6 Completed with R LE only   Plyo's:      BLEDepth 2x10  S/S 2x10  FWD/REV 2x10            Agility:    Ladder8'                        Other Therapeutic Activities: Ice 15'    Home Exercise Program: Update as needed. Patient Education:      (1/6): Discussed [employer] program and pt stated goals of getting back into working out and doing classes and being able to be active. Assessment:  [x] Patient tolerated treatment well [] Patient limited by fatigue  [] Patient limited by pain  [] Patient limited by other medical complications  [] Other:     Prognosis: [x] Good [] Fair  [] Poor    Patient Requires Follow-up: [x] Yes  [] No    Plan:   [x] Continue per plan of care [] Alter current plan (see comments)  [] Plan of care initiated [] Hold pending MD visit [] Discharge  Plan for Next Session:  Progress as tolerated. MD Follow-up: 4+ weeks     Electronically signed by:  Meño Costello ATC     Tx was performed by TISHA as a part of the [facility] program following PT's recommendations/guidance.        Cosigned by:

## 2022-07-13 ENCOUNTER — HOSPITAL ENCOUNTER (OUTPATIENT)
Dept: PHYSICAL THERAPY | Age: 32
Discharge: HOME | End: 2022-07-13

## 2022-07-13 NOTE — FLOWSHEET NOTE
The [address] [place] [address],[address], 800 [facility] 3360 [facility], [address] [place] [address]  Phone: [phone]   Fax:     [phone]      Lower Extremity Daily Performance Training Note  Date: Date:  2022  Patient Name:  Gianna Walsh    :  1990  MRN: [id number]  Restrictions/Precautions:   Medical/Treatment Diagnosis Information:        Diagnosis: B85.205F (ICD-10-CM) - Rupture of anterior cruciate ligament of right knee, initial encounter  Treatment Diagnosis: M25.561 Right knee pain    Insurance/Certification information:   [facility]    Physician Information:    Referring Practitioner: Juan Francisco Del Cid MD      Pain level: 0/10     Visit Number: 20 (6 of 7)     Subjective: Pt feeling good. Objective:  Observation:     Exercises/Interventions:   Exercise/Equipment Resistance/Repetitions Other comments   Stretching/AROM       Bike x5' AROM  7/2 able to complete full revolutions    AlterG (100%) 10' (1' walk, 1'jog) 5/9        Calf stretch 30\"hx3 R ^6/15 slant board with protected weight bearing, bilateral axillary crutches.     Hamstring  30\"hx3 R 5/25 Seated EOT, 1/2 roll under ankle    Prone quad strertch 30\"hx3 R 7/30   ITB stretch 30\"hx3 R 12/8 reintroduced d/t symptoms and pt not being compliant with stretch for HEP   Strengthening       SLR abd with hip flex/ext 5# 3x10 R 12/8 side lying    SLR + 30\"hx5 R 11/11 5#   SL bridge        3x threat 3x10 R        3x10 12/8 with BS to maintain level hips   Clams Gray loop 10\"hx10 R ^12/8   Neuromuscular re-ed       SL squat at table 3x10 R 12/8 bilat UE support, decrease depth to 45 deg knee flexion   SL closed chain hamstring Green TB 3x10 R ^12/8   SLS with hip abd 3x10  12/8 withheld band to focus on proper form, completed on airex   Lunges  3x10 30#    BOSU Squats  3x10 gray               Quantum machines       Leg press  170# 5x5 SL R 10 holes show at bottom      220# 3x10 eccentric lower on R   ^ 12/13   Leg extension Warm up 20# 10x  40# 3x10 SL R (D,4, 1/2 hole)   50# 3x10 ECC      Leg curl 50# 5x5 SL R  See 1/2 hole on seat 11/30 adjusted weight based on form and fatigue    ^8/2   10/6 Completed with R LE only   Plyo's:      BLE Depth 2x10  S/S 2x10  FWD/REV 2x10            Agility:     Ladder 8'                        Other Therapeutic Activities: Ice 15'    Home Exercise Program: Update as needed. Patient Education:      (1/6): Discussed [employer] program and pt stated goals of getting back into working out and doing classes and being able to be active. Assessment:  [x] Patient tolerated treatment well [] Patient limited by fatigue  [] Patient limited by pain  [] Patient limited by other medical complications  [] Other:     Prognosis: [x] Good [] Fair  [] Poor    Patient Requires Follow-up: [x] Yes  [] No    Plan:   [x] Continue per plan of care [] Alter current plan (see comments)  [] Plan of care initiated [] Hold pending MD visit [] Discharge  Plan for Next Session:  Progress as tolerated. MD Follow-up: 4+ weeks     Electronically signed by:  Dayanara Aguilar ATC     Tx was performed by TISHA as a part of the [facility] program following PT's recommendations/guidance.        Cosigned by:

## 2022-07-22 ENCOUNTER — HOSPITAL ENCOUNTER (OUTPATIENT)
Dept: PHYSICAL THERAPY | Age: 32
Discharge: HOME | End: 2022-07-22

## 2022-07-22 NOTE — FLOWSHEET NOTE
The [address] [place], [place] [address], 800 [address], [address] [place] [address]  Phone: [phone]   Fax:     [phone]      Lower Extremity Daily Performance Training Note  Date: Date:  2022  Patient Name:  Oskar Barrios    :  1990  MRN: [id number]  Restrictions/Precautions:   Medical/Treatment Diagnosis Information:        Diagnosis: T01.772L (ICD-10-CM) - Rupture of anterior cruciate ligament of right knee, initial encounter  Treatment Diagnosis: M25.561 Right knee pain    Insurance/Certification information:   [facility]    Physician Information:    Referring Practitioner: Robyn Hooks MD      Pain level: 0/10     Visit Number: 21 (7 of 7)     Subjective: Pt feeling good. Objective:  Observation:     Exercises/Interventions:   Exercise/Equipment Resistance/Repetitions Other comments   Stretching/AROM       Bike x5' AROM  7/ able to complete full revolutions    AlterG (100%) 10' (1' walk, 1'jog) 5/        Calf stretch 30\"hx3 R ^6/15 slant board with protected weight bearing, bilateral axillary crutches.     Hamstring  30\"hx3 R 5/25 Seated EOT, 1/2 roll under ankle    Prone quad strertch 30\"hx3 R 7/30   ITB stretch 30\"hx3 R 12/8 reintroduced d/t symptoms and pt not being compliant with stretch for HEP   Strengthening       SLR abd with hip flex/ext 5# 3x10 R 12/8 side lying    SLR + 30\"hx5 R 11/11 5#   SL bridge        3x threat 3x10 R        3x10 12/8 with BS to maintain level hips   Clams Gray loop 10\"hx10 R ^12/8   Neuromuscular re-ed       SL squat at table 3x10 R 12/8 bilat UE support, decrease depth to 45 deg knee flexion   SL closed chain hamstring Green TB 3x10 R ^12/8   SLS with hip abd 3x10  12/8 withheld band to focus on proper form, completed on airex   Lunges  3x10 30#    BOSU Squats  3x10 gray               Quantum machines       Leg press  170# 5x5 SL R 10 holes show at bottom      220# 3x10 eccentric lower on R   ^ 12/13   Leg extension Warm up 20# 10x  40# 3x10 SL R (D,4, 1/2 hole)   50# 3x10 ECC      Leg curl 50# 5x5 SL R  See 1/2 hole on seat 11/30 adjusted weight based on form and fatigue    ^8/2   10/6 Completed with R LE only   Plyo's:      BLE Depth 2x10  S/S 2x10  FWD/REV 2x10            Agility:     Ladder 8'                        Other Therapeutic Activities: Ice 15'    Home Exercise Program: Update as needed. Patient Education:      (1/6): Discussed [employer] program and pt stated goals of getting back into working out and doing classes and being able to be active. Assessment:  [x] Patient tolerated treatment well [] Patient limited by fatigue  [] Patient limited by pain  [] Patient limited by other medical complications  [] Other:     Prognosis: [x] Good [] Fair  [] Poor    Patient Requires Follow-up: [x] Yes  [] No    Plan:   [x] Continue per plan of care [] Alter current plan (see comments)  [] Plan of care initiated [] Hold pending MD visit [] Discharge  Plan for Next Session:  Progress as tolerated. MD Follow-up: 4+ weeks     Electronically signed by:  Jimmy Aguilar ATC     Tx was performed by TISHA as a part of the [facility] program following PT's recommendations/guidance.        Cosigned by:

## 2022-07-29 ENCOUNTER — HOSPITAL ENCOUNTER (OUTPATIENT)
Dept: PHYSICAL THERAPY | Age: 32
Discharge: HOME | End: 2022-07-29

## 2022-07-29 NOTE — FLOWSHEET NOTE
[facility] 77, [address] [place] [address], [address] [place] [address]  Phone: [phone]   Fax:     [phone]      Lower Extremity Daily Performance Training Note  Date: Date:  2022  Patient Name:  Shayan Vargas    :  1990  MRN: [id number]  Restrictions/Precautions:   Medical/Treatment Diagnosis Information:        Diagnosis: L44.974E (ICD-10-CM) - Rupture of anterior cruciate ligament of right knee, initial encounter  Treatment Diagnosis: M25.561 Right knee pain    Insurance/Certification information:   [facility]    Physician Information:    Referring Practitioner: Brian Munoz MD      Pain level: 010     Visit Number: 22 (1 of 7)  (not paid due NPV)    Subjective: Pt feeling good. Objective:  Observation:     Exercises/Interventions:   Exercise/Equipment Resistance/Repetitions Other comments   Stretching/AROM       Bike x5' AROM  7/ able to complete full revolutions    AlterG (100%) 10' (1' walk, 1'jog) 5        Calf stretch 30\"hx3 R ^6/15 slant board with protected weight bearing, bilateral axillary crutches.     Hamstring  30\"hx3 R 5/ Seated EOT, 1/2 roll under ankle    Prone quad strertch 30\"hx3 R 7/30   ITB stretch 30\"hx3 R 12/8 reintroduced d/t symptoms and pt not being compliant with stretch for HEP   Strengthening       SLR abd with hip flex/ext 5# 3x10 R 12/8 side lying    SLR + 30\"hx5 R 11/11 5#   SL bridge        3x threat 3x10 R        3x10 12/8 with BS to maintain level hips   Clams Gray loop 10\"hx10 R ^12/8   Neuromuscular re-ed       SL squat at table 3x10 R 12/8 bilat UE support, decrease depth to 45 deg knee flexion   SL closed chain hamstring Green TB 3x10 R ^12/8   SLS with hip abd 3x10  12/8 withheld band to focus on proper form, completed on airex   Lunges  3x10 30#    BOSU Squats  3x10 gray               Quantum machines       Leg press  170# 5x5 SL R 10 holes show at bottom      220# 3x10 eccentric lower on R   ^ 12/13   Leg extension Warm up 20# 10x  40# 3x10 SL R (D,4, 1/2 hole)   50# 3x10 ECC      Leg curl 50# 5x5 SL R  See 1/2 hole on seat 11/30 adjusted weight based on form and fatigue    ^8/2   10/6 Completed with R LE only   Plyo's:      BLE Depth 2x10  S/S 2x10  FWD/REV 2x10            Agility:     Ladder 8'                        Other Therapeutic Activities: Ice 15'    Home Exercise Program: Update as needed. Patient Education:      (1/6): Discussed [employer] program and pt stated goals of getting back into working out and doing classes and being able to be active. Assessment:  [x] Patient tolerated treatment well [] Patient limited by fatigue  [] Patient limited by pain  [] Patient limited by other medical complications  [] Other:     Prognosis: [x] Good [] Fair  [] Poor    Patient Requires Follow-up: [x] Yes  [] No    Plan:   [x] Continue per plan of care [] Alter current plan (see comments)  [] Plan of care initiated [] Hold pending MD visit [] Discharge  Plan for Next Session:  Progress as tolerated. MD Follow-up: 4+ weeks     Electronically signed by:  Mark Lieberman ATC     Tx was performed by TISHA as a part of the [employer] program following PT's recommendations/guidance.        Cosigned by:

## 2022-08-05 ENCOUNTER — HOSPITAL ENCOUNTER (OUTPATIENT)
Dept: PHYSICAL THERAPY | Age: 32
Discharge: HOME | End: 2022-08-05

## 2022-08-05 PROCEDURE — 9990000029 HC GAP PACKAGE

## 2022-08-05 NOTE — FLOWSHEET NOTE
The [address] [place], [place] [address], 800 [address]  Phone: [phone]   Fax:     [phone]      Lower Extremity Daily Performance Training Note  Date: Date:  2022  Patient Name:  Eugenio Payton    :  1990  MRN: [id number]  Restrictions/Precautions:   Medical/Treatment Diagnosis Information:        Diagnosis: G18.297Q (ICD-10-CM) - Rupture of anterior cruciate ligament of right knee, initial encounter  Treatment Diagnosis: M25.561 Right knee pain    Insurance/Certification information:   [facility]    Physician Information:    Referring Practitioner: Sheba Neely MD      Pain level: 0/10     Visit Number: 23 (2 of 7)      Subjective: Pt feeling good. Objective:  Observation:     Functional Testing Results (22)       SL Hop Uninvolved  170cm 161cm 161cm 164cm   SL Hop Uninvolved cm cm cm cm   SL Hop Involved 126cm 131cm 130cm 129cm   SL Hop Involved cm cm cm cm   PERCENT   78.7%      Triple Hop Uninvolved 458cm 466cm 464cm 462. 67cm   Triple Hop Uninvolved cm cm cm cm   Triple Hop Involved 377cm 377cm 371cm 375cm   Triple Hop Involved cm cm cm cm   PERCENT  81.1%      Triple Hop Crossover Uninvolved 429cm 408cm 398cm 411.67cm   Triple Hop Crossover Uninvolved cm cm cm cm   Triple Hop Crossover Involved 320cm 360cm 382cm 354cm   Triple Hop Crossover Involved cm cm cm cm   PERCENT  86%      6 Meter Hop Uninvolved 2.68sec 2.53sec 2.42sec 2.54sec   6 Meter Hop Uninvolved sec sec sec sec   6 Meter Hop Involved 2.67sec 2.56sec 2.56sec 2.60sec   6 Meter Hop Involved sec sec sec sec   PERCENT  97.6%        **Hops not within 10% comparing involved to uninvolved extremity. Continued focus on LE strength of the involved extremity.       Exercises/Interventions:   Exercise/Equipment Resistance/Repetitions Other comments   Stretching/AROM       Bike x5' AROM  7/2 able to complete full revolutions    AlterG (100%) 10' (1' walk, 1'jog) 5/9        Calf stretch 30\"hx3 R ^6/15 slant board with protected weight bearing, bilateral axillary crutches. Hamstring  30\"hx3 R 5/25 Seated EOT, 1/2 roll under ankle    Prone quad strertch 30\"hx3 R 7/30   ITB stretch 30\"hx3 R 12/8 reintroduced d/t symptoms and pt not being compliant with stretch for HEP   Strengthening       SLR abd with hip flex/ext 5# 3x10 R 12/8 side lying    SLR + 30\"hx5 R 11/11 5#   SL bridge        3x threat 3x10 R        3x10 12/8 with BS to maintain level hips   Clams Gray loop 10\"hx10 R ^12/8   Neuromuscular re-ed       SL squat at table 3x10 R 12/8 bilat UE support, decrease depth to 45 deg knee flexion   SL closed chain hamstring Green TB 3x10 R ^12/8   SLS with hip abd 3x10  12/8 withheld band to focus on proper form, completed on airex   Lunges  3x10 30#    BOSU Squats  3x10 gray               Quantum machines       Leg press  180# 5x5 SL R 10 holes show at bottom      220# 3x10 eccentric lower on R   ^ 12/13   Leg extension Warm up 20# 10x  40# 3x10 SL R (D,4, 1/2 hole)   50# 3x10 ECC      Leg curl 50# 5x5 SL R  See 1/2 hole on seat 11/30 adjusted weight based on form and fatigue    ^8/2   10/6 Completed with R LE only   Plyo's:      BLE Depth 2x10  S/S 2x10  FWD/REV 2x10            Agility:     Ladder 8'                        Other Therapeutic Activities: Ice 15'    Home Exercise Program: Update as needed. Patient Education:      (1/6): Discussed [employer] program and pt stated goals of getting back into working out and doing classes and being able to be active.      Assessment:  [x] Patient tolerated treatment well [] Patient limited by fatigue  [] Patient limited by pain  [] Patient limited by other medical complications  [] Other:     Prognosis: [x] Good [] Fair  [] Poor    Patient Requires Follow-up: [x] Yes  [] No    Plan:   [x] Continue per plan of care [] Alter current plan (see comments)  [] Plan of care initiated [] Hold pending MD visit [] Discharge  Plan for Next Session:  Progress as tolerated. MD Follow-up: 4+ weeks     Electronically signed by:  Fatimah Persaud ATC     Tx was performed by TISHA as a part of the [facility] program following PT's recommendations/guidance.        Cosigned by:

## 2022-08-16 ENCOUNTER — HOSPITAL ENCOUNTER (OUTPATIENT)
Dept: PHYSICAL THERAPY | Age: 32
Discharge: HOME | End: 2022-08-16

## 2022-08-16 NOTE — FLOWSHEET NOTE
The [address] [place], [place] [address], [address]  Phone: [phone]   Fax:     [phone]      Lower Extremity Daily Performance Training Note  Date: Date:  2022  Patient Name:  Libby Martinez    :  1990  MRN: [id number]  Restrictions/Precautions:   Medical/Treatment Diagnosis Information:        Diagnosis: G85.715J (ICD-10-CM) - Rupture of anterior cruciate ligament of right knee, initial encounter  Treatment Diagnosis: M25.561 Right knee pain    Insurance/Certification information:   [facility]    Physician Information:    Referring Practitioner: Tierra Francis MD      Pain level: 0/10     Visit Number: 24 (3 of 7)     Subjective: Pt feeling good. Objective:  Observation:     Functional Testing Results (22)       SL Hop Uninvolved  170cm 161cm 161cm 164cm   SL Hop Uninvolved cm cm cm cm   SL Hop Involved 126cm 131cm 130cm 129cm   SL Hop Involved cm cm cm cm   PERCENT   78.7%      Triple Hop Uninvolved 458cm 466cm 464cm 462. 67cm   Triple Hop Uninvolved cm cm cm cm   Triple Hop Involved 377cm 377cm 371cm 375cm   Triple Hop Involved cm cm cm cm   PERCENT  81.1%      Triple Hop Crossover Uninvolved 429cm 408cm 398cm 411.67cm   Triple Hop Crossover Uninvolved cm cm cm cm   Triple Hop Crossover Involved 320cm 360cm 382cm 354cm   Triple Hop Crossover Involved cm cm cm cm   PERCENT  86%      6 Meter Hop Uninvolved 2.68sec 2.53sec 2.42sec 2.54sec   6 Meter Hop Uninvolved sec sec sec sec   6 Meter Hop Involved 2.67sec 2.56sec 2.56sec 2.60sec   6 Meter Hop Involved sec sec sec sec   PERCENT  97.6%        **Hops not within 10% comparing involved to uninvolved extremity. Continued focus on LE strength of the involved extremity.       Exercises/Interventions:   Exercise/Equipment Resistance/Repetitions Other comments   Stretching/AROM       Bike x5' AROM  7/2 able to complete full revolutions    AlterG (100%) 10' (1' for Next Session:  Progress as tolerated. MD Follow-up: 4+ weeks     Electronically signed by:  Monty Bates ATC     Tx was performed by TISHA as a part of the [facility] program following PT's recommendations/guidance.        Cosigned by:

## 2022-08-25 ENCOUNTER — HOSPITAL ENCOUNTER (OUTPATIENT)
Dept: PHYSICAL THERAPY | Age: 32
Discharge: HOME | End: 2022-08-25

## 2022-08-25 NOTE — FLOWSHEET NOTE
The [address] [place] [address],[address], [address]  Phone: [phone]   Fax:     [phone]      Lower Extremity Daily Performance Training Note  Date: Date:  2022  Patient Name:  Libby Martinez    :  1990  MRN: [id number]  Restrictions/Precautions:   Medical/Treatment Diagnosis Information:        Diagnosis: Q84.387H (ICD-10-CM) - Rupture of anterior cruciate ligament of right knee, initial encounter  Treatment Diagnosis: M25.561 Right knee pain    Insurance/Certification information:   [facility]    Physician Information:    Referring Practitioner: Tierra Francis MD      Pain level: 0/10     Visit Number: 25 (4 of 7)     Subjective: Pt feeling good. Objective:  Observation:     Functional Testing Results (22)       SL Hop Uninvolved  170cm 161cm 161cm 164cm   SL Hop Uninvolved cm cm cm cm   SL Hop Involved 126cm 131cm 130cm 129cm   SL Hop Involved cm cm cm cm   PERCENT   78.7%      Triple Hop Uninvolved 458cm 466cm 464cm 462. 67cm   Triple Hop Uninvolved cm cm cm cm   Triple Hop Involved 377cm 377cm 371cm 375cm   Triple Hop Involved cm cm cm cm   PERCENT  81.1%      Triple Hop Crossover Uninvolved 429cm 408cm 398cm 411.67cm   Triple Hop Crossover Uninvolved cm cm cm cm   Triple Hop Crossover Involved 320cm 360cm 382cm 354cm   Triple Hop Crossover Involved cm cm cm cm   PERCENT  86%      6 Meter Hop Uninvolved 2.68sec 2.53sec 2.42sec 2.54sec   6 Meter Hop Uninvolved sec sec sec sec   6 Meter Hop Involved 2.67sec 2.56sec 2.56sec 2.60sec   6 Meter Hop Involved sec sec sec sec   PERCENT  97.6%        **Hops not within 10% comparing involved to uninvolved extremity. Continued focus on LE strength of the involved extremity.       Exercises/Interventions:   Exercise/Equipment Resistance/Repetitions Other comments   Stretching/AROM       Bike x5' AROM  7/2 able to complete full revolutions    AlterG (100%) 10' (1' walk, 1'jog) 5/9        Calf stretch 30\"hx3 R ^6/15 slant board with protected weight bearing, bilateral axillary crutches. Hamstring  30\"hx3 R 5/25 Seated EOT, 1/2 roll under ankle    Prone quad strertch 30\"hx3 R 7/30   ITB stretch 30\"hx3 R 12/8 reintroduced d/t symptoms and pt not being compliant with stretch for HEP   Strengthening       SLR abd with hip flex/ext 5# 3x10 R 12/8 side lying    SLR + 30\"hx5 R 11/11 5#   SL bridge        3x threat 3x10 R        3x10 12/8 with BS to maintain level hips   Clams Gray loop 10\"hx10 R ^12/8   Neuromuscular re-ed       SL squat at table 3x10 R 12/8 bilat UE support, decrease depth to 45 deg knee flexion   SL closed chain hamstring Green TB 3x10 R ^12/8   SLS with hip abd 3x10  12/8 withheld band to focus on proper form, completed on airex   Lunges  3x10 30#    BOSU Squats  3x10 gray               Quantum machines       Leg press  180# 5x5 SL R 10 holes show at bottom      220# 3x10 eccentric lower on R   ^ 12/13   Leg extension Warm up 20# 10x  40# 3x10 SL R (D,4, 1/2 hole)   50# 3x10 ECC      Leg curl 50# 5x5 SL R  See 1/2 hole on seat 11/30 adjusted weight based on form and fatigue    ^8/2   10/6 Completed with R LE only   Plyo's:      BLE Depth 2x10  S/S 2x10  FWD/REV 2x10            Agility:     Ladder 8'                        Other Therapeutic Activities: Ice 15'    Home Exercise Program: Update as needed. Patient Education:      (1/6): Discussed [employer] program and pt stated goals of getting back into working out and doing classes and being able to be active.      Assessment:  [x] Patient tolerated treatment well [] Patient limited by fatigue  [] Patient limited by pain  [] Patient limited by other medical complications  [] Other:     Prognosis: [x] Good [] Fair  [] Poor    Patient Requires Follow-up: [x] Yes  [] No    Plan:   [x] Continue per plan of care [] Alter current plan (see comments)  [] Plan of care initiated [] Hold pending MD visit [] Discharge  Plan for Next Session:  Progress as tolerated. MD Follow-up: 4+ weeks     Electronically signed by:  Monty Bates ATC     Tx was performed by TISHA as a part of the [facility] program following PT's recommendations/guidance.        Cosigned by:

## 2022-08-29 ENCOUNTER — HOSPITAL ENCOUNTER (OUTPATIENT)
Dept: PHYSICAL THERAPY | Age: 32
Discharge: HOME | End: 2022-08-29

## 2022-08-29 NOTE — FLOWSHEET NOTE
The [address] [place] [address],[address], [address]  Phone: [phone]   Fax:     [phone]      Lower Extremity Daily Performance Training Note  Date: Date:  2022  Patient Name:  Yary Hyde    :  1990  MRN: [id number]  Restrictions/Precautions:   Medical/Treatment Diagnosis Information:        Diagnosis: E38.701P (ICD-10-CM) - Rupture of anterior cruciate ligament of right knee, initial encounter  Treatment Diagnosis: M25.561 Right knee pain    Insurance/Certification information:   [facility]    Physician Information:    Referring Practitioner: Heather Baeza MD      Pain level: 0/10     Visit Number: 26 (5 of 7)     Subjective: Pt feeling good. Objective:  Observation:     Functional Testing Results (22)       SL Hop Uninvolved  170cm 161cm 161cm 164cm   SL Hop Uninvolved cm cm cm cm   SL Hop Involved 126cm 131cm 130cm 129cm   SL Hop Involved cm cm cm cm   PERCENT   78.7%      Triple Hop Uninvolved 458cm 466cm 464cm 462. 67cm   Triple Hop Uninvolved cm cm cm cm   Triple Hop Involved 377cm 377cm 371cm 375cm   Triple Hop Involved cm cm cm cm   PERCENT  81.1%      Triple Hop Crossover Uninvolved 429cm 408cm 398cm 411.67cm   Triple Hop Crossover Uninvolved cm cm cm cm   Triple Hop Crossover Involved 320cm 360cm 382cm 354cm   Triple Hop Crossover Involved cm cm cm cm   PERCENT  86%      6 Meter Hop Uninvolved 2.68sec 2.53sec 2.42sec 2.54sec   6 Meter Hop Uninvolved sec sec sec sec   6 Meter Hop Involved 2.67sec 2.56sec 2.56sec 2.60sec   6 Meter Hop Involved sec sec sec sec   PERCENT  97.6%        **Hops not within 10% comparing involved to uninvolved extremity. Continued focus on LE strength of the involved extremity.       Exercises/Interventions:   Exercise/Equipment Resistance/Repetitions Other comments   Stretching/AROM       Bike x5' AROM  7/2 able to complete full revolutions    AlterG (100%) 10' (1' walk, 1'jog) 5/9        Calf stretch 30\"hx3 R ^6/15 slant board with protected weight bearing, bilateral axillary crutches. Hamstring  30\"hx3 R 5/25 Seated EOT, 1/2 roll under ankle    Prone quad strertch 30\"hx3 R 7/30   ITB stretch 30\"hx3 R 12/8 reintroduced d/t symptoms and pt not being compliant with stretch for HEP   Strengthening       SLR abd with hip flex/ext 5# 3x10 R 12/8 side lying    SLR + 30\"hx5 R 11/11 5#   SL bridge        3x threat 3x10 R        3x10 12/8 with BS to maintain level hips   Clams Gray loop 10\"hx10 R ^12/8   Neuromuscular re-ed       SL squat at table 3x10 R 12/8 bilat UE support, decrease depth to 45 deg knee flexion   SL closed chain hamstring Green TB 3x10 R ^12/8   SLS with hip abd 3x10  12/8 withheld band to focus on proper form, completed on airex   Lunges  3x10 30#    BOSU Squats  3x10 gray               Quantum machines       Leg press  180# 5x5 SL R 10 holes show at bottom      220# 3x10 eccentric lower on R   ^ 12/13   Leg extension Warm up 20# 10x  40# 3x10 SL R (D,4, 1/2 hole)   50# 3x10 ECC      Leg curl 50# 5x5 SL R  See 1/2 hole on seat 11/30 adjusted weight based on form and fatigue    ^8/2   10/6 Completed with R LE only   Plyo's:      BLE Depth 2x10  S/S 2x10  FWD/REV 2x10            Agility:     Ladder 8'                        Other Therapeutic Activities: Ice 15'    Home Exercise Program: Update as needed. Patient Education:      (1/6): Discussed [employer] program and pt stated goals of getting back into working out and doing classes and being able to be active.      Assessment:  [x] Patient tolerated treatment well [] Patient limited by fatigue  [] Patient limited by pain  [] Patient limited by other medical complications  [] Other:     Prognosis: [x] Good [] Fair  [] Poor    Patient Requires Follow-up: [x] Yes  [] No    Plan:   [x] Continue per plan of care [] Alter current plan (see comments)  [] Plan of care initiated [] Hold pending MD visit [] Discharge  Plan for Next Session:  Progress as tolerated. MD Follow-up: 4+ weeks     Electronically signed by:  Yanet Jasmine ATC     Tx was performed by ATC as a part of the [facility] program following PT's recommendations/guidance.        Cosigned by:

## 2022-09-07 ENCOUNTER — HOSPITAL ENCOUNTER (OUTPATIENT)
Dept: PHYSICAL THERAPY | Age: 32
Discharge: HOME | End: 2022-09-07

## 2022-09-07 NOTE — FLOWSHEET NOTE
The [address] [place] [address],[address], [address]  Phone: [phone]   Fax:     [phone]      Lower Extremity Daily Performance Training Note  Date: Date:  2022  Patient Name:  Althia Burkitt    :  1990  MRN: [id number]  Restrictions/Precautions:   Medical/Treatment Diagnosis Information:        Diagnosis: I11.031T (ICD-10-CM) - Rupture of anterior cruciate ligament of right knee, initial encounter  Treatment Diagnosis: M25.561 Right knee pain    Insurance/Certification information:   [facility]    Physician Information:    Referring Practitioner: Yuly Mcwilliams MD      Pain level: 0/10     Visit Number: 27 (6 of 7)     Subjective: Pt feeling good. Objective:  Observation:     Functional Testing Results (22)       SL Hop Uninvolved  170cm 161cm 161cm 164cm   SL Hop Uninvolved cm cm cm cm   SL Hop Involved 126cm 131cm 130cm 129cm   SL Hop Involved cm cm cm cm   PERCENT   78.7%      Triple Hop Uninvolved 458cm 466cm 464cm 462. 67cm   Triple Hop Uninvolved cm cm cm cm   Triple Hop Involved 377cm 377cm 371cm 375cm   Triple Hop Involved cm cm cm cm   PERCENT  81.1%      Triple Hop Crossover Uninvolved 429cm 408cm 398cm 411.67cm   Triple Hop Crossover Uninvolved cm cm cm cm   Triple Hop Crossover Involved 320cm 360cm 382cm 354cm   Triple Hop Crossover Involved cm cm cm cm   PERCENT  86%      6 Meter Hop Uninvolved 2.68sec 2.53sec 2.42sec 2.54sec   6 Meter Hop Uninvolved sec sec sec sec   6 Meter Hop Involved 2.67sec 2.56sec 2.56sec 2.60sec   6 Meter Hop Involved sec sec sec sec   PERCENT  97.6%        **Hops not within 10% comparing involved to uninvolved extremity. Continued focus on LE strength of the involved extremity.       Exercises/Interventions:   Exercise/Equipment Resistance/Repetitions Other comments   Stretching/AROM       Bike x5' AROM  7/2 able to complete full revolutions    AlterG (100%) 10' (1' walk, 1'jog) 5/9        Calf stretch 30\"hx3 R ^6/15 slant board with protected weight bearing, bilateral axillary crutches. Hamstring  30\"hx3 R 5/25 Seated EOT, 1/2 roll under ankle    Prone quad strertch 30\"hx3 R 7/30   ITB stretch 30\"hx3 R 12/8 reintroduced d/t symptoms and pt not being compliant with stretch for HEP   Strengthening       SLR abd with hip flex/ext 5# 3x10 R 12/8 side lying    SLR + 30\"hx5 R 11/11 5#   SL bridge        3x threat 3x10 R        3x10 12/8 with BS to maintain level hips   Clams Gray loop 10\"hx10 R ^12/8   Neuromuscular re-ed       SL squat at table 3x10 R 12/8 bilat UE support, decrease depth to 45 deg knee flexion   SL closed chain hamstring Green TB 3x10 R ^12/8   SLS with hip abd 3x10  12/8 withheld band to focus on proper form, completed on airex   Lunges  3x10 30#    BOSU Squats  3x10 gray               Quantum machines       Leg press  180# 5x5 SL R 10 holes show at bottom      220# 3x10 eccentric lower on R   ^ 12/13   Leg extension Warm up 20# 10x  40# 3x10 SL R (D,4, 1/2 hole)   50# 3x10 ECC      Leg curl 50# 5x5 SL R  See 1/2 hole on seat 11/30 adjusted weight based on form and fatigue    ^8/2   10/6 Completed with R LE only   Plyo's:      BLE Depth 2x10  S/S 2x10  FWD/REV 2x10            Agility:     Ladder 8'                        Other Therapeutic Activities: Ice 15'    Home Exercise Program: Update as needed. Patient Education:      (1/6): Discussed [employer] program and pt stated goals of getting back into working out and doing classes and being able to be active.      Assessment:  [x] Patient tolerated treatment well [] Patient limited by fatigue  [] Patient limited by pain  [] Patient limited by other medical complications  [] Other:     Prognosis: [x] Good [] Fair  [] Poor    Patient Requires Follow-up: [x] Yes  [] No    Plan:   [x] Continue per plan of care [] Alter current plan (see comments)  [] Plan of care initiated [] Hold pending MD visit [] Discharge  Plan for Next Session:  Progress as tolerated. MD Follow-up: 4+ weeks     Electronically signed by:  Myah Baker ATC     Tx was performed by TISHA as a part of the [employer] program following PT's recommendations/guidance.        Cosigned by:

## 2022-09-13 ENCOUNTER — HOSPITAL ENCOUNTER (OUTPATIENT)
Dept: PHYSICAL THERAPY | Age: 32
Discharge: HOME | End: 2022-09-13

## 2022-09-20 ENCOUNTER — HOSPITAL ENCOUNTER (OUTPATIENT)
Dept: PHYSICAL THERAPY | Age: 32
Discharge: HOME | End: 2022-09-20

## 2022-09-20 PROCEDURE — 9990000029 HC GAP PACKAGE

## 2022-09-20 NOTE — FLOWSHEET NOTE
The [address] [place] [address],[address], [address]  Phone: [phone]   Fax:     [phone]      Lower Extremity Daily Performance Training Note  Date: Date:  2022  Patient Name:  Omar Hoffmann    :  1990  MRN: [id number]  Restrictions/Precautions:   Medical/Treatment Diagnosis Information:        Diagnosis: F14.289Q (ICD-10-CM) - Rupture of anterior cruciate ligament of right knee, initial encounter  Treatment Diagnosis: M25.561 Right knee pain    Insurance/Certification information:   [facility]    Physician Information:    Referring Practitioner: Enmanuel Iglesias MD      Pain level: 0/10     Visit Number: 28 (7 of 7)     Subjective: Pt feeling good. Objective:  Observation:     Functional Testing Results (22)       SL Hop Uninvolved  170cm 161cm 161cm 164cm   SL Hop Uninvolved cm cm cm cm   SL Hop Involved 126cm 131cm 130cm 129cm   SL Hop Involved cm cm cm cm   PERCENT   78.7%      Triple Hop Uninvolved 458cm 466cm 464cm 462. 67cm   Triple Hop Uninvolved cm cm cm cm   Triple Hop Involved 377cm 377cm 371cm 375cm   Triple Hop Involved cm cm cm cm   PERCENT  81.1%      Triple Hop Crossover Uninvolved 429cm 408cm 398cm 411.67cm   Triple Hop Crossover Uninvolved cm cm cm cm   Triple Hop Crossover Involved 320cm 360cm 382cm 354cm   Triple Hop Crossover Involved cm cm cm cm   PERCENT  86%      6 Meter Hop Uninvolved 2.68sec 2.53sec 2.42sec 2.54sec   6 Meter Hop Uninvolved sec sec sec sec   6 Meter Hop Involved 2.67sec 2.56sec 2.56sec 2.60sec   6 Meter Hop Involved sec sec sec sec   PERCENT  97.6%        **Hops not within 10% comparing involved to uninvolved extremity. Continued focus on LE strength of the involved extremity.       Exercises/Interventions:   Exercise/Equipment Resistance/Repetitions Other comments   Stretching/AROM       Bike x5' AROM  7/2 able to complete full revolutions    AlterG (100%) 10' (1' walk, 1'jog) 5/9        Calf stretch 30\"hx3 R ^6/15 slant board with protected weight bearing, bilateral axillary crutches. Hamstring  30\"hx3 R 5/25 Seated EOT, 1/2 roll under ankle    Prone quad strertch 30\"hx3 R 7/30   ITB stretch 30\"hx3 R 12/8 reintroduced d/t symptoms and pt not being compliant with stretch for HEP   Strengthening       SLR abd with hip flex/ext 5# 3x10 R 12/8 side lying    SLR + 30\"hx5 R 11/11 5#   SL bridge        3x threat 3x10 R        3x10 12/8 with BS to maintain level hips   Clams Gray loop 10\"hx10 R ^12/8   Neuromuscular re-ed       SL squat at table 3x10 R 12/8 bilat UE support, decrease depth to 45 deg knee flexion   SL closed chain hamstring Green TB 3x10 R ^12/8   SLS with hip abd 3x10  12/8 withheld band to focus on proper form, completed on airex   Lunges  3x10 30#    BOSU Squats  3x10 gray               Quantum machines       Leg press  180# 5x5 SL R 10 holes show at bottom      220# 3x10 eccentric lower on R   ^ 12/13   Leg extension Warm up 20# 10x  40# 3x10 SL R (D,4, 1/2 hole)   50# 3x10 ECC      Leg curl 50# 5x5 SL R  See 1/2 hole on seat 11/30 adjusted weight based on form and fatigue    ^8/2   10/6 Completed with R LE only   Plyo's:      BLE Depth 2x10  S/S 2x10  FWD/REV 2x10            Agility:     Ladder 8'                        Other Therapeutic Activities: Ice 15'    Home Exercise Program: Update as needed. Patient Education:      (1/6): Discussed [employer] program and pt stated goals of getting back into working out and doing classes and being able to be active.      Assessment:  [x] Patient tolerated treatment well [] Patient limited by fatigue  [] Patient limited by pain  [] Patient limited by other medical complications  [] Other:     Prognosis: [x] Good [] Fair  [] Poor    Patient Requires Follow-up: [x] Yes  [] No    Plan:   [x] Continue per plan of care [] Alter current plan (see comments)  [] Plan of care initiated [] Hold pending MD visit [] Discharge  Plan for Next Session:  Progress as tolerated. MD Follow-up: 4+ weeks     Electronically signed by:  April Larson ATC     Tx was performed by ATC as a part of the [employer] program following PT's recommendations/guidance.        Cosigned by:

## 2022-09-20 NOTE — FLOWSHEET NOTE
The [address] [place] [address],[address], [address]  Phone: [phone]   Fax:     [phone]      Lower Extremity Daily Performance Training Note  Date: Date:  2022  Patient Name:  Twyla Oseguera    :  1990  MRN: [id number]  Restrictions/Precautions:   Medical/Treatment Diagnosis Information:        Diagnosis: A93.453G (ICD-10-CM) - Rupture of anterior cruciate ligament of right knee, initial encounter  Treatment Diagnosis: M25.561 Right knee pain    Insurance/Certification information:   [facility]    Physician Information:    Referring Practitioner: Queenie Knight MD      Pain level: 0/10     Visit Number: 29 (1 of 7)     Subjective: Pt feeling good. Objective:  Observation:     Functional Testing Results (22)       SL Hop Uninvolved  170cm 161cm 161cm 164cm   SL Hop Uninvolved cm cm cm cm   SL Hop Involved 126cm 131cm 130cm 129cm   SL Hop Involved cm cm cm cm   PERCENT   78.7%      Triple Hop Uninvolved 458cm 466cm 464cm 462. 67cm   Triple Hop Uninvolved cm cm cm cm   Triple Hop Involved 377cm 377cm 371cm 375cm   Triple Hop Involved cm cm cm cm   PERCENT  81.1%      Triple Hop Crossover Uninvolved 429cm 408cm 398cm 411.67cm   Triple Hop Crossover Uninvolved cm cm cm cm   Triple Hop Crossover Involved 320cm 360cm 382cm 354cm   Triple Hop Crossover Involved cm cm cm cm   PERCENT  86%      6 Meter Hop Uninvolved 2.68sec 2.53sec 2.42sec 2.54sec   6 Meter Hop Uninvolved sec sec sec sec   6 Meter Hop Involved 2.67sec 2.56sec 2.56sec 2.60sec   6 Meter Hop Involved sec sec sec sec   PERCENT  97.6%        **Hops not within 10% comparing involved to uninvolved extremity. Continued focus on LE strength of the involved extremity.       Exercises/Interventions:   Exercise/Equipment Resistance/Repetitions Other comments   Stretching/AROM       Bike x5' AROM  7/2 able to complete full revolutions    AlterG (100%) 10' (1' walk, 1'jog) 5/9        Calf stretch 30\"hx3 R ^6/15 slant board with protected weight bearing, bilateral axillary crutches. Hamstring  30\"hx3 R 5/25 Seated EOT, 1/2 roll under ankle    Prone quad strertch 30\"hx3 R 7/30   ITB stretch 30\"hx3 R 12/8 reintroduced d/t symptoms and pt not being compliant with stretch for HEP   Strengthening       SLR abd with hip flex/ext 5# 3x10 R 12/8 side lying    SLR + 30\"hx5 R 11/11 5#   SL bridge        3x threat 3x10 R        3x10 12/8 with BS to maintain level hips   Clams Gray loop 10\"hx10 R ^12/8   Neuromuscular re-ed       SL squat at table 3x10 R 12/8 bilat UE support, decrease depth to 45 deg knee flexion   SL closed chain hamstring Green TB 3x10 R ^12/8   SLS with hip abd 3x10  12/8 withheld band to focus on proper form, completed on airex   Lunges  3x10 30#    BOSU Squats  3x10 gray               Quantum machines       Leg press  180# 5x5 SL R 10 holes show at bottom      220# 3x10 eccentric lower on R   ^ 12/13   Leg extension Warm up 20# 10x  40# 3x10 SL R (D,4, 1/2 hole)   50# 3x10 ECC      Leg curl 50# 5x5 SL R  See 1/2 hole on seat 11/30 adjusted weight based on form and fatigue    ^8/2   10/6 Completed with R LE only   Plyo's:      BLE Depth 2x10  S/S 2x10  FWD/REV 2x10            Agility:     Ladder 8'                        Other Therapeutic Activities: Ice 15'    Home Exercise Program: Update as needed. Patient Education:      (1/6): Discussed [employer] program and pt stated goals of getting back into working out and doing classes and being able to be active.      Assessment:  [x] Patient tolerated treatment well [] Patient limited by fatigue  [] Patient limited by pain  [] Patient limited by other medical complications  [] Other:     Prognosis: [x] Good [] Fair  [] Poor    Patient Requires Follow-up: [x] Yes  [] No    Plan:   [x] Continue per plan of care [] Alter current plan (see comments)  [] Plan of care initiated [] Hold pending MD visit [] Discharge  Plan for Next Session:  Progress as tolerated. MD Follow-up: 4+ weeks     Electronically signed by:  Yohana Blankenship ATC     Tx was performed by TISHA as a part of the [facility] program following PT's recommendations/guidance.        Cosigned by:

## 2022-09-26 ENCOUNTER — HOSPITAL ENCOUNTER (OUTPATIENT)
Dept: PHYSICAL THERAPY | Age: 32
Discharge: HOME | End: 2022-09-26

## 2022-10-11 ENCOUNTER — HOSPITAL ENCOUNTER (OUTPATIENT)
Dept: PHYSICAL THERAPY | Age: 32
Discharge: HOME | End: 2022-10-11

## 2022-10-11 NOTE — FLOWSHEET NOTE
The [address] [place], [place] [address], [address], [address] [place] [address]  Phone: [phone]   Fax:     [phone]      Lower Extremity Daily Performance Training Note  Date: Date:  2022  Patient Name:  Yun Ceja    :  1990  MRN: [id number]  Restrictions/Precautions:   Medical/Treatment Diagnosis Information:        Diagnosis: G63.688T (ICD-10-CM) - Rupture of anterior cruciate ligament of right knee, initial encounter  Treatment Diagnosis: M25.561 Right knee pain    Insurance/Certification information:   [facility]    Physician Information:    Referring Practitioner: Julissa Nugent MD      Pain level: 0/10     Visit Number: 29 (1 of 7)     Subjective: Pt feeling good. Objective:  Observation:     Functional Testing Results (22)       SL Hop Uninvolved  170cm 161cm 161cm 164cm   SL Hop Uninvolved cm cm cm cm   SL Hop Involved 126cm 131cm 130cm 129cm   SL Hop Involved cm cm cm cm   PERCENT   78.7%      Triple Hop Uninvolved 458cm 466cm 464cm 462. 67cm   Triple Hop Uninvolved cm cm cm cm   Triple Hop Involved 377cm 377cm 371cm 375cm   Triple Hop Involved cm cm cm cm   PERCENT  81.1%      Triple Hop Crossover Uninvolved 429cm 408cm 398cm 411.67cm   Triple Hop Crossover Uninvolved cm cm cm cm   Triple Hop Crossover Involved 320cm 360cm 382cm 354cm   Triple Hop Crossover Involved cm cm cm cm   PERCENT  86%      6 Meter Hop Uninvolved 2.68sec 2.53sec 2.42sec 2.54sec   6 Meter Hop Uninvolved sec sec sec sec   6 Meter Hop Involved 2.67sec 2.56sec 2.56sec 2.60sec   6 Meter Hop Involved sec sec sec sec   PERCENT  97.6%        **Hops not within 10% comparing involved to uninvolved extremity. Continued focus on LE strength of the involved extremity.       Exercises/Interventions:   Exercise/Equipment Resistance/Repetitions Other comments   Stretching/AROM       Bike x5' AROM  7/2 able to complete full revolutions    AlterG (100%) 10' (1' walk, 1'jog) 5/9        Calf stretch 30\"hx3 R ^6/15 slant board with protected weight bearing, bilateral axillary crutches. Hamstring  30\"hx3 R 5/25 Seated EOT, 1/2 roll under ankle    Prone quad strertch 30\"hx3 R 7/30   ITB stretch 30\"hx3 R 12/8 reintroduced d/t symptoms and pt not being compliant with stretch for HEP   Strengthening       SLR abd with hip flex/ext 5# 3x10 R 12/8 side lying    SLR + 30\"hx5 R 11/11 5#   SL bridge        3x threat 3x10 R        3x10 12/8 with BS to maintain level hips   Clams Gray loop 10\"hx10 R ^12/8   Neuromuscular re-ed       SL squat at table 3x10 R 12/8 bilat UE support, decrease depth to 45 deg knee flexion   SL closed chain hamstring Green TB 3x10 R ^12/8   SLS with hip abd 3x10  12/8 withheld band to focus on proper form, completed on airex   Lunges  3x10 30#    BOSU Squats  3x10 gray               Quantum machines       Leg press  180# 5x5 SL R 10 holes show at bottom      220# 3x10 eccentric lower on R   ^ 12/13   Leg extension Warm up 20# 10x  40# 3x10 SL R (D,4, 1/2 hole)   50# 3x10 ECC      Leg curl 50# 5x5 SL R  See 1/2 hole on seat 11/30 adjusted weight based on form and fatigue    ^8/2   10/6 Completed with R LE only   Plyo's:      BLE Depth 2x10  S/S 2x10  FWD/REV 2x10            Agility:     Ladder 8'                        Other Therapeutic Activities: Ice 15'    Home Exercise Program: Update as needed. Patient Education:      (1/6): Discussed [employer] program and pt stated goals of getting back into working out and doing classes and being able to be active.      Assessment:  [x] Patient tolerated treatment well [] Patient limited by fatigue  [] Patient limited by pain  [] Patient limited by other medical complications  [] Other:     Prognosis: [x] Good [] Fair  [] Poor    Patient Requires Follow-up: [x] Yes  [] No    Plan:   [x] Continue per plan of care [] Alter current plan (see comments)  [] Plan of care initiated [] Hold pending MD visit [] Discharge  Plan for Next Session:  Progress as tolerated. MD Follow-up: 4+ weeks     Electronically signed by:  Jaxson Boudreaux ATC     Tx was performed by TISHA as a part of the [facility] program following PT's recommendations/guidance.        Cosigned by:

## 2022-10-28 ENCOUNTER — HOSPITAL ENCOUNTER (OUTPATIENT)
Dept: PHYSICAL THERAPY | Age: 32
Discharge: HOME | End: 2022-10-28

## 2022-10-28 NOTE — FLOWSHEET NOTE
walk, 1'jog) 5/9        Calf stretch 30\"hx3 R ^6/15 slant board with protected weight bearing, bilateral axillary crutches. Hamstring  30\"hx3 R 5/25 Seated EOT, 1/2 roll under ankle    Prone quad strertch 30\"hx3 R 7/30   ITB stretch 30\"hx3 R 12/8 reintroduced d/t symptoms and pt not being compliant with stretch for HEP   Strengthening       SLR abd with hip flex/ext 5# 3x10 R 12/8 side lying    SLR + 30\"hx5 R 11/11 5#   SL bridge        3x threat 3x10 R        3x10 12/8 with BS to maintain level hips   Clams Gray loop 10\"hx10 R ^12/8   Neuromuscular re-ed       SL squat at table 3x10 R 12/8 bilat UE support, decrease depth to 45 deg knee flexion   SL closed chain hamstring Green TB 3x10 R ^12/8   SLS with hip abd 3x10  12/8 withheld band to focus on proper form, completed on airex   Lunges  3x10 30#    BOSU Squats  3x10 gray               Quantum machines       Leg press  180# 5x5 SL R 10 holes show at bottom      220# 3x10 eccentric lower on R   ^ 12/13   Leg extension Warm up 20# 10x  40# 3x10 SL R (D,4, 1/2 hole)   50# 3x10 ECC      Leg curl 50# 5x5 SL R  See 1/2 hole on seat 11/30 adjusted weight based on form and fatigue    ^8/2   10/6 Completed with R LE only   Plyo's:      BLE Depth 2x10  S/S 2x10  FWD/REV 2x10            Agility:     Ladder 8'                        Other Therapeutic Activities: Ice 15'    Home Exercise Program: Update as needed. Patient Education:      (1/6): Discussed [employer] program and pt stated goals of getting back into working out and doing classes and being able to be active.      Assessment:  [x] Patient tolerated treatment well [] Patient limited by fatigue  [] Patient limited by pain  [] Patient limited by other medical complications  [] Other:     Prognosis: [x] Good [] Fair  [] Poor    Patient Requires Follow-up: [x] Yes  [] No    Plan:   [x] Continue per plan of care [] Alter current plan (see comments)  [] Plan of care initiated [] Hold pending MD visit [] Discharge  Plan for Next Session:  Progress as tolerated. MD Follow-up: 4+ weeks     Electronically signed by:  Juno uJarez ATC     Tx was performed by TISHA as a part of the [facility] [place] program following PT's recommendations/guidance.        Cosigned by:

## 2022-11-08 ENCOUNTER — HOSPITAL ENCOUNTER (OUTPATIENT)
Dept: PHYSICAL THERAPY | Age: 32
Discharge: HOME | End: 2022-11-08

## 2022-11-08 NOTE — FLOWSHEET NOTE
[facility], [address] [place] [address], [address] [place] [address]  Phone: [phone]   Fax:     [phone]      Lower Extremity Daily Performance Training Note  Date:  2022   Patient Name:  Jaye Min    :  1990  MRN: [id number]  Restrictions/Precautions:   Medical/Treatment Diagnosis Information:        Diagnosis: R60.222F (ICD-10-CM) - Rupture of anterior cruciate ligament of right knee, initial encounter  Treatment Diagnosis: M25.561 Right knee pain    Insurance/Certification information:   [facility]    Physician Information:    Referring Practitioner: Noemi Li MD      Pain level: 0/10     Visit Number: 36 (1 of 7)     Subjective: Pt feeling good.       Objective:  Observation:   Functional Testing Results       SL Hop Uninvolved  167cm 164cm 165cm cm   SL Hop Uninvolved cm cm cm cm   SL Hop Involved 163cm 158cm 166cm cm   SL Hop Involved cm cm cm cm   PERCENT        Triple Hop Uninvolved 458cm 465cm 466cm cm   Triple Hop Uninvolved cm cm cm cm   Triple Hop Involved 452cm 456cm 468cm cm   Triple Hop Involved cm cm cm cm   PERCENT       Triple Hop Crossover Uninvolved 394cm 448cm 410cm cm   Triple Hop Crossover Uninvolved cm cm cm cm   Triple Hop Crossover Involved 437cm 433cm 419cm cm   Triple Hop Crossover Involved cm cm cm cm   PERCENT       6 Meter Hop Uninvolved 2.15sec 2.24sec 2.19sec sec   6 Meter Hop Uninvolved sec sec sec sec   6 Meter Hop Involved 2.19sec 2.23sec 2.24sec sec   6 Meter Hop Involved sec sec sec sec   PERCENT       Modified Pro-Shuttle Uninvolved sec sec sec sec   Modified Pro-Shuttle Uninvolved sec sec sec sec   Modified Pro-Shuttle Involved sec sec sec sec   Modified Pro-Shuttle Involved sec sec sec sec   PERCENT       Modified T Drill Uninvolved sec sec sec sec   Modified T Drill Uninvolved sec sec sec sec   Modified T Drill Involved sec sec sec sec   Modified T Drill Involved sec sec sec sec PERCENT       Lower Extremity Functional Test sec sec sec sec          Functional Testing Results (8/5/22)       SL Hop Uninvolved  170cm 161cm 161cm 164cm   SL Hop Uninvolved cm cm cm cm   SL Hop Involved 126cm 131cm 130cm 129cm   SL Hop Involved cm cm cm cm   PERCENT   78.7%      Triple Hop Uninvolved 458cm 466cm 464cm 462. 67cm   Triple Hop Uninvolved cm cm cm cm   Triple Hop Involved 377cm 377cm 371cm 375cm   Triple Hop Involved cm cm cm cm   PERCENT  81.1%      Triple Hop Crossover Uninvolved 429cm 408cm 398cm 411.67cm   Triple Hop Crossover Uninvolved cm cm cm cm   Triple Hop Crossover Involved 320cm 360cm 382cm 354cm   Triple Hop Crossover Involved cm cm cm cm   PERCENT  86%      6 Meter Hop Uninvolved 2.68sec 2.53sec 2.42sec 2.54sec   6 Meter Hop Uninvolved sec sec sec sec   6 Meter Hop Involved 2.67sec 2.56sec 2.56sec 2.60sec   6 Meter Hop Involved sec sec sec sec   PERCENT  97.6%        **Hops not within 10% comparing involved to uninvolved extremity. Continued focus on LE strength of the involved extremity. Exercises/Interventions:   Exercise/Equipment Resistance/Repetitions Other comments   Stretching/AROM       Bike x5' AROM  7/2 able to complete full revolutions    AlterG (100%) 10' (1' walk, 1'jog) 5/9        Calf stretch 30\"hx3 R ^6/15 slant board with protected weight bearing, bilateral axillary crutches.     Hamstring  30\"hx3 R 5/25 Seated EOT, 1/2 roll under ankle    Prone quad strertch 30\"hx3 R 7/30   ITB stretch 30\"hx3 R 12/8 reintroduced d/t symptoms and pt not being compliant with stretch for HEP   Strengthening       SLR abd with hip flex/ext 5# 3x10 R 12/8 side lying    SLR + 30\"hx5 R 11/11 5#   SL bridge        3x threat 3x10 R        3x10 12/8 with BS to maintain level hips   Clams Gray loop 10\"hx10 R ^12/8   Neuromuscular re-ed       SL squat at table 3x10 R 12/8 bilat UE support, decrease depth to 45 deg knee flexion   SL closed chain hamstring Green TB 3x10 R ^12/8   SLS with hip abd 3x10  12/8 withheld band to focus on proper form, completed on airex   Lunges  3x10 30#    BOSU Squats  3x10 gray               Quantum machines       Leg press  180# 5x5 SL R 10 holes show at bottom      220# 3x10 eccentric lower on R   ^ 12/13   Leg extension Warm up 20# 10x  40# 3x10 SL R (D,4, 1/2 hole)   50# 3x10 ECC      Leg curl 50# 5x5 SL R  See 1/2 hole on seat 11/30 adjusted weight based on form and fatigue    ^8/2   10/6 Completed with R LE only   Plyo's:      BLE Depth 2x10  S/S 2x10  FWD/REV 2x10            Agility:     Ladder 8'                        Other Therapeutic Activities: Ice 15'    Home Exercise Program: Update as needed. Patient Education:      (1/6): Discussed [employer] program and pt stated goals of getting back into working out and doing classes and being able to be active. Assessment:  [x] Patient tolerated treatment well [] Patient limited by fatigue  [] Patient limited by pain  [] Patient limited by other medical complications  [] Other:     Prognosis: [x] Good [] Fair  [] Poor    Patient Requires Follow-up: [x] Yes  [] No    Plan:   [x] Continue per plan of care [] Alter current plan (see comments)  [] Plan of care initiated [] Hold pending MD visit [] Discharge  Plan for Next Session:  Progress as tolerated. MD Follow-up: 4+ weeks     Electronically signed by:  Terrell Perea ATC     Tx was performed by T[facility] as a part of the [facility] program following PT's recommendations/guidance.        Cosigned by:

## 2022-12-13 ENCOUNTER — HOSPITAL ENCOUNTER (OUTPATIENT)
Dept: PHYSICAL THERAPY | Age: 32
Discharge: HOME | End: 2022-12-13

## 2023-01-26 ENCOUNTER — HOSPITAL ENCOUNTER (OUTPATIENT)
Dept: PHYSICAL THERAPY | Age: 33
Discharge: HOME | End: 2023-01-26

## (undated) DEVICE — SUTURE VCRL SZ 0 L27IN ABSRB UD L26MM CT-2 1/2 CIR J270H

## (undated) DEVICE — YANKAUER,OPEN TIP,W/O VENT,STERILE: Brand: MEDLINE INDUSTRIES, INC.

## (undated) DEVICE — TUBING PMP L6FT CONT WAVE EXTN

## (undated) DEVICE — MARKER,SKIN,WI/RULER AND LABELS: Brand: MEDLINE

## (undated) DEVICE — JEWISH HOSPITAL TURNOVER KIT: Brand: MEDLINE INDUSTRIES, INC.

## (undated) DEVICE — NEEDLE SUT KNEE LO PROF SCORPION

## (undated) DEVICE — SUTURE VCRL SZ 3-0 L27IN ABSRB UD L26MM CT-2 1/2 CIR J232H

## (undated) DEVICE — STERILE POLYISOPRENE POWDER-FREE SURGICAL GLOVES WITH EMOLLIENT COATING: Brand: PROTEXIS

## (undated) DEVICE — TUBING PMP L16FT MAIN DISP FOR AR-6400 AR-6475

## (undated) DEVICE — E-Z CLEAN, NON-STICK, PTFE COATED, ELECTROSURGICAL BLADE ELECTRODE, 2.5 INCH (6.35 CM): Brand: EZ CLEAN

## (undated) DEVICE — KNIFE SURG 10MM GRFT DISP FOR ACL RECON

## (undated) DEVICE — GUIDEPIN ORTH FLX FOR ACL RECON DISP VERSITOMIC

## (undated) DEVICE — BLADE SHV L13CM DIA4MM TAPR TIP SCIS LIKE CUT OVL OUTER

## (undated) DEVICE — ELECTROSURGICAL PENCIL ROCKER SWITCH NON COATED BLADE ELECTRODE 10 FT (3 M) CORD HOLSTER: Brand: MEGADYNE

## (undated) DEVICE — SUTURE FIBERWIRE SZ 2 W/ TAPERED NEEDLE BLUE L38IN NONABSORB BLU L26.5MM 1/2 CIRCLE AR7200

## (undated) DEVICE — SYRINGE IRRIG 60ML SFT PLIABLE BLB EZ TO GRP 1 HND USE W/

## (undated) DEVICE — COUNTER NDL 40 COUNT HLD 70 NUM FOAM BLK SGL MAG W BLDE REMV

## (undated) DEVICE — PACK PROCEDURE SURG ARTHROSCOPY

## (undated) DEVICE — CANNULA ARTHSCP L7CM DIA7MM TRNSLUC THRD FLX W/ NO SQUIRT

## (undated) DEVICE — ORTHO PRE OP PACK: Brand: MEDLINE INDUSTRIES, INC.

## (undated) DEVICE — SUTURE FIBERWIRE 2-0 L18IN NONABSORBABLE BLU L17.9MM 3/8 AR7220

## (undated) DEVICE — SOLUTION IRRIG 3000ML LAC R FLX CONT

## (undated) DEVICE — GLOVE ORANGE PI 7   MSG9070

## (undated) DEVICE — COLLECTOR TISS AUTOLGS

## (undated) DEVICE — SUTURE MCRYL SZ 4-0 L27IN ABSRB UD L19MM PS-2 1/2 CIR PRIM Y426H

## (undated) DEVICE — PAD DRY FLOOR ABS 32X58IN GRN

## (undated) DEVICE — SUTURE FIBERLOOP SZ 2-0 L24IN NONABSORBABLE BLU L26.2MM 3/8 AR723202

## (undated) DEVICE — 3M™ COBAN™ NL STERILE NON-LATEX SELF-ADHERENT WRAP, 2086S, 6 IN X 5 YD (15 CM X 4,5 M), 12 ROLLS/CASE: Brand: 3M™ COBAN™

## (undated) DEVICE — GOWN,SIRUS,POLYRNF,BRTHSLV,XL,30/CS: Brand: MEDLINE

## (undated) DEVICE — BLADE SHV L13CM DIA4MM EXCALIBUR AGG COOLCUT

## (undated) DEVICE — BUR SHV L13CM DIA4MM 8 FLUT OVL FOR RAP AGG BNE RESECT

## (undated) DEVICE — GLOVE SURG SZ 9 L1185IN FNGR THK75MIL STRW LTX POLYMER BEAD

## (undated) DEVICE — ADHESIVE SKIN CLSR 0.7ML TOP DERMBND ADV

## (undated) DEVICE — COVER LT HNDL BLU PLAS

## (undated) DEVICE — TUBING FLD MGMT Y DBL SPIK DUALWAVE

## (undated) DEVICE — INTENDED FOR TISSUE SEPARATION, AND OTHER PROCEDURES THAT REQUIRE A SHARP SURGICAL BLADE TO PUNCTURE OR CUT.: Brand: BARD-PARKER ® CARBON RIB-BACK BLADES

## (undated) DEVICE — SURE SET-DOUBLE BASIN-LF: Brand: MEDLINE INDUSTRIES, INC.

## (undated) DEVICE — 3M™ STERI-DRAPE™ U-DRAPE 1015: Brand: STERI-DRAPE™

## (undated) DEVICE — SOLUTION IV 1000ML 0.9% SOD CHL

## (undated) DEVICE — APPLICATOR MEDICATED 26 CC SOLUTION HI LT ORNG CHLORAPREP

## (undated) DEVICE — MARKER SURG SKIN UTIL BLK REG TIP NONSMEARING W/ 6IN RUL

## (undated) DEVICE — SPONGE,LAP,18"X18",DLX,XR,ST,5/PK,40/PK: Brand: MEDLINE

## (undated) DEVICE — GAUZE,SPONGE,4"X4",16PLY,XRAY,STRL,LF: Brand: MEDLINE

## (undated) DEVICE — PLATE ES AD W 9FT CRD 2

## (undated) DEVICE — MICRO SAGITTAL BLADE (9.4 X 0.4 X 26.2MM)

## (undated) DEVICE — PAD,NON-ADHERENT,3X8,STERILE,LF,1/PK: Brand: MEDLINE